# Patient Record
Sex: FEMALE | Race: WHITE | NOT HISPANIC OR LATINO | Employment: PART TIME | URBAN - METROPOLITAN AREA
[De-identification: names, ages, dates, MRNs, and addresses within clinical notes are randomized per-mention and may not be internally consistent; named-entity substitution may affect disease eponyms.]

---

## 2016-02-05 LAB — PAP-ABSTRACT: NORMAL

## 2017-01-19 ENCOUNTER — OFFICE VISIT (OUTPATIENT)
Dept: FAMILY MEDICINE | Facility: CLINIC | Age: 37
End: 2017-01-19
Payer: COMMERCIAL

## 2017-01-19 ENCOUNTER — EXTERNAL ORDER RESULTS (OUTPATIENT)
Dept: HEALTH INFORMATION MANAGEMENT | Facility: CLINIC | Age: 37
End: 2017-01-19

## 2017-01-19 VITALS
HEART RATE: 113 BPM | RESPIRATION RATE: 20 BRPM | SYSTOLIC BLOOD PRESSURE: 112 MMHG | TEMPERATURE: 98 F | BODY MASS INDEX: 26.67 KG/M2 | WEIGHT: 176 LBS | DIASTOLIC BLOOD PRESSURE: 79 MMHG | HEIGHT: 68 IN

## 2017-01-19 DIAGNOSIS — R09.82 PND (POST-NASAL DRIP): ICD-10-CM

## 2017-01-19 DIAGNOSIS — B34.9 VIRAL SYNDROME: Primary | ICD-10-CM

## 2017-01-19 PROCEDURE — 99213 OFFICE O/P EST LOW 20 MIN: CPT | Performed by: FAMILY MEDICINE

## 2017-01-19 RX ORDER — CLONAZEPAM 1 MG/1
0.5 TABLET ORAL
COMMUNITY
Start: 2017-01-11 | End: 2022-04-25

## 2017-01-19 RX ORDER — HYDROCODONE BITARTRATE AND ACETAMINOPHEN 5; 325 MG/1; MG/1
1 TABLET ORAL
COMMUNITY
Start: 2017-01-11 | End: 2021-04-30

## 2017-01-19 RX ORDER — FLUTICASONE PROPIONATE 50 MCG
2 SPRAY, SUSPENSION (ML) NASAL DAILY
Qty: 1 BOTTLE | Refills: 0 | Status: SHIPPED | OUTPATIENT
Start: 2017-01-19 | End: 2017-06-02

## 2017-01-19 NOTE — MR AVS SNAPSHOT
"              After Visit Summary   1/19/2017    Yvonne Diez    MRN: [id number]           Patient Information     Date Of Birth          1980        Visit Information        Provider Department      1/19/2017 1:45 PM Talia Silverio MD [facility]        Today's Diagnoses     Viral syndrome    -  1     PND (post-nasal drip)           Care Instructions      Follow up if symptoms persist or worsen   Viral Syndrome (Adult)  A viral illness may cause a number of symptoms. The symptoms depend on the part of the body that the virus affects. If it settles in the nose, throat, and lungs, it may cause cough, sore throat, congestion, and sometimes headache. If it settles in the stomach and intestinal tract, it may cause vomiting and diarrhea. Sometimes it causes vague symptoms like \"aching all over,\" feeling tired, loss of appetite, or fever.  A viral illness usually lasts 1 to 2 weeks, but sometimes it lasts longer. In some cases, a more serious infection can look like a viral syndrome in the first few days of the illness. You may need another exam and additional tests to know the difference. Watch for the warning signs listed below.  Home care  Follow these guidelines for taking care of yourself at home:    If symptoms are severe, rest at home for the first 2 to 3 days.    Stay away from cigarette smoke - both your smoke and the smoke from others.    You may use over-the-counter medication acetaminophen or ibuprofen for fever, muscle aching, and headache, unless another medicine was prescribed for this. If you have chronic liver or kidney disease or ever had a stomach ulcer or GI bleeding, talk with your doctor before using these medicines . No one who is younger than 18 and ill with a fever should take aspirin. It may cause severe disease or death liver damage .    Your appetite may be poor, so a light diet is fine. Avoid dehydration by drinking 8 to 12 8-ounce glasses of fluids " each day. This may include water; orange juice; lemonade; apple, grape, and cranberry juice; clear fruit drinks; electrolyte replacement and sports drinks; and decaffeinated teas and coffee. If you have been diagnosed with a kidney disease, ask your doctor how much and what types of fluids you should drink to prevent dehydration. If you have kidney disease, drinking too much fluid can cause it build up in the your body and be dangerous to your health.    Over-the-counter remedies won't shorten the length of the illness but may be helpful for cough, sore throat; and nasal and sinus congestion. Don't use decongestants if you have high blood pressure.  Follow-up care  Follow up with your health care provider if you do not improve over the next week.  When to seek medical advice  Call your healthcare provider right away if any of these occur:    Cough with lots of colored sputum (mucus) or blood in your sputum    Chest pain, shortness of breath, wheezing, or difficulty breathing    Severe headache; face, neck, or ear pain    Severe, constant pain in the lower right side of your belly (abdominal)    Continued vomiting (can t keep liquids down)    Frequent diarrhea (more than 5 times a day); blood (red or black color) or mucus in diarrhea    Feeling weak, dizzy, or like you are going to faint    Extreme thirst    Fever of 100.4 F (38 C) or higher, or as directed by your healthcare provider  Call 911  Get emergency medical care if any of the following occur:    Convulsion    Feeling weak, dizzy, or like you are going to faint    Chest pain, shortness of breath, wheezing, or difficulty breathing    7364-4695 [employer]. [address] [place] [address], [place] [address]. All rights reserved. This information is not intended as a substitute for professional medical care. Always follow your healthcare professional's instructions.              Follow-ups after your visit        Who to contact     If you have questions or  "need follow up information about today's clinic visit or your schedule please contact [facility] directly at [phone].  Normal or non-critical lab and imaging results will be communicated to you by [facility], letter or phone within 4 business days after the clinic has received the results. If you do not hear from us within 7 days, please contact the clinic through [employer]t or phone. If you have a critical or abnormal lab result, we will notify you by phone as soon as possible.  Submit refill requests through [employer] or call your pharmacy and they will forward the refill request to us. Please allow 3 business days for your refill to be completed.          Additional Information About Your Visit        [facility] Information     [employer] gives you secure access to your electronic health record. If you see a primary care provider, you can also send messages to your care team and make appointments. If you have questions, please call your primary care clinic.  If you do not have a primary care provider, please call [phone] and they will assist you.        Care EveryWhere ID     This is your Care EveryWhere ID. This could be used by other organizations to access your [place] medical records  [id number]        Your Vitals Were     Pulse Temperature Respirations Height BMI (Body Mass Index) Breastfeeding?    113 98  F (36.7  C) (Oral) 20 5' 8\" (1.727 m) 26.77 kg/m2 No       Blood Pressure from Last 3 Encounters:   01/19/17 112/79   07/04/16 110/70   04/05/16 108/60    Weight from Last 3 Encounters:   01/19/17 176 lb (79.833 kg)   07/04/16 174 lb (78.926 kg)   04/05/16 187 lb (84.823 kg)              Today, you had the following     No orders found for display         Today's Medication Changes          These changes are accurate as of: 1/19/17  2:21 PM.  If you have any questions, ask your nurse or doctor.               Start taking these medicines.        Dose/Directions    fluticasone 50 MCG/ACT " spray   Commonly known as:  FLONASE   Used for:  PND (post-nasal drip)   Started by:  Talia Silverio MD        Dose:  2 spray   Spray 2 sprays into both nostrils daily   Quantity:  1 Bottle   Refills:  0            Where to get your medicines      These medications were sent to [id number] - [place], [place] - [address] [facility]  [address], [place] [address]     Phone:  [phone]    - fluticasone 50 MCG/ACT spray             Primary Care Provider Office Phone # Fax #    Herbert Silva [phone]       [facility] [address] [facility] [address]  [place] [facility] [address]        Thank you!     Thank you for choosing [facility]  for your care. Our goal is always to provide you with excellent care. Hearing back from our patients is one way we can continue to improve our services. Please take a few minutes to complete the written survey that you may receive in the mail after your visit with us. Thank you!             Your Updated Medication List - Protect others around you: Learn how to safely use, store and throw away your medicines at www.disposemymeds.org.          This list is accurate as of: 1/19/17  2:21 PM.  Always use your most recent med list.                   Brand Name Dispense Instructions for use    ADVIL 200 MG capsule   Generic drug:  ibuprofen      Take 200 mg by mouth every 4 hours as needed. 2 tablets as needed for pain       BUSPAR 15 MG tablet   Generic drug:  busPIRone      Take 15 mg by mouth daily.       CALCIUM 1000 + D PO      Take  by mouth daily.       celeXA 40 MG tablet   Generic drug:  citalopram      Take 40 mg by mouth daily       clonazePAM 1 MG tablet    klonoPIN     prn       fluticasone 50 MCG/ACT spray    FLONASE    1 Bottle    Spray 2 sprays into both nostrils daily       HYDROcodone-acetaminophen 5-325 MG per tablet    NORCO     Take 1 tablet by mouth 1 tab DAILY prn       MULTI  MAX PO      Take  by mouth daily.       norethindrone-ethinyl estradiol-iron 1.5-30 MG-MCG per tablet    MICROGESTIN FE1.5/30    3 Package    Take 1 tablet by mouth daily.       WAL-ZACKERY MAXIMUM STRENGTH 50 MG Caps   Generic drug:  DiphenhydrAMINE HCl (Sleep)      Take 1 capsule by mouth At Bedtime.

## 2017-01-19 NOTE — NURSING NOTE
"Chief Complaint   Patient presents with     URI     uri symptoms x1 week, c/o bilateral ear pain, fever and chills     Initial /79 mmHg  Pulse 113  Temp(Src) 98  F (36.7  C) (Oral)  Resp 20  Ht 5' 8\" (1.727 m)  Wt 176 lb (79.833 kg)  BMI 26.77 kg/m2  Breastfeeding? No Estimated body mass index is 26.77 kg/(m^2) as calculated from the following:    Height as of this encounter: 5' 8\" (1.727 m).    Weight as of this encounter: 176 lb (79.833 kg)..  BP completed using cuff size regular.            Fina Hickman/ASTRID    "

## 2017-01-19 NOTE — Clinical Note
Please abstract the following data from this visit with this patient into the appropriate field in Epic:  Pap smear done on this date: 01/01/2016 (approximately), by this group: [facility], results were wnl.

## 2017-01-19 NOTE — PROGRESS NOTES
"  SUBJECTIVE:                                                    Yvonne Diez is a 36 year old female who presents to clinic today for the following health issues:      Acute Illness   Acute illness concerns: sinus pain/pressure  Onset: x1 week     Fever: YES- yesterday 100     Chills/Sweats: YES    Headache (location?): YES    Sinus Pressure:YES    Conjunctivitis:  no    Ear Pain: YES: bilateral    Rhinorrhea: YES    Congestion: YES    Sore Throat: YES    Teeth pain: no      Cough: no    Wheeze: no    Shortness of breath: no     Decreased Appetite: YES    Nausea: no    Vomiting: no    Diarrhea:  YES    Dysuria/Freq.: no    Fatigue/Achiness: YES    Sick/Strep Exposure: no     Therapies Tried and outcome: sudafed, nyquil/dayquil,     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   Denies rash. Her son is also sick with bronchitis and is currently on steroids.   Also reports insomnia- of note this is not a new problem but states it has worsened since she's been sick.     Problem list and histories reviewed & adjusted, as indicated.  Additional history: as documented    Problem list, Medication list, Allergies, and Medical/Social/Surgical histories reviewed in [facility] and updated as appropriate.    ROS:  Constitutional, HEENT, cardiovascular, pulmonary, msk, neuro systems are negative, except as otherwise noted.    OBJECTIVE:                                                    /79 mmHg  Pulse 113  Temp(Src) 98  F (36.7  C) (Oral)  Resp 20  Ht 5' 8\" (1.727 m)  Wt 176 lb (79.833 kg)  BMI 26.77 kg/m2  Breastfeeding? No  Body mass index is 26.77 kg/(m^2).  GENERAL: alert, ill-appearing, fatigued  HENT: ear canals and TM's normal, nose and mouth without ulcers or lesions, nasal congestion   NECK: no adenopathy, no asymmetry, masses, or scars and thyroid normal to palpation  RESP: lungs clear to auscultation - no rales, rhonchi or " wheezes  CV: regular rate and rhythm, normal S1 S2, no S3 or S4, no murmur, click or rub, no peripheral edema and peripheral pulses strong    Diagnostic Test Results:  none      ASSESSMENT/PLAN:                                                        1. Viral syndrome  - sxs consistent with viral syndrome. Considering her symptoms have been over a week there will be little benefit of tamiflu at this time.   Patient agrees with this. There is no benefit of testing for parvovirus at this as treatment is mainly symptomatic care.     2. PND (post-nasal drip)  - by history will try on flonase   - fluticasone (FLONASE) 50 MCG/ACT spray; Spray 2 sprays into both nostrils daily  Dispense: 1 Bottle; Refill: 0    See Patient Instructions    Talia Silverio MD  [facility]

## 2017-01-19 NOTE — PATIENT INSTRUCTIONS
"  Follow up if symptoms persist or worsen   Viral Syndrome (Adult)  A viral illness may cause a number of symptoms. The symptoms depend on the part of the body that the virus affects. If it settles in the nose, throat, and lungs, it may cause cough, sore throat, congestion, and sometimes headache. If it settles in the stomach and intestinal tract, it may cause vomiting and diarrhea. Sometimes it causes vague symptoms like \"aching all over,\" feeling tired, loss of appetite, or fever.  A viral illness usually lasts 1 to 2 weeks, but sometimes it lasts longer. In some cases, a more serious infection can look like a viral syndrome in the first few days of the illness. You may need another exam and additional tests to know the difference. Watch for the warning signs listed below.  Home care  Follow these guidelines for taking care of yourself at home:    If symptoms are severe, rest at home for the first 2 to 3 days.    Stay away from cigarette smoke - both your smoke and the smoke from others.    You may use over-the-counter medication acetaminophen or ibuprofen for fever, muscle aching, and headache, unless another medicine was prescribed for this. If you have chronic liver or kidney disease or ever had a stomach ulcer or GI bleeding, talk with your doctor before using these medicines . No one who is younger than 18 and ill with a fever should take aspirin. It may cause severe disease or death liver damage .    Your appetite may be poor, so a light diet is fine. Avoid dehydration by drinking 8 to 12 8-ounce glasses of fluids each day. This may include water; orange juice; lemonade; apple, grape, and cranberry juice; clear fruit drinks; electrolyte replacement and sports drinks; and decaffeinated teas and coffee. If you have been diagnosed with a kidney disease, ask your doctor how much and what types of fluids you should drink to prevent dehydration. If you have kidney disease, drinking too much fluid can cause it " build up in the your body and be dangerous to your health.    Over-the-counter remedies won't shorten the length of the illness but may be helpful for cough, sore throat; and nasal and sinus congestion. Don't use decongestants if you have high blood pressure.  Follow-up care  Follow up with your health care provider if you do not improve over the next week.  When to seek medical advice  Call your healthcare provider right away if any of these occur:    Cough with lots of colored sputum (mucus) or blood in your sputum    Chest pain, shortness of breath, wheezing, or difficulty breathing    Severe headache; face, neck, or ear pain    Severe, constant pain in the lower right side of your belly (abdominal)    Continued vomiting (can t keep liquids down)    Frequent diarrhea (more than 5 times a day); blood (red or black color) or mucus in diarrhea    Feeling weak, dizzy, or like you are going to faint    Extreme thirst    Fever of 100.4 F (38 C) or higher, or as directed by your healthcare provider  Call 911  Get emergency medical care if any of the following occur:    Convulsion    Feeling weak, dizzy, or like you are going to faint    Chest pain, shortness of breath, wheezing, or difficulty breathing    6963-5565 [employer]. [address] [place] [address], [place] [address]. All rights reserved. This information is not intended as a substitute for professional medical care. Always follow your healthcare professional's instructions.

## 2017-03-03 ENCOUNTER — TELEPHONE (OUTPATIENT)
Dept: FAMILY MEDICINE | Facility: CLINIC | Age: 37
End: 2017-03-03

## 2017-03-03 NOTE — TELEPHONE ENCOUNTER
Panel Management Review      Patient has the following on her problem list:   Depression / Dysthymia review  No flowsheet data found.   Patient is due for:  None      Composite cancer screening  Chart review shows that this patient is due/due soon for the following Pap Smear  Summary:    Patient is due/failing the following:   PAP    Action needed:   Patient needs office visit for PE/PAP.    Type of outreach:    none, need to update chart, had normal pap 02/05/2016 at [facility],    Questions for provider review:    None                                                                                                                                    Fina Hickman/ASTRID  [place]---[facility]       Chart routed to none .

## 2017-04-22 ENCOUNTER — APPOINTMENT (OUTPATIENT)
Dept: CT IMAGING | Facility: CLINIC | Age: 37
End: 2017-04-22
Attending: INTERNAL MEDICINE
Payer: COMMERCIAL

## 2017-04-22 ENCOUNTER — APPOINTMENT (OUTPATIENT)
Dept: GENERAL RADIOLOGY | Facility: CLINIC | Age: 37
End: 2017-04-22
Attending: INTERNAL MEDICINE
Payer: COMMERCIAL

## 2017-04-22 ENCOUNTER — HOSPITAL ENCOUNTER (EMERGENCY)
Facility: CLINIC | Age: 37
Discharge: HOME | End: 2017-04-22
Attending: INTERNAL MEDICINE | Admitting: INTERNAL MEDICINE
Payer: COMMERCIAL

## 2017-04-22 VITALS
DIASTOLIC BLOOD PRESSURE: 73 MMHG | SYSTOLIC BLOOD PRESSURE: 102 MMHG | RESPIRATION RATE: 18 BRPM | TEMPERATURE: 98.4 F | OXYGEN SATURATION: 100 % | HEART RATE: 75 BPM

## 2017-04-22 DIAGNOSIS — R51.9 ACUTE NONINTRACTABLE HEADACHE, UNSPECIFIED HEADACHE TYPE: ICD-10-CM

## 2017-04-22 LAB
ALBUMIN SERPL-MCNC: 3.4 G/DL (ref 3.4–5)
ALBUMIN UR-MCNC: NEGATIVE MG/DL
ALP SERPL-CCNC: 46 U/L (ref 40–150)
ALT SERPL W P-5'-P-CCNC: 34 U/L (ref 0–50)
ANION GAP SERPL CALCULATED.3IONS-SCNC: 7 MMOL/L (ref 3–14)
APPEARANCE CSF: CLEAR
APPEARANCE UR: CLEAR
AST SERPL W P-5'-P-CCNC: 22 U/L (ref 0–45)
BASOPHILS # BLD AUTO: 0 10E9/L (ref 0–0.2)
BASOPHILS NFR BLD AUTO: 0.1 %
BILIRUB SERPL-MCNC: 0.3 MG/DL (ref 0.2–1.3)
BILIRUB UR QL STRIP: NEGATIVE
BUN SERPL-MCNC: 9 MG/DL (ref 7–30)
CALCIUM SERPL-MCNC: 8 MG/DL (ref 8.5–10.1)
CHLORIDE SERPL-SCNC: 105 MMOL/L (ref 94–109)
CO2 SERPL-SCNC: 25 MMOL/L (ref 20–32)
COLOR CSF: COLORLESS
COLOR UR AUTO: YELLOW
CREAT SERPL-MCNC: 0.69 MG/DL (ref 0.52–1.04)
DIFFERENTIAL METHOD BLD: ABNORMAL
EOSINOPHIL # BLD AUTO: 0 10E9/L (ref 0–0.7)
EOSINOPHIL NFR BLD AUTO: 0.1 %
ERYTHROCYTE [DISTWIDTH] IN BLOOD BY AUTOMATED COUNT: 12.2 % (ref 10–15)
FLUAV+FLUBV AG SPEC QL: NEGATIVE
FLUAV+FLUBV AG SPEC QL: NORMAL
GFR SERPL CREATININE-BSD FRML MDRD: ABNORMAL ML/MIN/1.7M2
GLUCOSE CSF-MCNC: 68 MG/DL (ref 40–70)
GLUCOSE SERPL-MCNC: 97 MG/DL (ref 70–99)
GLUCOSE UR STRIP-MCNC: NEGATIVE MG/DL
GRAM STN SPEC: NORMAL
HCT VFR BLD AUTO: 36.6 % (ref 35–47)
HGB BLD-MCNC: 12.6 G/DL (ref 11.7–15.7)
HGB UR QL STRIP: NEGATIVE
IMM GRANULOCYTES # BLD: 0 10E9/L (ref 0–0.4)
IMM GRANULOCYTES NFR BLD: 0.1 %
KETONES UR STRIP-MCNC: NEGATIVE MG/DL
LEUKOCYTE ESTERASE UR QL STRIP: NEGATIVE
LYMPHOCYTES # BLD AUTO: 0.7 10E9/L (ref 0.8–5.3)
LYMPHOCYTES NFR BLD AUTO: 9.3 %
MCH RBC QN AUTO: 32.2 PG (ref 26.5–33)
MCHC RBC AUTO-ENTMCNC: 34.4 G/DL (ref 31.5–36.5)
MCV RBC AUTO: 94 FL (ref 78–100)
MICRO REPORT STATUS: NORMAL
MONOCYTES # BLD AUTO: 0.5 10E9/L (ref 0–1.3)
MONOCYTES NFR BLD AUTO: 6.4 %
NEUTROPHILS # BLD AUTO: 5.9 10E9/L (ref 1.6–8.3)
NEUTROPHILS NFR BLD AUTO: 84 %
NITRATE UR QL: NEGATIVE
NRBC # BLD AUTO: 0 10*3/UL
NRBC BLD AUTO-RTO: 0 /100
PH UR STRIP: 6 PH (ref 5–7)
PLATELET # BLD AUTO: 298 10E9/L (ref 150–450)
POTASSIUM SERPL-SCNC: 3.2 MMOL/L (ref 3.4–5.3)
PROT CSF-MCNC: 28 MG/DL (ref 15–60)
PROT SERPL-MCNC: 7.1 G/DL (ref 6.8–8.8)
RBC # BLD AUTO: 3.91 10E12/L (ref 3.8–5.2)
RBC # CSF MANUAL: 0 /UL (ref 0–2)
RBC # CSF MANUAL: NORMAL /UL (ref 0–2)
RBC #/AREA URNS AUTO: <1 /HPF (ref 0–2)
SODIUM SERPL-SCNC: 137 MMOL/L (ref 133–144)
SP GR UR STRIP: 1.02 (ref 1–1.03)
SPECIMEN SOURCE: NORMAL
SPECIMEN SOURCE: NORMAL
SQUAMOUS #/AREA URNS AUTO: <1 /HPF (ref 0–1)
TUBE # CSF: 4 #
URN SPEC COLLECT METH UR: NORMAL
UROBILINOGEN UR STRIP-MCNC: 0 MG/DL (ref 0–2)
WBC # BLD AUTO: 7 10E9/L (ref 4–11)
WBC # CSF MANUAL: 2 /UL (ref 0–5)
WBC # CSF MANUAL: NORMAL /UL (ref 0–5)
WBC #/AREA URNS AUTO: 1 /HPF (ref 0–2)

## 2017-04-22 PROCEDURE — 70450 CT HEAD/BRAIN W/O DYE: CPT

## 2017-04-22 PROCEDURE — 89050 BODY FLUID CELL COUNT: CPT | Performed by: INTERNAL MEDICINE

## 2017-04-22 PROCEDURE — 96374 THER/PROPH/DIAG INJ IV PUSH: CPT

## 2017-04-22 PROCEDURE — 82945 GLUCOSE OTHER FLUID: CPT | Performed by: INTERNAL MEDICINE

## 2017-04-22 PROCEDURE — 71020 XR CHEST 2 VW: CPT

## 2017-04-22 PROCEDURE — 96375 TX/PRO/DX INJ NEW DRUG ADDON: CPT | Mod: 59

## 2017-04-22 PROCEDURE — 87070 CULTURE OTHR SPECIMN AEROBIC: CPT | Performed by: INTERNAL MEDICINE

## 2017-04-22 PROCEDURE — 99285 EMERGENCY DEPT VISIT HI MDM: CPT | Mod: 25

## 2017-04-22 PROCEDURE — 62270 DX LMBR SPI PNXR: CPT

## 2017-04-22 PROCEDURE — 25500064 ZZH RX 255 OP 636: Performed by: INTERNAL MEDICINE

## 2017-04-22 PROCEDURE — 25000128 H RX IP 250 OP 636: Performed by: INTERNAL MEDICINE

## 2017-04-22 PROCEDURE — 96376 TX/PRO/DX INJ SAME DRUG ADON: CPT | Mod: 59

## 2017-04-22 PROCEDURE — 81001 URINALYSIS AUTO W/SCOPE: CPT | Performed by: INTERNAL MEDICINE

## 2017-04-22 PROCEDURE — 87205 SMEAR GRAM STAIN: CPT | Performed by: INTERNAL MEDICINE

## 2017-04-22 PROCEDURE — 85025 COMPLETE CBC W/AUTO DIFF WBC: CPT | Performed by: INTERNAL MEDICINE

## 2017-04-22 PROCEDURE — 70498 CT ANGIOGRAPHY NECK: CPT

## 2017-04-22 PROCEDURE — 96361 HYDRATE IV INFUSION ADD-ON: CPT

## 2017-04-22 PROCEDURE — 87040 BLOOD CULTURE FOR BACTERIA: CPT | Performed by: INTERNAL MEDICINE

## 2017-04-22 PROCEDURE — 87804 INFLUENZA ASSAY W/OPTIC: CPT | Mod: 91 | Performed by: INTERNAL MEDICINE

## 2017-04-22 PROCEDURE — 80053 COMPREHEN METABOLIC PANEL: CPT | Performed by: INTERNAL MEDICINE

## 2017-04-22 PROCEDURE — 84157 ASSAY OF PROTEIN OTHER: CPT | Performed by: INTERNAL MEDICINE

## 2017-04-22 RX ORDER — METOCLOPRAMIDE 10 MG/1
10 TABLET ORAL 4 TIMES DAILY PRN
Qty: 20 TABLET | Refills: 0 | Status: SHIPPED | OUTPATIENT
Start: 2017-04-22 | End: 2017-06-02

## 2017-04-22 RX ORDER — IOPAMIDOL 755 MG/ML
500 INJECTION, SOLUTION INTRAVASCULAR ONCE
Status: COMPLETED | OUTPATIENT
Start: 2017-04-22 | End: 2017-04-22

## 2017-04-22 RX ORDER — METOCLOPRAMIDE HYDROCHLORIDE 5 MG/ML
10 INJECTION INTRAMUSCULAR; INTRAVENOUS ONCE
Status: COMPLETED | OUTPATIENT
Start: 2017-04-22 | End: 2017-04-22

## 2017-04-22 RX ORDER — ONDANSETRON 4 MG/1
4 TABLET, ORALLY DISINTEGRATING ORAL EVERY 8 HOURS PRN
Qty: 10 TABLET | Refills: 0 | Status: SHIPPED | OUTPATIENT
Start: 2017-04-22 | End: 2017-04-25

## 2017-04-22 RX ORDER — HYDROMORPHONE HYDROCHLORIDE 1 MG/ML
0.5 INJECTION, SOLUTION INTRAMUSCULAR; INTRAVENOUS; SUBCUTANEOUS
Status: DISCONTINUED | OUTPATIENT
Start: 2017-04-22 | End: 2017-04-22 | Stop reason: HOSPADM

## 2017-04-22 RX ORDER — OXYCODONE AND ACETAMINOPHEN 5; 325 MG/1; MG/1
1-2 TABLET ORAL EVERY 4 HOURS PRN
Qty: 15 TABLET | Refills: 0 | Status: SHIPPED | OUTPATIENT
Start: 2017-04-22 | End: 2017-04-24

## 2017-04-22 RX ORDER — MORPHINE SULFATE 4 MG/ML
4 INJECTION, SOLUTION INTRAMUSCULAR; INTRAVENOUS ONCE
Status: COMPLETED | OUTPATIENT
Start: 2017-04-22 | End: 2017-04-22

## 2017-04-22 RX ORDER — DIPHENHYDRAMINE HYDROCHLORIDE 50 MG/ML
25 INJECTION INTRAMUSCULAR; INTRAVENOUS ONCE
Status: COMPLETED | OUTPATIENT
Start: 2017-04-22 | End: 2017-04-22

## 2017-04-22 RX ORDER — SODIUM CHLORIDE 9 MG/ML
1000 INJECTION, SOLUTION INTRAVENOUS CONTINUOUS
Status: DISCONTINUED | OUTPATIENT
Start: 2017-04-22 | End: 2017-04-22 | Stop reason: HOSPADM

## 2017-04-22 RX ORDER — ONDANSETRON 2 MG/ML
4 INJECTION INTRAMUSCULAR; INTRAVENOUS EVERY 30 MIN PRN
Status: DISCONTINUED | OUTPATIENT
Start: 2017-04-22 | End: 2017-04-22 | Stop reason: HOSPADM

## 2017-04-22 RX ADMIN — HYDROMORPHONE HYDROCHLORIDE 0.5 MG: 1 INJECTION, SOLUTION INTRAMUSCULAR; INTRAVENOUS; SUBCUTANEOUS at 17:15

## 2017-04-22 RX ADMIN — DIPHENHYDRAMINE HYDROCHLORIDE 25 MG: 50 INJECTION, SOLUTION INTRAMUSCULAR; INTRAVENOUS at 19:08

## 2017-04-22 RX ADMIN — IOPAMIDOL 70 ML: 755 INJECTION, SOLUTION INTRAVENOUS at 17:50

## 2017-04-22 RX ADMIN — MORPHINE SULFATE 4 MG: 4 INJECTION, SOLUTION INTRAMUSCULAR; INTRAVENOUS at 20:00

## 2017-04-22 RX ADMIN — METOCLOPRAMIDE 10 MG: 5 INJECTION, SOLUTION INTRAMUSCULAR; INTRAVENOUS at 19:09

## 2017-04-22 RX ADMIN — SODIUM CHLORIDE 1000 ML: 9 INJECTION, SOLUTION INTRAVENOUS at 18:20

## 2017-04-22 RX ADMIN — ONDANSETRON 4 MG: 2 INJECTION INTRAMUSCULAR; INTRAVENOUS at 17:15

## 2017-04-22 RX ADMIN — SODIUM CHLORIDE 80 ML: 9 INJECTION, SOLUTION INTRAVENOUS at 17:50

## 2017-04-22 RX ADMIN — HYDROMORPHONE HYDROCHLORIDE 0.5 MG: 1 INJECTION, SOLUTION INTRAMUSCULAR; INTRAVENOUS; SUBCUTANEOUS at 18:18

## 2017-04-22 RX ADMIN — MORPHINE SULFATE 4 MG: 4 INJECTION, SOLUTION INTRAMUSCULAR; INTRAVENOUS at 19:09

## 2017-04-22 RX ADMIN — ONDANSETRON 4 MG: 2 INJECTION INTRAMUSCULAR; INTRAVENOUS at 18:20

## 2017-04-22 RX ADMIN — SODIUM CHLORIDE 1000 ML: 9 INJECTION, SOLUTION INTRAVENOUS at 17:15

## 2017-04-22 ASSESSMENT — ENCOUNTER SYMPTOMS
DIARRHEA: 1
FEVER: 1
COUGH: 0
SHORTNESS OF BREATH: 0
HEADACHES: 1
BACK PAIN: 1
NECK PAIN: 1
PALPITATIONS: 1
VOMITING: 1

## 2017-04-22 NOTE — ED AVS SNAPSHOT
[facility] Emergency Department    [address] [place] [address]    Phone:  [phone]    Fax:  [phone]                                       Yvonne Diez   MRN: [id number]    Department:  [facility] Emergency Department   Date of Visit:  4/22/2017           Patient Information     Date Of Birth          1980        Your diagnoses for this visit were:     Acute nonintractable headache, unspecified headache type        You were seen by Bonita Moore MD.      Follow-up Information     Follow up with Herbert Silva MD In 2 days.    Specialty:  Family Practice    Contact information:    [facility]  [address]  [place] [address]  [phone]          Discharge Instructions       Discharge Instructions  Headache    You were seen today for a headache. Headaches may be caused by many different things such as muscle tension, sinus inflammation, anxiety and stress, having too little sleep, too much alcohol, some medical conditions or injury. You may have a migraine, which is caused by changes in the blood vessels in your head.  At this time your doctor does not find that your headache is a sign of anything dangerous or life-threatening.  However, sometimes the signs of serious illness do not show up right away.  If you have new or worse symptoms, you may need to be seen again in the emergency department or by your primary doctor.      Return to the Emergency Department if:    You get a fever of 101 F or higher.    Your headache gets much worse.    You get a stiff neck with your headache.    You get a new headache that is different or worse than headaches you have had before.    You are vomiting and can t keep food or water down.    You have blurry or double vision or other problems with your eyes.    You have a new weakness on one side of your body.    You have difficulty with balance which is new.    You or your family thinks you are  confused.    You have a seizure or convulsion.    What can I do to help myself?    Pain medications - You may take a pain medication such as Tylenol  (acetaminophen), Advil , Nuprin  (ibuprofen) or Aleve  (naproxen).  If you have been given a narcotic such as Vicodin  (hydrocodone with acetaminophen), Percocet  (oxycodone with acetaminophen), codeine, or a muscle relaxant such as Flexeril  (cyclobenzaprine) or Soma  (carisoprodol), do not drive for four hours after you have taken it. If the narcotic contains Tylenol  (acetaminophen), do not take Tylenol  with it. All narcotics will cause constipation, so eat a high fiber diet.        Take a pain reliever as soon as you notice symptoms.  Starting medications as soon as you start to have symptoms may lessen the amount of pain you have.    Relaxing in a quiet, dark room may help.    Get enough sleep and eat meals regularly.    Schedule an appointment with your primary physician as instructed, or at least within 1 week.    You may need to watch for certain foods or other things which may trigger your headaches.  Keeping a journal of your headaches and possible triggers may help you and your primary doctor to identify things which you should avoid which may be causing your headaches.  If you were given a prescription for medicine here today, be sure to read all of the information (including the package insert) that comes with your prescription.  This will include important information about the medicine, its side effects, and any warnings that you need to know about.  The pharmacist who fills the prescription can provide more information and answer questions you may have about the medicine.  If you have questions or concerns that the pharmacist cannot address, please call or return to the Emergency Department.   Opioid Medication Information    Pain medications are among the most commonly prescribed medicines, so we are including this information for all our patients. If  you did not receive pain medication or get a prescription for pain medicine, you can ignore it.     You may have been given a prescription for an opioid (narcotic) pain medicine and/or have received a pain medicine while here in the Emergency Department. These medicines can make you drowsy or impaired. You must not drive, operate dangerous equipment, or engage in any other dangerous activities while taking these medications. If you drive while taking these medications, you could be arrested for DUI, or driving under the influence. Do not drink any alcohol while you are taking these medications.     Opioid pain medications can cause addiction. If you have a history of chemical dependency of any type, you are at a higher risk of becoming addicted to pain medications.  Only take these prescribed medications to treat your pain when all other options have been tried. Take it for as short a time and as few doses as possible. Store your pain pills in a secure place, as they are frequently stolen and provide a dangerous opportunity for children or visitors in your house to start abusing these powerful medications. We will not replace any lost or stolen medicine.  As soon as your pain is better, you should flush all your remaining medication.     Many prescription pain medications contain Tylenol  (acetaminophen), including Vicodin , Tylenol #3 , Norco , Lortab , and Percocet .  You should not take any extra pills of Tylenol  if you are using these prescription medications or you can get very sick.  Do not ever take more than 3000 mg of acetaminophen in any 24 hour period.    All opioids tend to cause constipation. Drink plenty of water and eat foods that have a lot of fiber, such as fruits, vegetables, prune juice, apple juice and high fiber cereal.  Take a laxative if you don t move your bowels at least every other day. Miralax , Milk of Magnesia, Colace , or Senna  can be used to keep you regular.      Remember that you  can always come back to the Emergency Department if you are not able to see your regular doctor in the amount of time listed above, if you get any new symptoms, or if there is anything that worries you.        24 Hour Appointment Hotline       To make an appointment at any [facility] clinic, call [phone] ([phone]). If you don't have a family doctor or clinic, we will help you find one. [facility] clinics are conveniently located to serve the needs of you and your family.             Review of your medicines      START taking        Dose / Directions Last dose taken    metoclopramide 10 MG tablet   Commonly known as:  REGLAN   Dose:  10 mg   Quantity:  20 tablet        Take 1 tablet (10 mg) by mouth 4 times daily as needed   Refills:  0        ondansetron 4 MG ODT tab   Commonly known as:  ZOFRAN ODT   Dose:  4 mg   Quantity:  10 tablet        Take 1 tablet (4 mg) by mouth every 8 hours as needed   Refills:  0        oxyCODONE-acetaminophen 5-325 MG per tablet   Commonly known as:  PERCOCET   Dose:  1-2 tablet   Quantity:  15 tablet        Take 1-2 tablets by mouth every 4 hours as needed for pain   Refills:  0          Our records show that you are taking the medicines listed below. If these are incorrect, please call your family doctor or clinic.        Dose / Directions Last dose taken    ADVIL 200 MG capsule   Dose:  200 mg   Generic drug:  ibuprofen        Take 200 mg by mouth every 4 hours as needed. 2 tablets as needed for pain   Refills:  0        BUSPAR 15 MG tablet   Dose:  15 mg   Generic drug:  busPIRone        Take 15 mg by mouth daily.   Refills:  0        CALCIUM 1000 + D PO        Take  by mouth daily.   Refills:  0        celeXA 40 MG tablet   Dose:  40 mg   Generic drug:  citalopram        Take 40 mg by mouth daily   Refills:  0        clonazePAM 1 MG tablet   Commonly known as:  klonoPIN        prn   Refills:  0        fluticasone 50 MCG/ACT spray   Commonly known as:  FLONASE   Dose:  2  spray   Quantity:  1 Bottle        Spray 2 sprays into both nostrils daily   Refills:  0        HYDROcodone-acetaminophen 5-325 MG per tablet   Commonly known as:  NORCO   Dose:  1 tablet        Take 1 tablet by mouth 1 tab DAILY prn   Refills:  0        MULTI MAX PO        Take  by mouth daily.   Refills:  0        norethindrone-ethinyl estradiol-iron 1.5-30 MG-MCG per tablet   Commonly known as:  MICROGESTIN FE1.5/30   Dose:  1 tablet   Quantity:  3 Package        Take 1 tablet by mouth daily.   Refills:  3        WAL-ZACKERY MAXIMUM STRENGTH 50 MG Caps   Dose:  1 capsule   Generic drug:  DiphenhydrAMINE HCl (Sleep)        Take 1 capsule by mouth At Bedtime.   Refills:  0                Prescriptions were sent or printed at these locations (3 Prescriptions)                   Other Prescriptions                Printed at Department/Unit printer (3 of 3)         metoclopramide (REGLAN) 10 MG tablet               ondansetron (ZOFRAN ODT) 4 MG ODT tab               oxyCODONE-acetaminophen (PERCOCET) 5-325 MG per tablet                Procedures and tests performed during your visit     Blood culture ONE site    CBC with platelets differential    CSF Culture Aerobic Bacterial    CT Head Neck Angio w/o & w Contrast    CT Head w/o Contrast    Cell count with differential CSF: Tube 1    Cell count with differential CSF: Tube 4    Chest XR,  PA & LAT    Comprehensive metabolic panel    Glucose CSF: Tube 2    Gram stain    Influenza A/B antigen    Protein total CSF: Tube 2    UA with Microscopic reflex to Culture      Orders Needing Specimen Collection     None      Pending Results     Date and Time Order Name Status Description    4/22/2017 1821 Gram stain Preliminary     4/22/2017 1821 CSF Culture Aerobic Bacterial In process     4/22/2017 1650 Blood culture ONE site In process             Pending Culture Results     Date and Time Order Name Status Description    4/22/2017 1821 Gram stain Preliminary     4/22/2017 1821 CSF  Culture Aerobic Bacterial In process     4/22/2017 1650 Blood culture ONE site In process             Test Results From Your Hospital Stay        4/22/2017  5:36 PM      Component Results     Component Value Ref Range & Units Status    WBC 7.0 4.0 - 11.0 10e9/L Final    RBC Count 3.91 3.8 - 5.2 10e12/L Final    Hemoglobin 12.6 11.7 - 15.7 g/dL Final    Hematocrit 36.6 35.0 - 47.0 % Final    MCV 94 78 - 100 fl Final    MCH 32.2 26.5 - 33.0 pg Final    MCHC 34.4 31.5 - 36.5 g/dL Final    RDW 12.2 10.0 - 15.0 % Final    Platelet Count 298 150 - 450 10e9/L Final    Diff Method Automated Method  Final    % Neutrophils 84.0 % Final    % Lymphocytes 9.3 % Final    % Monocytes 6.4 % Final    % Eosinophils 0.1 % Final    % Basophils 0.1 % Final    % Immature Granulocytes 0.1 % Final    Nucleated RBCs 0 0 /100 Final    Absolute Neutrophil 5.9 1.6 - 8.3 10e9/L Final    Absolute Lymphocytes 0.7 (L) 0.8 - 5.3 10e9/L Final    Absolute Monocytes 0.5 0.0 - 1.3 10e9/L Final    Absolute Eosinophils 0.0 0.0 - 0.7 10e9/L Final    Absolute Basophils 0.0 0.0 - 0.2 10e9/L Final    Abs Immature Granulocytes 0.0 0 - 0.4 10e9/L Final    Absolute Nucleated RBC 0.0  Final         4/22/2017  7:13 PM      Narrative     CT ANGIOGRAM OF THE HEAD AND NECK WITHOUT AND WITH CONTRAST  4/22/2017  6:00 PM     COMPARISON: None.    HISTORY: Headache    TECHNIQUE:  Precontrast localizing scans were followed by CT  angiography with an injection of 70mL Isovue-370 nonionic intravenous  contrast material with scans through the head and neck.  Images were  transferred to a separate 3-D workstation where multiplanar  reformations and 3-D images were created.  Estimates of carotid  stenoses are made relative to the distal internal carotid artery  diameters except as noted.      FINDINGS:   Neck CTA: The bilateral common carotid and bilateral cervical internal  carotid arteries are patent without stenosis. There is normal variant  duplicate origin of the right  vertebral artery. Vertebral arteries  bilaterally are widely patent without stenosis.    Head CTA: The bilateral distal internal carotid, basilar, bilateral  anterior cerebral, bilateral middle cerebral and bilateral posterior  cerebral arteries are patent and unremarkable. The anterior  communicating and bilateral posterior communicating arteries are  patent and unremarkable.        Impression     IMPRESSION: Normal neck and head CTA.      Radiation dose for this scan was reduced using automated exposure  control, adjustment of the mA and/or kV according to patient size, or  iterative reconstruction technique    MACK BEST MD         4/22/2017  7:12 PM      Narrative     CT OF THE HEAD WITHOUT CONTRAST 4/22/2017 5:58 PM     COMPARISON: None    HISTORY:  Headache    TECHNIQUE: Axial CT images of the head from the skull base to the  vertex were acquired without IV contrast.    FINDINGS: The ventricles and basal cisterns are within normal limits  in configuration. There is no midline shift. There are no extra-axial  fluid collections.  Gray-white differentiation is well maintained.    No intracranial hemorrhage, mass or recent infarct.    The visualized paranasal sinuses are well-aerated. There is no  mastoiditis. There are no fractures of the visualized bones.        Impression     IMPRESSION:  Normal head CT.      Radiation dose for this scan was reduced using automated exposure  control, adjustment of the mA and/or kV according to patient size, or  iterative reconstruction technique    MACK BEST MD         4/22/2017  5:52 PM      Component Results     Component Value Ref Range & Units Status    Sodium 137 133 - 144 mmol/L Final    Potassium 3.2 (L) 3.4 - 5.3 mmol/L Final    Chloride 105 94 - 109 mmol/L Final    Carbon Dioxide 25 20 - 32 mmol/L Final    Anion Gap 7 3 - 14 mmol/L Final    Glucose 97 70 - 99 mg/dL Final    Urea Nitrogen 9 7 - 30 mg/dL Final    Creatinine 0.69 0.52 - 1.04 mg/dL Final    GFR  Estimate >90  Non  GFR Calc   >60 mL/min/1.7m2 Final    GFR Estimate If Black >90   GFR Calc   >60 mL/min/1.7m2 Final    Calcium 8.0 (L) 8.5 - 10.1 mg/dL Final    Bilirubin Total 0.3 0.2 - 1.3 mg/dL Final    Albumin 3.4 3.4 - 5.0 g/dL Final    Protein Total 7.1 6.8 - 8.8 g/dL Final    Alkaline Phosphatase 46 40 - 150 U/L Final    ALT 34 0 - 50 U/L Final    AST 22 0 - 45 U/L Final         4/22/2017  5:55 PM      Component Results     Component Value Ref Range & Units Status    Influenza A/B Agn Specimen Nasopharyngeal  Final    Influenza A Negative NEG Final    Influenza B  NEG Final    Negative   Test results must be correlated with clinical data. If necessary, results   should be confirmed by a molecular assay or viral culture.           4/22/2017  6:32 PM      Component Results     Component Value Ref Range & Units Status    Color Urine Yellow  Final    Appearance Urine Clear  Final    Glucose Urine Negative NEG mg/dL Final    Bilirubin Urine Negative NEG Final    Ketones Urine Negative NEG mg/dL Final    Specific Gravity Urine 1.023 1.003 - 1.035 Final    Blood Urine Negative NEG Final    pH Urine 6.0 5.0 - 7.0 pH Final    Protein Albumin Urine Negative NEG mg/dL Final    Urobilinogen mg/dL 0.0 0.0 - 2.0 mg/dL Final    Nitrite Urine Negative NEG Final    Leukocyte Esterase Urine Negative NEG Final    Source Midstream Urine  Final    WBC Urine 1 0 - 2 /HPF Final    RBC Urine <1 0 - 2 /HPF Final    Squamous Epithelial /HPF Urine <1 0 - 1 /HPF Final         4/22/2017  6:08 PM      Narrative     CHEST TWO VIEWS 4/22/2017 6:04 PM     HISTORY: Fever.    COMPARISON: None.    FINDINGS: There are no acute infiltrates. The cardiac silhouette is  not enlarged. Pulmonary vasculature is unremarkable.        Impression     IMPRESSION: No acute disease.    NIC JAIME MD         4/22/2017  5:31 PM         4/22/2017  7:46 PM      Component Results     Component Value Ref Range & Units Status     WBC CSF 2 0 - 5 /uL Final    RBC CSF 0 0 - 2 /uL Final    Tube Number 4 # Final    Color CSF Colorless CLRL Final    Appearance CSF Clear CLER Final         4/22/2017  7:30 PM      Component Results     Component Value Ref Range & Units Status    Glucose CSF 68 40 - 70 mg/dL Final    CSF glucose concentrations are about 60 percent of normal plasma glucose.         4/22/2017  7:30 PM      Component Results     Component Value Ref Range & Units Status    Protein Total CSF 28 15 - 60 mg/dL Final         4/22/2017  7:05 PM         4/22/2017  8:01 PM      Component Results     Component    Specimen Description    Cerebrospinal fluid    Gram Stain    No organisms seen  Rare WBC'S seen  Gram stain result is preliminary and awaits review of Microbiology Staff.      Micro Report Status    Pending         4/22/2017  7:46 PM      Component Results     Component Value Ref Range & Units Status    WBC CSF  0 - 5 /uL Final    Test not performed. Criteria not met for second cell count.    RBC CSF  0 - 2 /uL Final    Test not performed. Criteria not met for second cell count.                Clinical Quality Measure: Blood Pressure Screening     Your blood pressure was checked while you were in the emergency department today. The last reading we obtained was  BP: 102/73 . Please read the guidelines below about what these numbers mean and what you should do about them.  If your systolic blood pressure (the top number) is less than 120 and your diastolic blood pressure (the bottom number) is less than 80, then your blood pressure is normal. There is nothing more that you need to do about it.  If your systolic blood pressure (the top number) is 120-139 or your diastolic blood pressure (the bottom number) is 80-89, your blood pressure may be higher than it should be. You should have your blood pressure rechecked within a year by a primary care provider.  If your systolic blood pressure (the top number) is 140 or greater or your diastolic  blood pressure (the bottom number) is 90 or greater, you may have high blood pressure. High blood pressure is treatable, but if left untreated over time it can put you at risk for heart attack, stroke, or kidney failure. You should have your blood pressure rechecked by a primary care provider within the next 4 weeks.  If your provider in the emergency department today gave you specific instructions to follow-up with your doctor or provider even sooner than that, you should follow that instruction and not wait for up to 4 weeks for your follow-up visit.        Thank you for choosing [facility]       Thank you for choosing [facility] for your care. Our goal is always to provide you with excellent care. Hearing back from our patients is one way we can continue to improve our services. Please take a few minutes to complete the written survey that you may receive in the mail after you visit with us. Thank you!        [employer] Information     [employer] gives you secure access to your electronic health record. If you see a primary care provider, you can also send messages to your care team and make appointments. If you have questions, please call your primary care clinic.  If you do not have a primary care provider, please call [phone] and they will assist you.        Care EveryWhere ID     This is your Care EveryWhere ID. This could be used by other organizations to access your [facility] medical records  [id number]        After Visit Summary       This is your record. Keep this with you and show to your community pharmacist(s) and doctor(s) at your next visit.

## 2017-04-22 NOTE — ED NOTES
Patient complains of headache and neck pain. Nausea and vomiting with diarrhea noted. Patient states that when she moves her head down pain shoots into her neck. Pain currently 9/10.

## 2017-04-22 NOTE — ED AVS SNAPSHOT
[facility] Emergency Department    [address]    [facility] [address]    Phone:  [phone]    Fax:  [phone]                                       Yvonne Diez   MRN: [id number]    Department:  [facility] Emergency Department   Date of Visit:  4/22/2017           After Visit Summary Signature Page     I have received my discharge instructions, and my questions have been answered. I have discussed any challenges I see with this plan with the nurse or doctor.    ..........................................................................................................................................  Patient/Patient Representative Signature      ..........................................................................................................................................  Patient Representative Print Name and Relationship to Patient    ..................................................               ................................................  Date                                            Time    ..........................................................................................................................................  Reviewed by Signature/Title    ...................................................              ..............................................  Date                                                            Time

## 2017-04-22 NOTE — ED PROVIDER NOTES
History     Chief Complaint:  Headache    HPI   Yvonne Diez is a 36 year old female with a history of endometriosis who presents to the emergency department today for evaluation of headache. Ms. Diez reports 21:00 last night she began to experience a sudden on set of a severe headache with intractable vomiting and diarrhea. The patient notes symptoms have been constant since with temporary improvement on Zofran.  She reports today at 04:00 she began to experience intermittent palpitations. The patient sates when she moves her head from side to side causes shooting pain radiating from her head down her spine. Of note, patient had similar episode years ago. Due to worsening of symptoms, fever of 101, and worry about possible meningitis, patient came in for evaluation. She denies cough, chest pain, or shortness of breath.     Fever of 101  No sick contacts.     Allergies:  Sulfa drugs    Medications:    Flonase  Klonopin  Celexa  Diphenhydramine  Buspar    Past Medical History:    Endometriosis  Insomnia  Depression  Anxiety    Past Surgical History:    Laparoscopy of abdomen  [place] teeth extraction    Family History:    Prostate cancer  Lung cancer  Lupus  Lout Geerigs disease    Social History:  Marital Status:   [2]  Tobacco: Former Smoker  Alcohol: Positive  Presents alone.   PCP: Herbert Silva  Works as an ICU nurse.     Review of Systems   Constitutional: Positive for fever.   Respiratory: Negative for cough and shortness of breath.    Cardiovascular: Positive for palpitations. Negative for chest pain.   Gastrointestinal: Positive for diarrhea and vomiting.   Musculoskeletal: Positive for back pain and neck pain.   Neurological: Positive for headaches.   All other systems reviewed and are negative.    Physical Exam   First Vitals:  BP: 128/81  Pulse: 111  Temp: 98.4  F (36.9  C)  Resp: 18  SpO2: 100 %    Physical Exam   Constitutional: She is cooperative. She appears distressed.   HENT:    Right Ear: Tympanic membrane normal.   Left Ear: Tympanic membrane normal.   Mouth/Throat: Oropharynx is clear and moist and mucous membranes are normal.   Eyes: Conjunctivae are normal.   Neck:   Some pain with rotation of neck, unable to flex due to pain   Cardiovascular: Regular rhythm and normal heart sounds.    Pulmonary/Chest: Effort normal and breath sounds normal.   Abdominal: Soft. Normal appearance and bowel sounds are normal. There is no rebound and no guarding.   Musculoskeletal: Normal range of motion.   Lymphadenopathy:     She has no cervical adenopathy.   Neurological: She is alert.   Skin: Skin is warm and dry.   Psychiatric: She has a normal mood and affect.       Emergency Department Course   Imaging:  Radiographic findings were communicated with the patient who voiced understanding of the findings.    Chest X-Ray. 2 Views:   No acute disease.   Final reading per radiology.     CT Head/Neck Angio without and with contrast  Normal neck and head CTA.   Preliminary  reading per radiology    CT Head with contrast:  Normal head CT.   Preliminary reading per radiology.     Laboratory:  Influenza A/B: Negative  CMP: Potassium 3.2, Calcium 8.0 o/w WNL. (Creatinine )  CBC:  WBC 7.0, HGB 12.6,   Blood culture pending.   Cell count with differential CSF: WBC 2 RBC 0 Tube Number 4,  coloreless , clear   Protein total CSF: 28  Glucose CSF: 68  Gram stain: Negative  CSF Culture: pending    UA: WNL. WBC/HPF 1, RBC/HPF <1.     Procedures:     Lumbar Puncture         INDICATION:  headache and fever      CONSENT:  Risks (including but not limited to; infection, bleeding, spinal headache with possibility of spinal patch and temporary or permanent neurologic injury), benefits and alternatives were discussed with patient and consent for procedure was obtained.    TIMEOUT:  Universal protocol was followed. TIME OUT conducted just prior to starting procedure confirmed patient identity, site/side, procedure,  patient position, and availability of correct equipment and implants? Yes      MEDICATIONS:  Conscious Sedation    PROCEDURAL NOTE:  Patient was placed in a left lateral decubitus position.  The low back was prepped with Betadine.  The patient was medicated as above.  A spinal needle was used to gain access to the subarachnoid space with stylet in place. The fluid was clear.  Stylet was replaced and needle withdrawn.    PATIENT STATUS:  Patient tolerated the procedure very well.  There were no complications.      Interventions:  17:15 Normal saline 1,000mL IV   18:18 Dilaudid 0.5 mg IV  18:20 Zofran 4mg IV     Emergency Department Course:  Nursing notes and vitals reviewed.    16:42  I performed an exam of the patient as documented above.     17:08 Blood drawn. This was sent to the lab for further testing, results above.    18:11 Urine collected. This was sent to the lab for further testing, results above.    17:17 Recheck patient.     The patient received the intervention(s) above.    18:36 I performed a lumbar and puncture as noted above.     18:52  CSF collected. This was sent to the lab for further testing, results above.    19:52 Recheck patient. Findings and plan explained to the Patient. Patient discharged home with instructions regarding supportive care, medications, and reasons to return. The importance of close follow-up was reviewed.  Impression & Plan    Medical Decision Making:  Yvonne Diez is a 36 year old female presents to the ED with sudden onset of an unusually severe headache along with fever in the night. 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 With a history of sudden onset and neck stiffness, I was also concerned about subarachnoid hemorrhage. Imaging was unremarkable, but given the concern, we did proceed to LP. This does not show any significant red blood cells, white blood cells, or xanthochromia. With this,  we have effectively ruled out life threatening etiologies of her headache. Other lab tests are unremarkable. Influenza test is negative, no leukocytosis. I suspect a nonspecific virus infection. I think we can treat symptomatically with close followup. I have discharged the patient with Reglan, Zofran, and Percocet as needed. Return if worsening or new symptoms. Follow 2 days in clinic.     Diagnosis:    ICD-10-CM    1. Acute nonintractable headache, unspecified headache type R51 Blood culture ONE site     CSF Culture Aerobic Bacterial     Gram stain       Disposition:  discharged to home    Discharge Medications:  Discharge Medication List as of 4/22/2017  9:02 PM      START taking these medications    Details   metoclopramide (REGLAN) 10 MG tablet Take 1 tablet (10 mg) by mouth 4 times daily as needed, Disp-20 tablet, R-0, Local Print      ondansetron (ZOFRAN ODT) 4 MG ODT tab Take 1 tablet (4 mg) by mouth every 8 hours as needed, Disp-10 tablet, R-0, Local Print      oxyCODONE-acetaminophen (PERCOCET) 5-325 MG per tablet Take 1-2 tablets by mouth every 4 hours as needed for pain, Disp-15 tablet, R-0, Local Print             Scribe Disclosure:  I, Raquel Torre, am serving as a scribe at 4:42 PM on 4/22/2017 to document services personally performed by Bonita Moore MD, based on my observations and the provider's statements to me.  Raquel Torre  4/22/2017   [facility] EMERGENCY DEPARTMENT       Bonita Moore MD  04/22/17 [id number]

## 2017-04-23 NOTE — DISCHARGE INSTRUCTIONS
Discharge Instructions  Headache    You were seen today for a headache. Headaches may be caused by many different things such as muscle tension, sinus inflammation, anxiety and stress, having too little sleep, too much alcohol, some medical conditions or injury. You may have a migraine, which is caused by changes in the blood vessels in your head.  At this time your doctor does not find that your headache is a sign of anything dangerous or life-threatening.  However, sometimes the signs of serious illness do not show up right away.  If you have new or worse symptoms, you may need to be seen again in the emergency department or by your primary doctor.      Return to the Emergency Department if:    You get a fever of 101 F or higher.    Your headache gets much worse.    You get a stiff neck with your headache.    You get a new headache that is different or worse than headaches you have had before.    You are vomiting and can t keep food or water down.    You have blurry or double vision or other problems with your eyes.    You have a new weakness on one side of your body.    You have difficulty with balance which is new.    You or your family thinks you are confused.    You have a seizure or convulsion.    What can I do to help myself?    Pain medications - You may take a pain medication such as Tylenol  (acetaminophen), Advil , Nuprin  (ibuprofen) or Aleve  (naproxen).  If you have been given a narcotic such as Vicodin  (hydrocodone with acetaminophen), Percocet  (oxycodone with acetaminophen), codeine, or a muscle relaxant such as Flexeril  (cyclobenzaprine) or Soma  (carisoprodol), do not drive for four hours after you have taken it. If the narcotic contains Tylenol  (acetaminophen), do not take Tylenol  with it. All narcotics will cause constipation, so eat a high fiber diet.        Take a pain reliever as soon as you notice symptoms.  Starting medications as soon as you start to have symptoms may lessen the  amount of pain you have.    Relaxing in a quiet, dark room may help.    Get enough sleep and eat meals regularly.    Schedule an appointment with your primary physician as instructed, or at least within 1 week.    You may need to watch for certain foods or other things which may trigger your headaches.  Keeping a journal of your headaches and possible triggers may help you and your primary doctor to identify things which you should avoid which may be causing your headaches.  If you were given a prescription for medicine here today, be sure to read all of the information (including the package insert) that comes with your prescription.  This will include important information about the medicine, its side effects, and any warnings that you need to know about.  The pharmacist who fills the prescription can provide more information and answer questions you may have about the medicine.  If you have questions or concerns that the pharmacist cannot address, please call or return to the Emergency Department.   Opioid Medication Information    Pain medications are among the most commonly prescribed medicines, so we are including this information for all our patients. If you did not receive pain medication or get a prescription for pain medicine, you can ignore it.     You may have been given a prescription for an opioid (narcotic) pain medicine and/or have received a pain medicine while here in the Emergency Department. These medicines can make you drowsy or impaired. You must not drive, operate dangerous equipment, or engage in any other dangerous activities while taking these medications. If you drive while taking these medications, you could be arrested for DUI, or driving under the influence. Do not drink any alcohol while you are taking these medications.     Opioid pain medications can cause addiction. If you have a history of chemical dependency of any type, you are at a higher risk of becoming addicted to pain  medications.  Only take these prescribed medications to treat your pain when all other options have been tried. Take it for as short a time and as few doses as possible. Store your pain pills in a secure place, as they are frequently stolen and provide a dangerous opportunity for children or visitors in your house to start abusing these powerful medications. We will not replace any lost or stolen medicine.  As soon as your pain is better, you should flush all your remaining medication.     Many prescription pain medications contain Tylenol  (acetaminophen), including Vicodin , Tylenol #3 , Norco , Lortab , and Percocet .  You should not take any extra pills of Tylenol  if you are using these prescription medications or you can get very sick.  Do not ever take more than 3000 mg of acetaminophen in any 24 hour period.    All opioids tend to cause constipation. Drink plenty of water and eat foods that have a lot of fiber, such as fruits, vegetables, prune juice, apple juice and high fiber cereal.  Take a laxative if you don t move your bowels at least every other day. Miralax , Milk of Magnesia, Colace , or Senna  can be used to keep you regular.      Remember that you can always come back to the Emergency Department if you are not able to see your regular doctor in the amount of time listed above, if you get any new symptoms, or if there is anything that worries you.

## 2017-04-24 ENCOUNTER — HOSPITAL ENCOUNTER (EMERGENCY)
Facility: CLINIC | Age: 37
Discharge: HOME | End: 2017-04-24
Attending: EMERGENCY MEDICINE | Admitting: EMERGENCY MEDICINE
Payer: COMMERCIAL

## 2017-04-24 ENCOUNTER — ANESTHESIA (OUTPATIENT)
Dept: SURGERY | Facility: CLINIC | Age: 37
End: 2017-04-24
Payer: COMMERCIAL

## 2017-04-24 ENCOUNTER — ANESTHESIA EVENT (OUTPATIENT)
Dept: SURGERY | Facility: CLINIC | Age: 37
End: 2017-04-24
Payer: COMMERCIAL

## 2017-04-24 VITALS
HEART RATE: 93 BPM | RESPIRATION RATE: 18 BRPM | OXYGEN SATURATION: 100 % | TEMPERATURE: 97 F | SYSTOLIC BLOOD PRESSURE: 123 MMHG | DIASTOLIC BLOOD PRESSURE: 81 MMHG

## 2017-04-24 DIAGNOSIS — G97.1 POST LUMBAR PUNCTURE HEADACHE: ICD-10-CM

## 2017-04-24 LAB
ANION GAP SERPL CALCULATED.3IONS-SCNC: 7 MMOL/L (ref 3–14)
BUN SERPL-MCNC: 7 MG/DL (ref 7–30)
CALCIUM SERPL-MCNC: 8.6 MG/DL (ref 8.5–10.1)
CHLORIDE SERPL-SCNC: 105 MMOL/L (ref 94–109)
CO2 SERPL-SCNC: 26 MMOL/L (ref 20–32)
CREAT SERPL-MCNC: 0.72 MG/DL (ref 0.52–1.04)
GFR SERPL CREATININE-BSD FRML MDRD: ABNORMAL ML/MIN/1.7M2
GLUCOSE SERPL-MCNC: 178 MG/DL (ref 70–99)
MAGNESIUM SERPL-MCNC: 1.9 MG/DL (ref 1.6–2.3)
POTASSIUM SERPL-SCNC: 3 MMOL/L (ref 3.4–5.3)
SODIUM SERPL-SCNC: 138 MMOL/L (ref 133–144)

## 2017-04-24 PROCEDURE — 96376 TX/PRO/DX INJ SAME DRUG ADON: CPT | Mod: 59

## 2017-04-24 PROCEDURE — 80048 BASIC METABOLIC PNL TOTAL CA: CPT | Performed by: EMERGENCY MEDICINE

## 2017-04-24 PROCEDURE — 25000132 ZZH RX MED GY IP 250 OP 250 PS 637: Performed by: EMERGENCY MEDICINE

## 2017-04-24 PROCEDURE — 62273 INJECT EPIDURAL PATCH: CPT

## 2017-04-24 PROCEDURE — 40000671 ZZH STATISTIC ANESTHESIA CASE

## 2017-04-24 PROCEDURE — 96375 TX/PRO/DX INJ NEW DRUG ADDON: CPT

## 2017-04-24 PROCEDURE — 25000128 H RX IP 250 OP 636: Performed by: EMERGENCY MEDICINE

## 2017-04-24 PROCEDURE — 96361 HYDRATE IV INFUSION ADD-ON: CPT

## 2017-04-24 PROCEDURE — 96374 THER/PROPH/DIAG INJ IV PUSH: CPT | Mod: 59

## 2017-04-24 PROCEDURE — 83735 ASSAY OF MAGNESIUM: CPT | Performed by: EMERGENCY MEDICINE

## 2017-04-24 PROCEDURE — 99285 EMERGENCY DEPT VISIT HI MDM: CPT | Mod: 25

## 2017-04-24 RX ORDER — OXYCODONE AND ACETAMINOPHEN 5; 325 MG/1; MG/1
1-2 TABLET ORAL EVERY 4 HOURS PRN
Qty: 15 TABLET | Refills: 0 | Status: SHIPPED | OUTPATIENT
Start: 2017-04-24 | End: 2017-06-02

## 2017-04-24 RX ORDER — HYDROMORPHONE HYDROCHLORIDE 1 MG/ML
0.5 INJECTION, SOLUTION INTRAMUSCULAR; INTRAVENOUS; SUBCUTANEOUS
Status: DISCONTINUED | OUTPATIENT
Start: 2017-04-24 | End: 2017-04-24

## 2017-04-24 RX ORDER — MORPHINE SULFATE 4 MG/ML
4 INJECTION, SOLUTION INTRAMUSCULAR; INTRAVENOUS
Status: DISCONTINUED | OUTPATIENT
Start: 2017-04-24 | End: 2017-04-24 | Stop reason: HOSPADM

## 2017-04-24 RX ORDER — ONDANSETRON 2 MG/ML
4 INJECTION INTRAMUSCULAR; INTRAVENOUS ONCE
Status: COMPLETED | OUTPATIENT
Start: 2017-04-24 | End: 2017-04-24

## 2017-04-24 RX ORDER — POTASSIUM CHLORIDE 1500 MG/1
40 TABLET, EXTENDED RELEASE ORAL ONCE
Status: COMPLETED | OUTPATIENT
Start: 2017-04-24 | End: 2017-04-24

## 2017-04-24 RX ORDER — SODIUM CHLORIDE 9 MG/ML
1000 INJECTION, SOLUTION INTRAVENOUS CONTINUOUS
Status: DISCONTINUED | OUTPATIENT
Start: 2017-04-24 | End: 2017-04-24 | Stop reason: HOSPADM

## 2017-04-24 RX ORDER — CAFFEINE 200 MG
200 TABLET ORAL ONCE
Status: COMPLETED | OUTPATIENT
Start: 2017-04-24 | End: 2017-04-24

## 2017-04-24 RX ORDER — METOCLOPRAMIDE HYDROCHLORIDE 5 MG/ML
5 INJECTION INTRAMUSCULAR; INTRAVENOUS ONCE
Status: COMPLETED | OUTPATIENT
Start: 2017-04-24 | End: 2017-04-24

## 2017-04-24 RX ADMIN — SODIUM CHLORIDE 1000 ML: 9 INJECTION, SOLUTION INTRAVENOUS at 18:57

## 2017-04-24 RX ADMIN — CAFFEINE 200 MG: 200 TABLET ORAL at 19:21

## 2017-04-24 RX ADMIN — POTASSIUM CHLORIDE 40 MEQ: 1500 TABLET, EXTENDED RELEASE ORAL at 21:08

## 2017-04-24 RX ADMIN — METOCLOPRAMIDE 5 MG: 5 INJECTION, SOLUTION INTRAMUSCULAR; INTRAVENOUS at 20:59

## 2017-04-24 RX ADMIN — MORPHINE SULFATE 4 MG: 4 INJECTION, SOLUTION INTRAMUSCULAR; INTRAVENOUS at 21:04

## 2017-04-24 RX ADMIN — MORPHINE SULFATE 4 MG: 4 INJECTION, SOLUTION INTRAMUSCULAR; INTRAVENOUS at 19:17

## 2017-04-24 RX ADMIN — ONDANSETRON 4 MG: 2 INJECTION INTRAMUSCULAR; INTRAVENOUS at 18:57

## 2017-04-24 RX ADMIN — SODIUM CHLORIDE 1000 ML: 9 INJECTION, SOLUTION INTRAVENOUS at 20:06

## 2017-04-24 ASSESSMENT — ENCOUNTER SYMPTOMS
WEAKNESS: 0
FEVER: 0
NAUSEA: 1
NECK PAIN: 1
ABDOMINAL PAIN: 0
HEADACHES: 1
NUMBNESS: 0
BACK PAIN: 1

## 2017-04-24 NOTE — ED PROVIDER NOTES
History     Chief Complaint:  Headache     HPI   Yvonne Diez is a 36 year old female who presents with a headache. The patient was in the ED 2 days ago for a headache, nausea, vomiting, and diarrhea, had an extensive workup including head CT, head and neck CTA, and LP, which were all normal and was discharged home with Reglan, Zofran, and percocet. She states that her headache, back pain, neck pain, and nausea persisted since that time and have been improved and manageable with the medication she was discharged with. After returning home, the patient noticed that when she stood up her headache would worsen and was alleviated by lying down. This afternoon, when the patient was standing for a longer period of time and exerting herself more than previously, her headache became more severe, prompting a visit to the ED. She states that she has not been eating normally, but has been drinking fluids. The patient no longer has a fever. No vision changes. No numbness or tingling. No weakness in her hands or feet. No abdominal pain. No other new symptoms. The patient drinks 1 cup of coffee and 2-3 sodas daily, has not had this much caffeine in the past few days.     Allergies:  Sulfa drugs     Medications:    Reglan  Zofran  Percocet  Flonase  Celexa  Microgestin FE  Diphenhydramine   Multivitamin  Calcium carb-cholecalciferol  Ibuprofen     Past Medical History:    Endometriosis  Insomnia  Anxiety  Depression    Past Surgical History:    Laparoscopy, surgical, abdomen, peritoneum & omentum;  [place] teeth    Family History:    Endocrine disease - mother    Social History:  Marital Status:   Presents to the ED alone  Tobacco Use: former smoker  Alcohol Use: positive  PCP: Herbert Silva     Review of Systems   Constitutional: Negative for fever.   Eyes: Negative for visual disturbance.   Gastrointestinal: Positive for nausea. Negative for abdominal pain.   Musculoskeletal: Positive for back pain and neck pain.    Neurological: Positive for headaches. Negative for weakness and numbness.   All other systems reviewed and are negative.    Physical Exam   First Vitals:  BP: (!) 139/97  Pulse: 93  Temp: 97  F (36.1  C)  Resp: 16  SpO2: 100 %    Physical Exam  VITAL SIGNS: BP (!) 139/97  Pulse 93  Temp 97  F (36.1  C)  Resp 16  LMP 04/22/2017  SpO2 100%  Breastfeeding? No   Constitutional:  Well developed, Well nourished, comfortable appearing   HENT:  Bilateral external ears normal, Mucous membranes moist, Nose normal. Neck- Normal range of motion, Supple  Eyes: PERRL, EOMI, conjunctivae unremarkable  Respiratory:  Normal breath sounds, No respiratory distress, No wheezing,  Cardiovascular:  Normal heart rate, Normal rhythm, No murmurs,    Musculoskeletal:  Intact distal pulses, No edema, grossly unremarkable range of motion   Integument:  Warm, Dry. 2 small puncture wounds visible over the lumbar spine without erythema, hematoma, or any other finding.  Neurological: Alert, attentive and oriented to person, place and time  Cranial nerves 2-12 intact;   5/5 strength throughout the upper and lower extremities;   Sensation intact to light touch throughout the upper and lower extremities;   Psychiatric:  Mood and affect normal.     Emergency Department Course   Laboratory:  BMP: potassium 3.0 (L), glucose 178 (H),, otherwise WNL (Creatinine 0.72)  Magnesium: 1.9    Interventions:  (1857) Normal Saline, 1 liter, IV bolus  (2110) Normal Saline, 1 liter, IV bolus  (2104) Morphine, 4 mg, IV injection  (1917) Morphine, 4 mg, IV injection  (1921) Caffeine, 200 mg, PO  (1857) Zofran, 4 mg, IV injection  (2108) Potassium chloride, 40 mEq, PO  (2059) Reglan, 5 mg, IV    Emergency Department Course:  Nursing notes and vitals reviewed.  I performed an exam of the patient as documented above.   A peripheral IV was established. Blood was drawn from the patient. This was sent for laboratory testing, findings above.   (1929) I consulted with  Dr. Roberts of anesthesiology regarding the patient. The agreed to perform a blood patch procedure for the patient.   (1931) I rechecked the patient and updated her on the plan.  Anesthesiology performed a blood patch in the ED.  (2042) I rechecked the patient. The patient has a residual headache, but is overall feeling better.  Findings and plan explained to the patient. Patient discharged home with instructions regarding supportive care, medications, and reasons to return. The importance of close follow-up was reviewed. The patient was prescribed Percocet.  I personally reviewed the laboratory results with the patient and answered all related questions prior to discharge.     Impression & Plan    Medical Decision Making:  Yvonne Diez is a 36 year old female who presents for evaluation of headache, with an extensive previous workup which did not show any significant cause, includingan LP at that time.  The patient currently feels fairly well except for an intense and persistent positional headache.  The patient's headache is resolved with laying flat and becomes unbearable with sitting/standing.  This headache appears classically consistent with a lumbar puncture headache. We tried supportive measures with little significant relief; fortunately anesthesiology was able to give a blood patch.    I do not have high clinical suspicion for other etiology for the patient's headache as it is classic for post dural puncture headache. LP performed initially for fever and headache and was negative.      Though pt had residual pain, she was overall feeling better. We discussed continuing supportive measures and close f/u with PMD for any persistent symptoms. She was comfortable with plan.     Diagnosis:    ICD-10-CM    1. Post lumbar puncture headache G97.1        Disposition:  Discharge to home.    Discharge medications:  Modified Medications    Modified Medication Previous Medication    OXYCODONE-ACETAMINOPHEN  (PERCOCET) 5-325 MG PER TABLET oxyCODONE-acetaminophen (PERCOCET) 5-325 MG per tablet       Take 1-2 tablets by mouth every 4 hours as needed for pain    Take 1-2 tablets by mouth every 4 hours as needed for pain       I, Antonio Lin, am serving as a scribe on 4/24/2017 at 6:25 PM to personally document services performed by Dr. Cordero based on my observations and the provider's statements to me.       Mia Cordero MD  04/25/17 [id number]

## 2017-04-24 NOTE — ED AVS SNAPSHOT
[facility] Emergency Department    [address]    [facility] [address]    Phone:  [phone]    Fax:  [phone]                                       Yvonne Diez   MRN: [id number]    Department:  [facility] Emergency Department   Date of Visit:  4/24/2017           After Visit Summary Signature Page     I have received my discharge instructions, and my questions have been answered. I have discussed any challenges I see with this plan with the nurse or doctor.    ..........................................................................................................................................  Patient/Patient Representative Signature      ..........................................................................................................................................  Patient Representative Print Name and Relationship to Patient    ..................................................               ................................................  Date                                            Time    ..........................................................................................................................................  Reviewed by Signature/Title    ...................................................              ..............................................  Date                                                            Time

## 2017-04-24 NOTE — ED NOTES
Arrives to ED with headache, had LP Saturday HA worse today with being upright concern for CSF leak. A/ox 3.

## 2017-04-24 NOTE — ED AVS SNAPSHOT
[facility] Emergency Department    [address] [place] [address]    Phone:  [phone]    Fax:  [phone]                                       Yvonne Diez   MRN: [id number]    Department:  [facility] Emergency Department   Date of Visit:  4/24/2017           Patient Information     Date Of Birth          1980        Your diagnoses for this visit were:     Post lumbar puncture headache        You were seen by Mia Cordero MD.      Follow-up Information     Follow up with Herbert Silva MD. Schedule an appointment as soon as possible for a visit in 3 days.    Specialty:  Family Practice    Contact information:    [facility]  [address]  [place] [address]  [phone]          Discharge Instructions         Headache After Spinal Tap (With Patch)    Spinal fluid fills the space around the brain and spinal cord. This fluid acts like a cushion. During a spinal tap procedure, a needle is passed through the membrane that surrounds the spinal canal.  This allows the doctor to remove a small sample of spinal fluid. This fluid provides important information about the health of your brain and spinal cord. Normally, as the needle is removed, the puncture hole seals off and no more fluid comes out. However, sometimes the hole does not seal properly and spinal fluid leaks into the nearby tissues.  If there is a loss of too much spinal fluid from a leak at the puncture site, the spinal fluid pressure goes down and a headache occurs. This headache may be mild or severe. The pain is often worse when you sit or stand and gets better or goes away when you lie down. There may also be dizziness, nausea, and blurred vision. The headache usually goes away within 24 hours. But when the headache is very severe or lasts longer than 24 hours special treatments can be given to stop the leak.  Your leak has been treated with a  blood patch.  This involves  drawing a sample of your own blood and injecting it next to the puncture hole. This forms a blood clot that presses against the hole to stop the leak and increase the fluid pressure. Most people start to feel better within 30 minutes after the procedure. Improvement usually continues over the next several days. In a small percent of cases, the headache continues or may come back. In that case, a second procedure or another treatment may be needed.  Home care    Once you get home, rest lying down for 12 hours.    As much as possible, avoid sitting or standing during the first 12 hours. It's OK to get up to eat and go to the bathroom for short periods of time.    Drink extra fluids for the next 24 hours. On a normal day, healthy men should have about 125 ounces (3.7 liters) of total water, from all beverages and food per day. Healthy women should take in about 91 ounces (2.7 liters) of total water, from all beverages and food per day.    Caffeine can help this type of headache. Unless told otherwise, you may drink coffee or another caffeinated drink.    If you were given medicine for nausea, take it as directed.  Follow-up care  Follow up with your healthcare provider, or as advised.  When to seek medical advice  Call your healthcare provider if any of these occur:    Headache remains severe for more than a few hours after the procedure    Headache gets worse with sitting or standing    Vomiting repeatedly (unable to keep liquids down)    Numbness or tingling of the legs    Unable to pass urine    Bleeding or pain at the injection site    Confusion or trouble thinking    Fever (1 degree above your normal temperature) lasting for 24 to 48 hours, or whatever your healthcare provider told you to report based on your medical condition    3280-0718 [employer]. [address] [place] [address], [place] [address]. All rights reserved. This information is not intended as a substitute for professional medical care. Always  follow your healthcare professional's instructions.          24 Hour Appointment Hotline       To make an appointment at any [facility], call [phone] ([phone]). If you don't have a family doctor or clinic, we will help you find one. [facility] clinics are conveniently located to serve the needs of you and your family.             Review of your medicines      Our records show that you are taking the medicines listed below. If these are incorrect, please call your family doctor or clinic.        Dose / Directions Last dose taken    ADVIL 200 MG capsule   Dose:  200 mg   Generic drug:  ibuprofen        Take 200 mg by mouth every 4 hours as needed. 2 tablets as needed for pain   Refills:  0        BUSPAR 15 MG tablet   Dose:  15 mg   Generic drug:  busPIRone        Take 15 mg by mouth daily.   Refills:  0        CALCIUM 1000 + D PO        Take  by mouth daily.   Refills:  0        celeXA 40 MG tablet   Dose:  40 mg   Generic drug:  citalopram        Take 40 mg by mouth daily   Refills:  0        clonazePAM 1 MG tablet   Commonly known as:  klonoPIN        prn   Refills:  0        fluticasone 50 MCG/ACT spray   Commonly known as:  FLONASE   Dose:  2 spray   Quantity:  1 Bottle        Spray 2 sprays into both nostrils daily   Refills:  0        HYDROcodone-acetaminophen 5-325 MG per tablet   Commonly known as:  NORCO   Dose:  1 tablet        Take 1 tablet by mouth 1 tab DAILY prn   Refills:  0        metoclopramide 10 MG tablet   Commonly known as:  REGLAN   Dose:  10 mg   Quantity:  20 tablet        Take 1 tablet (10 mg) by mouth 4 times daily as needed   Refills:  0        MULTI MAX PO        Take  by mouth daily.   Refills:  0        norethindrone-ethinyl estradiol-iron 1.5-30 MG-MCG per tablet   Commonly known as:  MICROGESTIN FE1.5/30   Dose:  1 tablet   Quantity:  3 Package        Take 1 tablet by mouth daily.   Refills:  3        ondansetron 4 MG ODT tab   Commonly known as:  ZOFRAN ODT   Dose:  4  mg   Quantity:  10 tablet        Take 1 tablet (4 mg) by mouth every 8 hours as needed   Refills:  0        oxyCODONE-acetaminophen 5-325 MG per tablet   Commonly known as:  PERCOCET   Dose:  1-2 tablet   Quantity:  15 tablet        Take 1-2 tablets by mouth every 4 hours as needed for pain   Refills:  0        WAL-ZACKERY MAXIMUM STRENGTH 50 MG Caps   Dose:  1 capsule   Generic drug:  DiphenhydrAMINE HCl (Sleep)        Take 1 capsule by mouth At Bedtime.   Refills:  0                Prescriptions were sent or printed at these locations (1 Prescription)                   Other Prescriptions                Printed at Department/Unit printer (1 of 1)         oxyCODONE-acetaminophen (PERCOCET) 5-325 MG per tablet                Procedures and tests performed during your visit     Basic metabolic panel    Magnesium      Orders Needing Specimen Collection     None      Pending Results     Date and Time Order Name Status Description    4/22/2017 1821 CSF Culture Aerobic Bacterial Preliminary     4/22/2017 1650 Blood culture ONE site Preliminary             Pending Culture Results     Date and Time Order Name Status Description    4/22/2017 1821 CSF Culture Aerobic Bacterial Preliminary     4/22/2017 1650 Blood culture ONE site Preliminary             Test Results From Your Hospital Stay        4/24/2017  7:14 PM      Component Results     Component Value Ref Range & Units Status    Sodium 138 133 - 144 mmol/L Final    Potassium 3.0 (L) 3.4 - 5.3 mmol/L Final    Chloride 105 94 - 109 mmol/L Final    Carbon Dioxide 26 20 - 32 mmol/L Final    Anion Gap 7 3 - 14 mmol/L Final    Glucose 178 (H) 70 - 99 mg/dL Final    Urea Nitrogen 7 7 - 30 mg/dL Final    Creatinine 0.72 0.52 - 1.04 mg/dL Final    GFR Estimate >90  Non  GFR Calc   >60 mL/min/1.7m2 Final    GFR Estimate If Black >90   GFR Calc   >60 mL/min/1.7m2 Final    Calcium 8.6 8.5 - 10.1 mg/dL Final         4/24/2017  7:14 PM      Component  Results     Component Value Ref Range & Units Status    Magnesium 1.9 1.6 - 2.3 mg/dL Final                Clinical Quality Measure: Blood Pressure Screening     Your blood pressure was checked while you were in the emergency department today. The last reading we obtained was  BP: 123/81 . Please read the guidelines below about what these numbers mean and what you should do about them.  If your systolic blood pressure (the top number) is less than 120 and your diastolic blood pressure (the bottom number) is less than 80, then your blood pressure is normal. There is nothing more that you need to do about it.  If your systolic blood pressure (the top number) is 120-139 or your diastolic blood pressure (the bottom number) is 80-89, your blood pressure may be higher than it should be. You should have your blood pressure rechecked within a year by a primary care provider.  If your systolic blood pressure (the top number) is 140 or greater or your diastolic blood pressure (the bottom number) is 90 or greater, you may have high blood pressure. High blood pressure is treatable, but if left untreated over time it can put you at risk for heart attack, stroke, or kidney failure. You should have your blood pressure rechecked by a primary care provider within the next 4 weeks.  If your provider in the emergency department today gave you specific instructions to follow-up with your doctor or provider even sooner than that, you should follow that instruction and not wait for up to 4 weeks for your follow-up visit.        Thank you for choosing [facility]       Thank you for choosing [facility] for your care. Our goal is always to provide you with excellent care. Hearing back from our patients is one way we can continue to improve our services. Please take a few minutes to complete the written survey that you may receive in the mail after you visit with us. Thank you!        [email or web address] Information     [facility] gives you secure access to  your electronic health record. If you see a primary care provider, you can also send messages to your care team and make appointments. If you have questions, please call your primary care clinic.  If you do not have a primary care provider, please call [phone] and they will assist you.        Care EveryWhere ID     This is your Care EveryWhere ID. This could be used by other organizations to access your [facility] medical records  [id number]        After Visit Summary       This is your record. Keep this with you and show to your community pharmacist(s) and doctor(s) at your next visit.

## 2017-04-24 NOTE — LETTER
[facility] EMERGENCY DEPARTMENT  [address]  [facility] [address]  [phone]    2017    Yvonne Diez  [address] [place] [address]  [phone] (home)     : 1980      To Whom it may concern:    Yvonne Diez was seen in our Emergency Department today, 2017.  She should be excused from work through the .    Sincerely,        Mia Cordero

## 2017-04-24 NOTE — LETTER
[facility] EMERGENCY DEPARTMENT  [address] [place] [address]  [phone]    2017    Yvonne Diez  [address] [place] [facility] [place] [facility] [address]  [phone] (home)     : 1980      To Whom it may concern:    Yvonne Diez was seen in our Emergency Department today, 2017.  I expect her condition to improve over the next 3 days.  She may return to work/school when improved.    Sincerely,        Robby Dutton

## 2017-04-25 NOTE — ANESTHESIA POSTPROCEDURE EVALUATION
Patient: Yvonne Diez    * No procedures listed *    Diagnosis:* No pre-op diagnosis entered *  Diagnosis Additional Information: Post dural puncture headache   Dr Roberts    Anesthesia Type:  Other    Note:  Anesthesia Post Evaluation    Patient location during evaluation: ED  Patient participation: Able to fully participate in evaluation  Level of consciousness: awake and alert  Pain management: adequate  Airway patency: patent  Cardiovascular status: acceptable  Respiratory status: acceptable  Hydration status: stable  PONV: none             Last vitals:  Vitals:    04/24/17 1815 04/24/17 1915 04/24/17 1930   BP: (!) 139/97     Pulse:      Resp:      Temp:      SpO2:  100% 100%         Electronically Signed By: Juancho Roberts MD  April 24, 2017  8:26 PM

## 2017-04-25 NOTE — DISCHARGE INSTRUCTIONS
Headache After Spinal Tap (With Patch)    Spinal fluid fills the space around the brain and spinal cord. This fluid acts like a cushion. During a spinal tap procedure, a needle is passed through the membrane that surrounds the spinal canal.  This allows the doctor to remove a small sample of spinal fluid. This fluid provides important information about the health of your brain and spinal cord. Normally, as the needle is removed, the puncture hole seals off and no more fluid comes out. However, sometimes the hole does not seal properly and spinal fluid leaks into the nearby tissues.  If there is a loss of too much spinal fluid from a leak at the puncture site, the spinal fluid pressure goes down and a headache occurs. This headache may be mild or severe. The pain is often worse when you sit or stand and gets better or goes away when you lie down. There may also be dizziness, nausea, and blurred vision. The headache usually goes away within 24 hours. But when the headache is very severe or lasts longer than 24 hours special treatments can be given to stop the leak.  Your leak has been treated with a  blood patch.  This involves drawing a sample of your own blood and injecting it next to the puncture hole. This forms a blood clot that presses against the hole to stop the leak and increase the fluid pressure. Most people start to feel better within 30 minutes after the procedure. Improvement usually continues over the next several days. In a small percent of cases, the headache continues or may come back. In that case, a second procedure or another treatment may be needed.  Home care    Once you get home, rest lying down for 12 hours.    As much as possible, avoid sitting or standing during the first 12 hours. It's OK to get up to eat and go to the bathroom for short periods of time.    Drink extra fluids for the next 24 hours. On a normal day, healthy men should have about 125 ounces (3.7 liters) of total water, from  all beverages and food per day. Healthy women should take in about 91 ounces (2.7 liters) of total water, from all beverages and food per day.    Caffeine can help this type of headache. Unless told otherwise, you may drink coffee or another caffeinated drink.    If you were given medicine for nausea, take it as directed.  Follow-up care  Follow up with your healthcare provider, or as advised.  When to seek medical advice  Call your healthcare provider if any of these occur:    Headache remains severe for more than a few hours after the procedure    Headache gets worse with sitting or standing    Vomiting repeatedly (unable to keep liquids down)    Numbness or tingling of the legs    Unable to pass urine    Bleeding or pain at the injection site    Confusion or trouble thinking    Fever (1 degree above your normal temperature) lasting for 24 to 48 hours, or whatever your healthcare provider told you to report based on your medical condition    [id number] [employer]. [address] [place] [address], [place] [address]. All rights reserved. This information is not intended as a substitute for professional medical care. Always follow your healthcare professional's instructions.

## 2017-04-25 NOTE — ANESTHESIA PREPROCEDURE EVALUATION
PAC NOTE:       ANESTHESIA PRE EVALUATION:  Anesthesia Evaluation     . Pt has had prior anesthetic.            ROS/MED HX    ENT/Pulmonary:       Neurologic:       Cardiovascular:         METS/Exercise Tolerance:     Hematologic:         Musculoskeletal:         GI/Hepatic:         Renal/Genitourinary:         Endo:         Psychiatric:         Infectious Disease:         Malignancy:         Other:                             Anesthesia Plan      History & Physical Review  History and physical reviewed and following examination; no interval change.    ASA Status:  2 .        Plan for Other     Epidural blood patch in ED for LP headache      Postoperative Care      Consents                            .

## 2017-04-25 NOTE — ANESTHESIA PROCEDURE NOTES
"  Peripheral nerve/Neuraxial procedure note : epidural blood patch  Pre-Procedure  Performed by TONG ANDERSON  Referred by MONICA  Location: ED    Procedure Times:4/24/2017 7:33 PM and 4/24/2017 8:11 PM  Pre-Anesthestic Checklist: patient identified, IV checked, risks and benefits discussed, informed consent, pre-op evaluation and at physician/surgeon's request    Timeout  Correct Patient: Yes   Correct Procedure: Yes   Correct Site: Yes   Correct Laterality: N/A   Correct Position: Yes   Site Marked: N/A   .   Procedure Documentation    Diagnosis:LP headache.    Procedure:    Epidural blood patch.  Insertion Site:L3-4  (midline approach) Injection technique: LORT air   Local skin infiltrated with 2 mL of 1% lidocaine.  MARKUS at 6 cm     Patient Prep;mask, sterile gloves, povidone-iodine 7.5% surgical scrub, patient draped.  .  Needle: Touhy needle (17 G. 3.5 in). # of attempts: 1.  # of redirects: 1.  Spinal Needle: . . .     Assessment/Narrative  Paresthesias: No.  .  .  . Comments:  Pt had LP 2 days ago for HA workup. Results were all WNL per ER MD. Pt returns with clearly postural HA, described as 15 out of 10 scale. Discussed the etiology of PDPH's including diagrams, and the option for blood patch. Risks fully discussed including bleeding, infection, injury to nerves and other structures, back pain, worsened HA. Consent signed. Sitting, betadine prep x 3, draped, LA 1%lido, attx1, MARKUS to air. Blood draw proved difficult, per pt she has been very sick with this HA and intake has been poor. Several sites were attempted, each with betadine/alcohol prep, 8-9cc of blood obtained and injected w/o difficulty. Pt laid supine for 30 minutes. HA then reported as 6/10. 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 Pt to followup immed to ED if s/s of infection, neuro changes, or increased " back pain. F/u can also be thru anesthesia should she desire a 2nd blood patch, which is to include good preprocedure hydration so hopefully the blood draw would be more production ref volume. Armando

## 2017-04-26 ENCOUNTER — SURGERY (OUTPATIENT)
Age: 37
End: 2017-04-26

## 2017-04-26 ENCOUNTER — ANESTHESIA (OUTPATIENT)
Dept: SURGERY | Facility: CLINIC | Age: 37
End: 2017-04-26

## 2017-04-26 ENCOUNTER — HOSPITAL ENCOUNTER (OUTPATIENT)
Facility: CLINIC | Age: 37
Discharge: HOME | End: 2017-04-26
Attending: OBSTETRICS & GYNECOLOGY | Admitting: OBSTETRICS & GYNECOLOGY
Payer: COMMERCIAL

## 2017-04-26 VITALS
RESPIRATION RATE: 18 BRPM | TEMPERATURE: 98.2 F | OXYGEN SATURATION: 100 % | DIASTOLIC BLOOD PRESSURE: 79 MMHG | SYSTOLIC BLOOD PRESSURE: 137 MMHG

## 2017-04-26 PROCEDURE — 40000010 ZZH STATISTIC ANES STAT CODE-CRNA PER MINUTE

## 2017-04-26 PROCEDURE — 62273 INJECT EPIDURAL PATCH: CPT

## 2017-04-26 NOTE — OR NURSING
"Blood patch done - patient laying flat and states \"headach is already better\".  Pt talking/visiting with spouse, VSS, neuros intact.  Pt to lay for approx. 30 min and then slowly sit up.  No problems noted.    "

## 2017-04-26 NOTE — DISCHARGE INSTRUCTIONS
BLOOD PATCH DISCHARGE INSTRUCTIONS    LOW GRADE FEVER, MILD BACKACHE AND MILD NECK ACHE/STIFFNESS ARE NOT UNUSUAL AFTER  BLOOD PATCH.    YOU SHOULD CONTACT THE ANESTHESIOLOGIST IMMEDIATELY AT THE PHONE NUMBER BELOW IF ANY OF THE FOLLOWING OCCUR:    1.  BACKACHE OR NECK ACHE THAT IS PERSISTENT OR MORE THAN MODERATE.    2.  A FEVER GREATER THAN 100.5 DEGREES.    3.  LEG WEAKNESS OR NUMBNESS.    PLEASE CALL [phone] AND ASK FOR AN ANESTHESIOLOGIST OR CALL YOUR REGULAR DOCTOR IF YOU HAVE ANY PROBLEMS OR CONCERNS AFTER A BLOOD PATCH.

## 2017-04-26 NOTE — IP AVS SNAPSHOT
[facility] PreOP/PostOP    [address]    [facility] [address]    Phone:  [phone]    Fax:  [phone]                                       After Visit Summary   4/26/2017    Yvonne Diez    MRN: [id number]           After Visit Summary Signature Page     I have received my discharge instructions, and my questions have been answered. I have discussed any challenges I see with this plan with the nurse or doctor.    ..........................................................................................................................................  Patient/Patient Representative Signature      ..........................................................................................................................................  Patient Representative Print Name and Relationship to Patient    ..................................................               ................................................  Date                                            Time    ..........................................................................................................................................  Reviewed by Signature/Title    ...................................................              ..............................................  Date                                                            Time

## 2017-04-26 NOTE — IP AVS SNAPSHOT
MRN:[id number]                      After Visit Summary   4/26/2017    Yvonne Diez    MRN: [id number]           Thank you!     Thank you for choosing [facility] for your care. Our goal is always to provide you with excellent care. Hearing back from our patients is one way we can continue to improve our services. Please take a few minutes to complete the written survey that you may receive in the mail after you visit. If you would like to speak to someone directly about your visit please contact Patient Relations at [phone]. Thank you!          Patient Information     Date Of Birth          1980        About your hospital stay     You were admitted on:  April 26, 2017 You last received care in the:  [facility] PreOP/PostOP    You were discharged on:  April 26, 2017       Who to Call     For medical emergencies, please call 181.  For non-urgent questions about your medical care, please call your primary care provider or clinic, [phone]  For questions related to your surgery, please call your surgery clinic        Attending Provider     Provider Specialty    Agbeh, Cephas Mawuena, MD OB/Gyn       Primary Care Provider Office Phone # Fax #    Herbert Silva [phone]       [facility] [place] [place] [address] [address]        Further instructions from your care team       BLOOD PATCH DISCHARGE INSTRUCTIONS    LOW GRADE FEVER, MILD BACKACHE AND MILD NECK ACHE/STIFFNESS ARE NOT UNUSUAL AFTER  BLOOD PATCH.    YOU SHOULD CONTACT THE ANESTHESIOLOGIST IMMEDIATELY AT THE PHONE NUMBER BELOW IF ANY OF THE FOLLOWING OCCUR:    1.  BACKACHE OR NECK ACHE THAT IS PERSISTENT OR MORE THAN MODERATE.    2.  A FEVER GREATER THAN 100.5 DEGREES.    3.  LEG WEAKNESS OR NUMBNESS.    PLEASE CALL [phone] AND ASK FOR AN ANESTHESIOLOGIST OR CALL YOUR REGULAR DOCTOR IF YOU HAVE ANY PROBLEMS OR CONCERNS AFTER A BLOOD PATCH.       Pending Results     No  orders found from 4/24/2017 to 4/27/2017.            Admission Information     Date & Time Provider Department Dept. Phone    4/26/2017 Agbeh, Cephas Mawuena, MD [facility] PreOP/PostOP [phone]      Your Vitals Were     Blood Pressure Temperature Respirations Last Period Pulse Oximetry       126/79 98.6  F (37  C) (Temporal) 18 04/22/2017 100%       MyChart Information     [employer] gives you secure access to your electronic health record. If you see a primary care provider, you can also send messages to your care team and make appointments. If you have questions, please call your primary care clinic.  If you do not have a primary care provider, please call [phone] and they will assist you.        Care EveryWhere ID     This is your Care EveryWhere ID. This could be used by other organizations to access your [facility] medical records  [id number]           Review of your medicines      UNREVIEWED medicines. Ask your doctor about these medicines        Dose / Directions    ADVIL 200 MG capsule   Generic drug:  ibuprofen        Dose:  200 mg   Take 200 mg by mouth every 4 hours as needed. 2 tablets as needed for pain   Refills:  0       BUSPAR 15 MG tablet   Generic drug:  busPIRone        Dose:  15 mg   Take 15 mg by mouth daily.   Refills:  0       CALCIUM 1000 + D PO        Take  by mouth daily.   Refills:  0       celeXA 40 MG tablet   Generic drug:  citalopram        Dose:  40 mg   Take 40 mg by mouth daily   Refills:  0       clonazePAM 1 MG tablet   Commonly known as:  klonoPIN        prn   Refills:  0       fluticasone 50 MCG/ACT spray   Commonly known as:  FLONASE   Used for:  PND (post-nasal drip)        Dose:  2 spray   Spray 2 sprays into both nostrils daily   Quantity:  1 Bottle   Refills:  0       HYDROcodone-acetaminophen 5-325 MG per tablet   Commonly known as:  NORCO        Dose:  1 tablet   Take 1 tablet by mouth 1 tab DAILY prn   Refills:  0       metoclopramide 10 MG tablet   Commonly  known as:  REGLAN        Dose:  10 mg   Take 1 tablet (10 mg) by mouth 4 times daily as needed   Quantity:  20 tablet   Refills:  0       MULTI MAX PO        Take  by mouth daily.   Refills:  0       norethindrone-ethinyl estradiol-iron 1.5-30 MG-MCG per tablet   Commonly known as:  MICROGESTIN FE1.5/30        Dose:  1 tablet   Take 1 tablet by mouth daily.   Quantity:  3 Package   Refills:  3       ondansetron 4 MG ODT tab   Commonly known as:  ZOFRAN ODT   Ask about: Should I take this medication?        Dose:  4 mg   Take 1 tablet (4 mg) by mouth every 8 hours as needed   Quantity:  10 tablet   Refills:  0       oxyCODONE-acetaminophen 5-325 MG per tablet   Commonly known as:  PERCOCET        Dose:  1-2 tablet   Take 1-2 tablets by mouth every 4 hours as needed for pain   Quantity:  15 tablet   Refills:  0       WAL-ZACKERY MAXIMUM STRENGTH 50 MG Caps   Generic drug:  DiphenhydrAMINE HCl (Sleep)        Dose:  1 capsule   Take 1 capsule by mouth At Bedtime.   Refills:  0                Protect others around you: Learn how to safely use, store and throw away your medicines at www.disposemymeds.org.             Medication List: This is a list of all your medications and when to take them. Check marks below indicate your daily home schedule. Keep this list as a reference.      Medications           Morning Afternoon Evening Bedtime As Needed    ADVIL 200 MG capsule   Take 200 mg by mouth every 4 hours as needed. 2 tablets as needed for pain   Generic drug:  ibuprofen                                BUSPAR 15 MG tablet   Take 15 mg by mouth daily.   Generic drug:  busPIRone                                CALCIUM 1000 + D PO   Take  by mouth daily.                                celeXA 40 MG tablet   Take 40 mg by mouth daily   Generic drug:  citalopram                                clonazePAM 1 MG tablet   Commonly known as:  klonoPIN   prn                                fluticasone 50 MCG/ACT spray   Commonly known as:   FLONASE   Spray 2 sprays into both nostrils daily                                HYDROcodone-acetaminophen 5-325 MG per tablet   Commonly known as:  NORCO   Take 1 tablet by mouth 1 tab DAILY prn                                metoclopramide 10 MG tablet   Commonly known as:  REGLAN   Take 1 tablet (10 mg) by mouth 4 times daily as needed                                MULTI MAX PO   Take  by mouth daily.                                norethindrone-ethinyl estradiol-iron 1.5-30 MG-MCG per tablet   Commonly known as:  MICROGESTIN FE1.5/30   Take 1 tablet by mouth daily.                                oxyCODONE-acetaminophen 5-325 MG per tablet   Commonly known as:  PERCOCET   Take 1-2 tablets by mouth every 4 hours as needed for pain                                WAL-ZACKERY MAXIMUM STRENGTH 50 MG Caps   Take 1 capsule by mouth At Bedtime.   Generic drug:  DiphenhydrAMINE HCl (Sleep)                                  ASK your doctor about these medications           Morning Afternoon Evening Bedtime As Needed    ondansetron 4 MG ODT tab   Commonly known as:  ZOFRAN ODT   Take 1 tablet (4 mg) by mouth every 8 hours as needed   Ask about: Should I take this medication?

## 2017-04-27 LAB
BACTERIA SPEC CULT: NO GROWTH
MICRO REPORT STATUS: NORMAL
SPECIMEN SOURCE: NORMAL

## 2017-04-28 LAB
BACTERIA SPEC CULT: NO GROWTH
Lab: NORMAL
MICRO REPORT STATUS: NORMAL
SPECIMEN SOURCE: NORMAL

## 2017-05-01 ENCOUNTER — ANESTHESIA EVENT (OUTPATIENT)
Dept: SURGERY | Facility: CLINIC | Age: 37
End: 2017-05-01

## 2017-05-10 NOTE — ANESTHESIA PROCEDURE NOTES
Peripheral nerve/Neuraxial procedure note : epidural blood patch  Pre-Procedure  Performed by NICHOLE CHERY  Location: pre-op      Pre-Anesthestic Checklist: patient identified, IV checked, site marked, risks and benefits discussed, informed consent, monitors and equipment checked, pre-op evaluation and at physician/surgeon's request    Timeout  Correct Patient: Yes   Correct Procedure: Yes   Correct Site: Yes   Correct Laterality: N/A   Correct Position: Yes   Site Marked: N/A   .   Procedure Documentation    .    Procedure:    Epidural blood patch.  Insertion Site:L3-4  (midline approach) Injection technique: LORT saline   Local skin infiltrated with mL of 1% lidocaine.       Patient Prep;mask, sterile gloves, povidone-iodine 7.5% surgical scrub, patient draped.  .  Needle: Touhy needle (17 G. 3.5 in). .  Spinal Needle: . . .     Assessment/Narrative  Paresthesias: No.  .  .  No aspiration negative for heme or CSF  . Comments:  .Lumbar Epidural Blood Patch  Dx:  Post-dural Puncture Headache  Patient: Yvonne Diez   Date:  4- late entry due to EPIC linked encounters    The patient had a diagnostic Lumbar Puncture and has since developed a positional Post-Dural Puncture Headache.  Diagnostic studies were negative.  PDPHA was explained to patient. Conservative therapy options vs epidural blood patch discussed with the patient, including procedure and risks. The patient appears to understand, consents and agrees to proceed.  The patient was placed in the left lateral decubitus position. Betadine prep times three.  Lidocaine local.  A17 g Tuohy needle was used in a MARKUS technique to identify the epidural space .  Betadine prep to arm vein and 20 cc of blood aseptically with drawn and injected into epidural space. Pt tolerated procedure well.   Post-blood patch instrucion sheet given to the patient. The patient will call if questions/problems.   Nichole Chery,

## 2017-05-10 NOTE — ANESTHESIA POSTPROCEDURE EVALUATION
Patient: Yvonne Diez    Procedure(s):  EPIDURAL BLOOD PATCH - Wound Class: I-Clean    Diagnosis:headache  Diagnosis Additional Information: Post dural puncture HA    Anesthesia Type:  Epidural    Note:  Anesthesia Post Evaluation    Patient location during evaluation: PACU  Patient participation: Able to fully participate in evaluation  Level of consciousness: awake  Pain management: adequate  Airway patency: patent  Cardiovascular status: acceptable  Respiratory status: acceptable  Hydration status: acceptable  PONV: controlled     Anesthetic complications: None    Comments: Late entry due to EPIC linked encounters  JOCELYN Chery        Last vitals:  Vitals:    04/26/17 1630 04/26/17 1645 04/26/17 1703   BP: 123/74 137/79 137/79   Resp:      Temp:   98.2  F (36.8  C)   SpO2: 100% 100%          Electronically Signed By: Herman Chery DO  May 9, 2017  8:52 PM

## 2017-06-02 ENCOUNTER — OFFICE VISIT (OUTPATIENT)
Dept: PEDIATRICS | Facility: CLINIC | Age: 37
End: 2017-06-02
Payer: COMMERCIAL

## 2017-06-02 VITALS
WEIGHT: 167.5 LBS | OXYGEN SATURATION: 98 % | HEIGHT: 67 IN | SYSTOLIC BLOOD PRESSURE: 92 MMHG | HEART RATE: 76 BPM | BODY MASS INDEX: 26.29 KG/M2 | TEMPERATURE: 97.4 F | DIASTOLIC BLOOD PRESSURE: 56 MMHG

## 2017-06-02 DIAGNOSIS — J02.9 ACUTE PHARYNGITIS, UNSPECIFIED ETIOLOGY: Primary | ICD-10-CM

## 2017-06-02 LAB
DEPRECATED S PYO AG THROAT QL EIA: NORMAL
MICRO REPORT STATUS: NORMAL
SPECIMEN SOURCE: NORMAL

## 2017-06-02 PROCEDURE — 87880 STREP A ASSAY W/OPTIC: CPT | Performed by: INTERNAL MEDICINE

## 2017-06-02 PROCEDURE — 99213 OFFICE O/P EST LOW 20 MIN: CPT | Performed by: INTERNAL MEDICINE

## 2017-06-02 PROCEDURE — 87081 CULTURE SCREEN ONLY: CPT | Performed by: INTERNAL MEDICINE

## 2017-06-02 NOTE — PROGRESS NOTES
"  SUBJECTIVE:                                                    Yvonne Diez is a 36 year old female who presents to clinic today for the following health issues:    RESPIRATORY SYMPTOMS      Duration: 2 days    Description  sore throat, ear pain bilateral, headache, fatigue/malaise, hoarse voice and myalgias    Severity: moderate    Accompanying signs and symptoms: headache back of head radiating down neck    History (predisposing factors):  none    Precipitating or alleviating factors: None    Therapies tried and outcome:  rest and fluids acetaminophen OTC NSAID Nyquil, [place], Excedrin    Started with sore throat and headache. No fevers or cough. Feels fatigued and achy. Has a 2.5 year old that has frequent illnesses, but no specific exposure. Taking advil 600-800 mg every 6 hours, acetaminophen, nyquil and unable to sleep last night due to the pain. No rashes.     Reviewed and updated as needed this visit by clinical staff       Reviewed and updated as needed this visit by Provider         -------------------------------------    Problem list and histories reviewed & adjusted, as indicated.  Additional history: as documented    ROS:  Constitutional, HEENT, cardiovascular, pulmonary, gi and gu systems are negative, except as otherwise noted.    Problem list, Medication list, Allergies, and Medical/Social/Surgical histories reviewed in EPIC and updated as appropriate.    OBJECTIVE:                                                    BP 92/56 (BP Location: Right arm, Patient Position: Chair, Cuff Size: Adult Regular)  Pulse 76  Temp 97.4  F (36.3  C) (Tympanic)  Ht 5' 6.5\" (1.689 m)  Wt 167 lb 8 oz (76 kg)  SpO2 98%  BMI 26.63 kg/m2   Body mass index is 26.63 kg/(m^2).  General Appearance: tired appearing, alert and no distress  Eyes:   no discharge, erythema.  Normal pupils.  Both Ears: normal: no effusions, no erythema, normal landmarks  Nose: no discharge and normal mucosa  Oropharynx: moderate " erythema, some white exudate, no ulcerations  Neck: Supple.  Bilateral mild cervical adenopathy, no asymmetry, masses  Respiratory: lungs clear to auscultation - no rales, rhonchi or wheezes.  Cardiovascular: regular rate and rhythm, normal S1 S2, no S3 or S4 and no murmur, click or rub.  No peripheral edema.  Skin: no rashes or lesions.  Well perfused and normal turgor.    Diagnostic Test Results:  Results for orders placed or performed in visit on 06/02/17 (from the past 24 hour(s))   Strep, Rapid Screen   Result Value Ref Range    Specimen Description Throat     Rapid Strep A Screen       NEGATIVE: No Group A streptococcal antigen detected by immunoassay, await   culture report.      Micro Report Status FINAL 06/02/2017         ASSESSMENT/PLAN:                                                      (J02.9) Acute pharyngitis, unspecified etiology  (primary encounter diagnosis)  Comment: likely viral. Discussed possibility of HFM with  exposures  Plan: Strep, Rapid Screen, Beta strep group A         culture, MAGIC MOUTHWASH, ENTER INGREDIENTS IN         COMMENTS,  - will call if strep culture turns positive  - discussed ibuprofen 600 mg q6 or 800 mg q8   - will try magic mouthwash as keeping from sleep    Follow up with Provider - if not improving     [facility]

## 2017-06-02 NOTE — MR AVS SNAPSHOT
"              After Visit Summary   6/2/2017    Yvonne Diez    MRN: [id number]           Patient Information     Date Of Birth          1980        Visit Information        Provider Department      6/2/2017 2:40 PM Emily [facility]        Today's Diagnoses     Acute pharyngitis, unspecified etiology    -  1      Care Instructions    1. Strep negative  2. Will call if culture turns positive  3. Magic mouthwash to help with pain          Follow-ups after your visit        Who to contact     If you have questions or need follow up information about today's clinic visit or your schedule please contact [facility] directly at [phone].  Normal or non-critical lab and imaging results will be communicated to you by MyChart, letter or phone within 4 business days after the clinic has received the results. If you do not hear from us within 7 days, please contact the clinic through [id number] or phone. If you have a critical or abnormal lab result, we will notify you by phone as soon as possible.  Submit refill requests through [employer] or call your pharmacy and they will forward the refill request to us. Please allow 3 business days for your refill to be completed.          Additional Information About Your Visit        MyChart Information     [employer] gives you secure access to your electronic health record. If you see a primary care provider, you can also send messages to your care team and make appointments. If you have questions, please call your primary care clinic.  If you do not have a primary care provider, please call [phone] and they will assist you.        Care EveryWhere ID     This is your Care EveryWhere ID. This could be used by other organizations to access your [facility] medical records  [id number]        Your Vitals Were     Pulse Temperature Height Pulse Oximetry BMI (Body Mass Index)       76 97.4  F (36.3  C) (Tympanic) 5' 6.5\" (1.689 m) 98% " 26.63 kg/m2        Blood Pressure from Last 3 Encounters:   06/02/17 92/56   04/26/17 137/79   04/24/17 123/81    Weight from Last 3 Encounters:   06/02/17 167 lb 8 oz (76 kg)   01/19/17 176 lb (79.8 kg)   07/04/16 174 lb (78.9 kg)              We Performed the Following     Beta strep group A culture     Strep, Rapid Screen          Today's Medication Changes          These changes are accurate as of: 6/2/17  3:10 PM.  If you have any questions, ask your nurse or doctor.               Start taking these medicines.        Dose/Directions    MAGIC MOUTHWASH (ENTER INGREDIENTS IN COMMENTS)   Used for:  Acute pharyngitis, unspecified etiology   Started by:  Emily Singleton MD        Dose:  5-10 mL   Swish and spit 5-10 mLs in mouth every 6 hours as needed For sore throat   Quantity:  120 mL   Refills:  1            Where to get your medicines      Some of these will need a paper prescription and others can be bought over the counter.  Ask your nurse if you have questions.     Bring a paper prescription for each of these medications     MAGIC MOUTHWASH (ENTER INGREDIENTS IN COMMENTS)                Primary Care Provider Office Phone # Fax #    Herbert Silva [phone]       [facility] [address] [address]        Thank you!     Thank you for choosing [facility]  for your care. Our goal is always to provide you with excellent care. Hearing back from our patients is one way we can continue to improve our services. Please take a few minutes to complete the written survey that you may receive in the mail after your visit with us. Thank you!             Your Updated Medication List - Protect others around you: Learn how to safely use, store and throw away your medicines at www.disposemymeds.org.          This list is accurate as of: 6/2/17  3:10 PM.  Always use your most recent med list.                   Brand Name Dispense Instructions for use    ADVIL 200 MG  capsule   Generic drug:  ibuprofen      Take 200 mg by mouth every 4 hours as needed. 2 tablets as needed for pain       CALCIUM 1000 + D PO      Take  by mouth daily.       celeXA 40 MG tablet   Generic drug:  citalopram      Take 40 mg by mouth daily       clonazePAM 1 MG tablet    klonoPIN     prn       HYDROcodone-acetaminophen 5-325 MG per tablet    NORCO     Take 1 tablet by mouth 1 tab DAILY prn       MAGIC MOUTHWASH (ENTER INGREDIENTS IN COMMENTS)     120 mL    Swish and spit 5-10 mLs in mouth every 6 hours as needed For sore throat       MULTI MAX PO      Take  by mouth daily.       WAL-ZACKERY MAXIMUM STRENGTH 50 MG Caps   Generic drug:  DiphenhydrAMINE HCl (Sleep)      Take 1 capsule by mouth At Bedtime.

## 2017-06-02 NOTE — NURSING NOTE
"Chief Complaint   Patient presents with     URI       Initial BP 92/56 (BP Location: Right arm, Patient Position: Chair, Cuff Size: Adult Regular)  Pulse 76  Temp 97.4  F (36.3  C) (Tympanic)  Ht 5' 6.5\" (1.689 m)  Wt 167 lb 8 oz (76 kg)  SpO2 98%  BMI 26.63 kg/m2 Estimated body mass index is 26.63 kg/(m^2) as calculated from the following:    Height as of this encounter: 5' 6.5\" (1.689 m).    Weight as of this encounter: 167 lb 8 oz (76 kg).  Medication Reconciliation: complete   Felicia Novoa LPN      "

## 2017-06-03 LAB
BACTERIA SPEC CULT: NORMAL
MICRO REPORT STATUS: NORMAL
SPECIMEN SOURCE: NORMAL

## 2017-12-26 ENCOUNTER — OFFICE VISIT (OUTPATIENT)
Dept: URGENT CARE | Facility: URGENT CARE | Age: 37
End: 2017-12-26
Payer: COMMERCIAL

## 2017-12-26 VITALS
BODY MASS INDEX: 29.03 KG/M2 | OXYGEN SATURATION: 100 % | WEIGHT: 182.6 LBS | SYSTOLIC BLOOD PRESSURE: 122 MMHG | HEART RATE: 68 BPM | DIASTOLIC BLOOD PRESSURE: 84 MMHG | TEMPERATURE: 98 F

## 2017-12-26 DIAGNOSIS — R07.0 THROAT PAIN: ICD-10-CM

## 2017-12-26 DIAGNOSIS — J06.9 VIRAL URI WITH COUGH: Primary | ICD-10-CM

## 2017-12-26 LAB
DEPRECATED S PYO AG THROAT QL EIA: NORMAL
SPECIMEN SOURCE: NORMAL

## 2017-12-26 PROCEDURE — 87880 STREP A ASSAY W/OPTIC: CPT | Performed by: FAMILY MEDICINE

## 2017-12-26 PROCEDURE — 87081 CULTURE SCREEN ONLY: CPT | Performed by: FAMILY MEDICINE

## 2017-12-26 PROCEDURE — 99213 OFFICE O/P EST LOW 20 MIN: CPT | Performed by: FAMILY MEDICINE

## 2017-12-26 RX ORDER — CODEINE PHOSPHATE AND GUAIFENESIN 10; 100 MG/5ML; MG/5ML
2 SOLUTION ORAL EVERY 4 HOURS PRN
Qty: 120 ML | Refills: 0 | Status: SHIPPED | OUTPATIENT
Start: 2017-12-26 | End: 2019-04-19

## 2017-12-26 RX ORDER — FLUTICASONE PROPIONATE 50 MCG
1-2 SPRAY, SUSPENSION (ML) NASAL DAILY
Qty: 1 BOTTLE | Refills: 0 | Status: SHIPPED | OUTPATIENT
Start: 2017-12-26 | End: 2019-04-19

## 2017-12-26 NOTE — PATIENT INSTRUCTIONS
Okay to take ibuprofen 200 mg - 4 tablets (800 mg) every 8 hours as needed.  Okay to take tylenol 500 mg - 2 tablets (1000 mg) every 6-8 hours as needed, do not exceed 3000 mg in 24 hours.  Okay to take sudafed 60 mg every 6 hours as needed.  Okay to use flonase to help with congestion.  Okay to take robitussin with codeine at bedtime to help with cough.      Viral Upper Respiratory Illness (Adult)  You have a viral upper respiratory illness (URI), which is another term for the common cold. This illness is contagious during the first few days. It is spread through the air by coughing and sneezing. It may also be spread by direct contact (touching the sick person and then touching your own eyes, nose, or mouth). Frequent handwashing will decrease risk of spread. Most viral illnesses go away within 7 to 10 days with rest and simple home remedies. Sometimes the illness may last for several weeks. Antibiotics will not kill a virus, and they are generally not prescribed for this condition.    Home care    If symptoms are severe, rest at home for the first 2 to 3 days. When you resume activity, don't let yourself get too tired.    Avoid being exposed to cigarette smoke (yours or others ).    You may use acetaminophen or ibuprofen to control pain and fever, unless another medicine was prescribed. (Note: If you have chronic liver or kidney disease, have ever had a stomach ulcer or gastrointestinal bleeding, or are taking blood-thinning medicines, talk with your healthcare provider before using these medicines.) Aspirin should never be given to anyone under 18 years of age who is ill with a viral infection or fever. It may cause severe liver or brain damage.    Your appetite may be poor, so a light diet is fine. Avoid dehydration by drinking 6 to 8 glasses of fluids per day (water, soft drinks, juices, tea, or soup). Extra fluids will help loosen secretions in the nose and lungs.    Over-the-counter cold medicines will not  shorten the length of time you re sick, but they may be helpful for the following symptoms: cough, sore throat, and nasal and sinus congestion. (Note: Do not use decongestants if you have high blood pressure.)  Follow-up care  Follow up with your healthcare provider, or as advised.  When to seek medical advice  Call your healthcare provider right away if any of these occur:    Cough with lots of colored sputum (mucus)    Severe headache; face, neck, or ear pain    Difficulty swallowing due to throat pain    Fever of 100.4 F (38 C)  Call 911, or get immediate medical care  Call emergency services right away if any of these occur:    Chest pain, shortness of breath, wheezing, or difficulty breathing    Coughing up blood    Inability to swallow due to throat pain  Date Last Reviewed: 9/13/2015 2000-2017 [employer]. [address] [place] [address], [place] [address]. All rights reserved. This information is not intended as a substitute for professional medical care. Always follow your healthcare professional's instructions.

## 2017-12-26 NOTE — PROGRESS NOTES
Pt presents in clinic today with ST, bilateral ear pain, cough, HA and chills x 1 week. Day-quil has not been effective for the past 24 hours.  Celina ABARCA, ASTRID,AAMA    SUBJECTIVE:   Yvonne Diez is a 37 year old female presenting with a chief complaint of sore throat, ear pain, cough, headache and chills.  Had mono when teenager.  Cough is dry.  Yellowed rhinitis.  Onset of symptoms was 1 week(s) ago.  Course of illness is worsening.    Severity moderate  Current and Associated symptoms: rhinitis, headache  Treatment measures tried include dayquil, nyquil, debbie-selter, salt water gargle, theraflu.  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    Past Medical History:   Diagnosis Date     Endometriosis      Current Outpatient Prescriptions   Medication Sig Dispense Refill     MAGIC MOUTHWASH, ENTER INGREDIENTS IN COMMENTS, Swish and spit 5-10 mLs in mouth every 6 hours as needed For sore throat 120 mL 1     clonazePAM (KLONOPIN) 1 MG tablet prn       HYDROcodone-acetaminophen (NORCO) 5-325 MG per tablet Take 1 tablet by mouth 1 tab DAILY prn       citalopram (CELEXA) 40 MG tablet Take 40 mg by mouth daily       DiphenhydrAMINE HCl, Sleep, (WAL-ZACKERY MAXIMUM STRENGTH) 50 MG CAPS Take 1 capsule by mouth At Bedtime.       Multiple Vitamins-Minerals (MULTI MAX PO) Take  by mouth daily.       Calcium Carb-Cholecalciferol (CALCIUM 1000 + D PO) Take  by mouth daily.       ibuprofen (ADVIL) 200 MG capsule Take 200 mg by mouth every 4 hours as needed. 2 tablets as needed for pain       Social History   Substance Use Topics     Smoking status: Former Smoker     Years: 5.00     Types: Cigarettes     Quit date: 12/1/2011     Smokeless tobacco: Never Used      Comment: Smoked 1 pack/week     Alcohol use 4.8 oz/week     8 Standard drinks or equivalent per week       ROS:  CONSTITUTIONAL:POSITIVE  for chills, fatigue and malaise  INTEGUMENTARY/SKIN: NEGATIVE for worrisome rashes, moles or  lesions  ENT/MOUTH: POSITIVE for ear pain, nasal congestion, rhinorrhea-purulent and sore throat  RESP:POSITIVE for cough-non productive  CV: NEGATIVE for chest pain, palpitations or peripheral edema  GI: NEGATIVE for nausea, abdominal pain, heartburn, or change in bowel habits    OBJECTIVE:  /84  Pulse 68  Temp 98  F (36.7  C) (Oral)  Wt 182 lb 9.6 oz (82.8 kg)  SpO2 100%  BMI 29.03 kg/m2  GENERAL APPEARANCE: healthy, alert and no distress  EYES: EOMI,  PERRL, conjunctiva clear  HENT: ear canals and TM's normal.  Nose and mouth without ulcers, erythema or lesions.  Pharynx pink, no exudates.  No sinus tenderness on percussion.  NECK: supple, nontender, no lymphadenopathy  RESP: lungs clear to auscultation - no rales, rhonchi or wheezes  CV: regular rates and rhythm, normal S1 S2, no murmur noted  NEURO: Normal strength and tone, sensory exam grossly normal,  normal speech and mentation  SKIN: no suspicious lesions or rashes  PSYCH: mentation appears normal and affect normal/bright    Results for orders placed or performed in visit on 12/26/17   Strep, Rapid Screen   Result Value Ref Range    Specimen Description Throat     Rapid Strep A Screen       NEGATIVE: No Group A streptococcal antigen detected by immunoassay, await culture report.       ASSESSMENT/PLAN:  (J06.9,  B97.89) Viral URI with cough  (primary encounter diagnosis)  Plan: fluticasone (FLONASE) 50 MCG/ACT spray,         guaiFENesin-codeine (ROBITUSSIN AC) 100-10         MG/5ML SOLN solution            (R07.0) Throat pain  Comment: viral  Plan: Strep, Rapid Screen, Beta strep group A culture            Reassurance given, reviewed symptomatic treatment, plenty of fluids and rest.  RX flonase to help with sinus congestion, encourage to take sudafed to help with eustachian tube dysfunction and congestion.  Reviewed that if sinus symptoms progresses for over 10 days and has pain with palpation in sinus areas that antibiotic would be more  effective at that time.  RX ene AC given to help with cough, best at bedtime.  Will follow up on throat culture and treat if positive for strep.    Return to clinic if no resolution of symptoms.    Manny Smalls MD  December 26, 2017 11:16 AM

## 2017-12-26 NOTE — MR AVS SNAPSHOT
After Visit Summary   12/26/2017    Yvonne Diez    MRN: [id number]           Patient Information     Date Of Birth          1980        Visit Information        Provider Department      12/26/2017 9:30 AM Manny Smalls MD [facility]        Today's Diagnoses     Viral URI with cough    -  1    Throat pain          Care Instructions    Okay to take ibuprofen 200 mg - 4 tablets (800 mg) every 8 hours as needed.  Okay to take tylenol 500 mg - 2 tablets (1000 mg) every 6-8 hours as needed, do not exceed 3000 mg in 24 hours.  Okay to take sudafed 60 mg every 6 hours as needed.  Okay to use flonase to help with congestion.  Okay to take robitussin with codeine at bedtime to help with cough.      Viral Upper Respiratory Illness (Adult)  You have a viral upper respiratory illness (URI), which is another term for the common cold. This illness is contagious during the first few days. It is spread through the air by coughing and sneezing. It may also be spread by direct contact (touching the sick person and then touching your own eyes, nose, or mouth). Frequent handwashing will decrease risk of spread. Most viral illnesses go away within 7 to 10 days with rest and simple home remedies. Sometimes the illness may last for several weeks. Antibiotics will not kill a virus, and they are generally not prescribed for this condition.    Home care    If symptoms are severe, rest at home for the first 2 to 3 days. When you resume activity, don't let yourself get too tired.    Avoid being exposed to cigarette smoke (yours or others ).    You may use acetaminophen or ibuprofen to control pain and fever, unless another medicine was prescribed. (Note: If you have chronic liver or kidney disease, have ever had a stomach ulcer or gastrointestinal bleeding, or are taking blood-thinning medicines, talk with your healthcare provider before using these medicines.) Aspirin should never be given to anyone  under 18 years of age who is ill with a viral infection or fever. It may cause severe liver or brain damage.    Your appetite may be poor, so a light diet is fine. Avoid dehydration by drinking 6 to 8 glasses of fluids per day (water, soft drinks, juices, tea, or soup). Extra fluids will help loosen secretions in the nose and lungs.    Over-the-counter cold medicines will not shorten the length of time you re sick, but they may be helpful for the following symptoms: cough, sore throat, and nasal and sinus congestion. (Note: Do not use decongestants if you have high blood pressure.)  Follow-up care  Follow up with your healthcare provider, or as advised.  When to seek medical advice  Call your healthcare provider right away if any of these occur:    Cough with lots of colored sputum (mucus)    Severe headache; face, neck, or ear pain    Difficulty swallowing due to throat pain    Fever of 100.4 F (38 C)  Call 911, or get immediate medical care  Call emergency services right away if any of these occur:    Chest pain, shortness of breath, wheezing, or difficulty breathing    Coughing up blood    Inability to swallow due to throat pain  Date Last Reviewed: 9/13/2015 2000-2017 [employer] [address] [place] [address]. All rights reserved. This information is not intended as a substitute for professional medical care. Always follow your healthcare professional's instructions.                Follow-ups after your visit        Who to contact     If you have questions or need follow up information about today's clinic visit or your schedule please contact [facility] directly at [phone].  Normal or non-critical lab and imaging results will be communicated to you by MyChart, letter or phone within 4 business days after the clinic has received the results. If you do not hear from us within 7 days, please contact the clinic through MyChart or phone. If you have a critical or  abnormal lab result, we will notify you by phone as soon as possible.  Submit refill requests through [employer] or call your pharmacy and they will forward the refill request to us. Please allow 3 business days for your refill to be completed.          Additional Information About Your Visit        [employer] Information     [employer] gives you secure access to your electronic health record. If you see a primary care provider, you can also send messages to your care team and make appointments. If you have questions, please call your primary care clinic.  If you do not have a primary care provider, please call [phone] and they will assist you.        Care EveryWhere ID     This is your Care EveryWhere ID. This could be used by other organizations to access your [facility] medical records  [id number]        Your Vitals Were     Pulse Temperature Pulse Oximetry BMI (Body Mass Index)          68 98  F (36.7  C) (Oral) 100% 29.03 kg/m2         Blood Pressure from Last 3 Encounters:   12/26/17 122/84   06/02/17 92/56   04/26/17 137/79    Weight from Last 3 Encounters:   12/26/17 182 lb 9.6 oz (82.8 kg)   06/02/17 167 lb 8 oz (76 kg)   01/19/17 176 lb (79.8 kg)              We Performed the Following     Beta strep group A culture     Strep, Rapid Screen          Today's Medication Changes          These changes are accurate as of: 12/26/17 11:11 AM.  If you have any questions, ask your nurse or doctor.               Start taking these medicines.        Dose/Directions    fluticasone 50 MCG/ACT spray   Commonly known as:  FLONASE   Used for:  Viral URI with cough   Started by:  Manny Smalls MD        Dose:  1-2 spray   Spray 1-2 sprays into both nostrils daily   Quantity:  1 Bottle   Refills:  0       guaiFENesin-codeine 100-10 MG/5ML Soln solution   Commonly known as:  ROBITUSSIN AC   Used for:  Viral URI with cough   Started by:  Manny Smalls MD        Dose:  2 tsp.   Take 10 mLs by mouth every 4 hours as needed    Quantity:  120 mL   Refills:  0            Where to get your medicines      These medications were sent to [id number] - [place] - [address] [facility]  [address] [place] [address]     Phone:  [phone]     fluticasone 50 MCG/ACT spray         Some of these will need a paper prescription and others can be bought over the counter.  Ask your nurse if you have questions.     Bring a paper prescription for each of these medications     guaiFENesin-codeine 100-10 MG/5ML Soln solution                Primary Care Provider Office Phone # Fax #    Herbert Silva [phone]       [facility] [place] [address]  [place] [facility] [address]        Equal Access to Services     TARIK FERRELL : Hadii letty ku hadasho Soomaali, waaxda luqadaha, qaybta kaalmada adeegyada, waxjose luis alonzoin hayrudyn terrell bernstein . So [facility] [phone].    ATENCIÓN: Si habla español, tiene a rankin disposición servicios gratuitos de asistencia lingüística. [facility] [phone].    We comply with applicable federal civil rights laws and [place] laws. We do not discriminate on the basis of race, color, national origin, age, disability, sex, sexual orientation, or gender identity.            Thank you!     Thank you for choosing [facility]  for your care. Our goal is always to provide you with excellent care. Hearing back from our patients is one way we can continue to improve our services. Please take a few minutes to complete the written survey that you may receive in the mail after your visit with us. Thank you!             Your Updated Medication List - Protect others around you: Learn how to safely use, store and throw away your medicines at www.disposemymeds.org.          This list is accurate as of: 12/26/17 11:11 AM.  Always use your most recent med list.                   Brand Name Dispense Instructions for use Diagnosis    ADVIL 200 MG  capsule   Generic drug:  ibuprofen      Take 200 mg by mouth every 4 hours as needed. 2 tablets as needed for pain        CALCIUM 1000 + D PO      Take  by mouth daily.        celeXA 40 MG tablet   Generic drug:  citalopram      Take 40 mg by mouth daily        clonazePAM 1 MG tablet    klonoPIN     prn        fluticasone 50 MCG/ACT spray    FLONASE    1 Bottle    Spray 1-2 sprays into both nostrils daily    Viral URI with cough       guaiFENesin-codeine 100-10 MG/5ML Soln solution    ROBITUSSIN AC    120 mL    Take 10 mLs by mouth every 4 hours as needed    Viral URI with cough       HYDROcodone-acetaminophen 5-325 MG per tablet    NORCO     Take 1 tablet by mouth 1 tab DAILY prn        MAGIC MOUTHWASH (ENTER INGREDIENTS IN COMMENTS)     120 mL    Swish and spit 5-10 mLs in mouth every 6 hours as needed For sore throat    Acute pharyngitis, unspecified etiology       MULTI MAX PO      Take  by mouth daily.        WAL-ZACKERY MAXIMUM STRENGTH 50 MG Caps   Generic drug:  DiphenhydrAMINE HCl (Sleep)      Take 1 capsule by mouth At Bedtime.

## 2017-12-26 NOTE — NURSING NOTE
"Chief Complaint   Patient presents with     URI       Initial /84  Pulse 68  Temp 98  F (36.7  C) (Oral)  Wt 182 lb 9.6 oz (82.8 kg)  SpO2 100%  BMI 29.03 kg/m2 Estimated body mass index is 29.03 kg/(m^2) as calculated from the following:    Height as of 6/2/17: 5' 6.5\" (1.689 m).    Weight as of this encounter: 182 lb 9.6 oz (82.8 kg).  Medication Reconciliation: complete   Celina ABARCA, ASTRID,AAMA      "

## 2017-12-27 LAB
BACTERIA SPEC CULT: NORMAL
SPECIMEN SOURCE: NORMAL

## 2019-04-19 ENCOUNTER — APPOINTMENT (OUTPATIENT)
Dept: ULTRASOUND IMAGING | Facility: CLINIC | Age: 39
End: 2019-04-19
Attending: PHYSICIAN ASSISTANT
Payer: COMMERCIAL

## 2019-04-19 ENCOUNTER — HOSPITAL ENCOUNTER (EMERGENCY)
Facility: CLINIC | Age: 39
Discharge: HOME | End: 2019-04-19
Admitting: PHYSICIAN ASSISTANT
Payer: COMMERCIAL

## 2019-04-19 VITALS
HEART RATE: 81 BPM | OXYGEN SATURATION: 97 % | DIASTOLIC BLOOD PRESSURE: 83 MMHG | RESPIRATION RATE: 16 BRPM | SYSTOLIC BLOOD PRESSURE: 128 MMHG | TEMPERATURE: 97.9 F

## 2019-04-19 DIAGNOSIS — I80.02 THROMBOPHLEBITIS OF SUPERFICIAL VEINS OF LEFT LOWER EXTREMITY: ICD-10-CM

## 2019-04-19 PROCEDURE — 99284 EMERGENCY DEPT VISIT MOD MDM: CPT | Mod: 25

## 2019-04-19 PROCEDURE — 93971 EXTREMITY STUDY: CPT | Mod: LT

## 2019-04-19 ASSESSMENT — ENCOUNTER SYMPTOMS
SHORTNESS OF BREATH: 0
FEVER: 0
COLOR CHANGE: 1
MYALGIAS: 1

## 2019-04-19 NOTE — ED PROVIDER NOTES
History     Chief Complaint:  Leg Pain    The history is provided by the patient.      Yvonne Diez is a 38 year old female with a history of endometriosis and insomnia who presents to the emergency department for evaluation of leg pain. Of note, the patient had a  on 19. During this pregnancy, she experienced varicose veins. Yesterday, the patient reports the sudden onset of redness and pain in her upper left leg that is tender to the touch. She says that his pain is the exact same pain as her varicose veins, but this one is red and warm. She has had no IVs in her legs. Her fear of a DVT and slightly abnormal pain prompted the patient to seek evaluation in the emergency room today.  She denies any fevers, chest pain or shortness of breath.     Allergies:  Sulfa Drugs    Medications:    Celexa   Klonopin  Diphenhydramine     Past Medical History:    Endometriosis   Anxiety   Depression   Insomnia     Past Surgical History:    ENT surgery   Diagnostic laparoscopy, abdomen, peritoneum & omentum     Family History:    Lupus   Jessica Gehrig's disease     Social History:  The patient was accompanied to the ED by herself.  Smoking Status: Former  Smokeless Tobacco: Never  Alcohol Use: Yes  Marital Status:       Review of Systems   Constitutional: Negative for fever.   Respiratory: Negative for shortness of breath.    Cardiovascular: Negative for chest pain.   Musculoskeletal: Positive for myalgias (Left thigh pain).   Skin: Positive for color change.   All other systems reviewed and are negative.    Physical Exam     Patient Vitals for the past 24 hrs:   BP Temp Temp src Pulse Resp SpO2   19 1608 128/83 97.9  F (36.6  C) Oral 81 16 97 %     Physical Exam  General: Alert and cooperative with exam. Resting comfortably on gurney  Head:  Scalp is NC/AT  Eyes:  No scleral icterus, PERRL  ENT:  The external nose and ears are normal.  CV:  Regular rate and rhythm    No pathologic murmur, rubs, or  gallops.  Resp:  Breath sounds are clear bilaterally.  No crackles, wheezes, rhonchi.    Non-labored, no retractions or accessory muscle use  GI:  Abdomen is soft, no distension, no tenderness, no masses. No peritoneal signs.  Bowel sounds present in all quadrants  MS:  No edema or swelling of calf or leg. Tenderness to palpation with palpable cord on the medial thigh. Small amount of overlying erythema.  No fluctuance or crepitance.  Skin:  Warm and dry, No rash or lesions noted.  Neuro: Oriented. No gross motor deficits.  Psych:  Awake. Alert. Normal affect. Appropriate interactions.    Emergency Department Course   Imaging:  US Lower Extremity Venous Duplex, Left, limited:  1. No evidence of deep venous thrombosis within the left lower extremity.  2. Thrombosed superficial vein in the left medial knee in the area of redness. As per radiology.     Emergency Department Course:  Nursing notes and vitals reviewed. 1625 I performed an exam of the patient as documented above.     IV inserted. Medicine administered as documented above. Blood drawn. This was sent to the lab for further testing, results above.    The patient was sent for a Lower Extremity Venous Duplex US while in the emergency department, findings above.     1808 I rechecked the patient and discussed the results of her workup thus far.     Findings and plan explained to the Patient. Patient discharged home with instructions regarding supportive care, medications, and reasons to return. The importance of close follow-up was reviewed.     I personally answered all related questions prior to discharge.     Impression & Plan    Medical Decision Making:  Yvonne Diez is a 38 year old female who presents for evaluation of a tender lump on the left leg.  This is consistent by ultrasound with a superficial thrombophlebitis likely secondary to increased estrogen from recent pregnancy.  I do not think at this point it represents an infected thrombophlebitis.   Discussed warm compresses and NSAID therapy and that patient can continue breastfeeding.   Ultrasound is reassuring as there is no DVT.  No signs of pulmonary embolism by history or physical exam.  See primary this week for further reassessment.      Diagnosis:    ICD-10-CM    1. Thrombophlebitis of superficial veins of left lower extremity I80.02        Disposition:  discharged to home    Scribe Disclosure:  I, Lizette Lee, am serving as a scribe on 4/19/2019 at 4:18 PM to personally document services performed by Kyle Wadsworth PA-C, based on my observations and the provider's statements to me.     Lizette Lee  4/19/2019   [facility] EMERGENCY DEPARTMENT       Kyle Wadsworth PA-C  04/19/19 1710

## 2019-10-22 ENCOUNTER — ALLIED HEALTH/NURSE VISIT (OUTPATIENT)
Dept: NURSING | Facility: CLINIC | Age: 39
End: 2019-10-22
Payer: COMMERCIAL

## 2019-10-22 VITALS — TEMPERATURE: 97.1 F

## 2019-10-22 DIAGNOSIS — Z23 NEED FOR PROPHYLACTIC VACCINATION AND INOCULATION AGAINST INFLUENZA: Primary | ICD-10-CM

## 2019-10-22 PROCEDURE — 90686 IIV4 VACC NO PRSV 0.5 ML IM: CPT

## 2019-10-22 PROCEDURE — 90471 IMMUNIZATION ADMIN: CPT

## 2020-03-02 ENCOUNTER — HEALTH MAINTENANCE LETTER (OUTPATIENT)
Age: 40
End: 2020-03-02

## 2020-05-12 ENCOUNTER — APPOINTMENT (OUTPATIENT)
Dept: URGENT CARE | Facility: URGENT CARE | Age: 40
End: 2020-05-12
Payer: COMMERCIAL

## 2020-05-12 ENCOUNTER — RESULTS ONLY (OUTPATIENT)
Dept: LAB | Age: 40
End: 2020-05-12

## 2020-05-13 LAB
SARS-COV-2 RNA SPEC QL NAA+PROBE: NOT DETECTED
SPECIMEN SOURCE: NORMAL

## 2020-05-16 ENCOUNTER — OFFICE VISIT (OUTPATIENT)
Dept: URGENT CARE | Facility: URGENT CARE | Age: 40
End: 2020-05-16
Payer: COMMERCIAL

## 2020-05-16 ENCOUNTER — RESULTS ONLY (OUTPATIENT)
Dept: LAB | Age: 40
End: 2020-05-16

## 2020-05-16 DIAGNOSIS — Z20.822 SUSPECTED COVID-19 VIRUS INFECTION: Primary | ICD-10-CM

## 2020-05-16 PROCEDURE — 99000 SPECIMEN HANDLING OFFICE-LAB: CPT

## 2020-05-16 PROCEDURE — 99207 ZZC NO BILLABLE SERVICE THIS VISIT: CPT

## 2020-05-16 PROCEDURE — U0003 INFECTIOUS AGENT DETECTION BY NUCLEIC ACID (DNA OR RNA); SEVERE ACUTE RESPIRATORY SYNDROME CORONAVIRUS 2 (SARS-COV-2) (CORONAVIRUS DISEASE [COVID-19]), AMPLIFIED PROBE TECHNIQUE, MAKING USE OF HIGH THROUGHPUT TECHNOLOGIES AS DESCRIBED BY CMS-2020-01-R: HCPCS | Mod: 90

## 2020-05-17 LAB
SARS-COV-2 RNA SPEC QL NAA+PROBE: NOT DETECTED
SPECIMEN SOURCE: NORMAL

## 2020-11-20 ENCOUNTER — TELEPHONE (OUTPATIENT)
Dept: PEDIATRICS | Facility: CLINIC | Age: 40
End: 2020-11-20

## 2020-11-20 ENCOUNTER — OFFICE VISIT (OUTPATIENT)
Dept: URGENT CARE | Facility: URGENT CARE | Age: 40
End: 2020-11-20
Payer: COMMERCIAL

## 2020-11-20 ENCOUNTER — HOSPITAL ENCOUNTER (OUTPATIENT)
Dept: ULTRASOUND IMAGING | Facility: CLINIC | Age: 40
Discharge: HOME | End: 2020-11-20
Attending: NURSE PRACTITIONER | Admitting: NURSE PRACTITIONER
Payer: COMMERCIAL

## 2020-11-20 ENCOUNTER — OFFICE VISIT (OUTPATIENT)
Dept: PEDIATRICS | Facility: CLINIC | Age: 40
End: 2020-11-20
Payer: COMMERCIAL

## 2020-11-20 VITALS
WEIGHT: 197 LBS | DIASTOLIC BLOOD PRESSURE: 80 MMHG | RESPIRATION RATE: 18 BRPM | BODY MASS INDEX: 31.32 KG/M2 | SYSTOLIC BLOOD PRESSURE: 117 MMHG | TEMPERATURE: 98 F | HEART RATE: 75 BPM | OXYGEN SATURATION: 100 %

## 2020-11-20 VITALS
TEMPERATURE: 98.9 F | DIASTOLIC BLOOD PRESSURE: 80 MMHG | HEART RATE: 86 BPM | BODY MASS INDEX: 31.16 KG/M2 | WEIGHT: 196 LBS | SYSTOLIC BLOOD PRESSURE: 119 MMHG | OXYGEN SATURATION: 97 %

## 2020-11-20 DIAGNOSIS — I83.93 VARICOSE VEINS OF BOTH LOWER EXTREMITIES, UNSPECIFIED WHETHER COMPLICATED: Primary | ICD-10-CM

## 2020-11-20 DIAGNOSIS — M79.604 BILATERAL LEG PAIN: Primary | ICD-10-CM

## 2020-11-20 DIAGNOSIS — M79.605 BILATERAL LEG PAIN: Primary | ICD-10-CM

## 2020-11-20 DIAGNOSIS — M79.604 BILATERAL LEG PAIN: ICD-10-CM

## 2020-11-20 DIAGNOSIS — M79.605 BILATERAL LEG PAIN: ICD-10-CM

## 2020-11-20 PROCEDURE — 99215 OFFICE O/P EST HI 40 MIN: CPT | Performed by: NURSE PRACTITIONER

## 2020-11-20 PROCEDURE — 99207 PR FIRST ORDER ACUTE REFERRAL: CPT | Performed by: FAMILY MEDICINE

## 2020-11-20 PROCEDURE — 93970 EXTREMITY STUDY: CPT

## 2020-11-20 RX ORDER — BUPROPION HYDROCHLORIDE 200 MG/1
200 TABLET, EXTENDED RELEASE ORAL
COMMUNITY
Start: 2020-11-12 | End: 2023-07-10

## 2020-11-20 RX ORDER — NORETHINDRONE ACETATE AND ETHINYL ESTRADIOL .02; 1 MG/1; MG/1
1 TABLET ORAL
COMMUNITY
Start: 2020-10-02 | End: 2023-07-10

## 2020-11-20 ASSESSMENT — ENCOUNTER SYMPTOMS
CONSTITUTIONAL NEGATIVE: 1
HEMATOLOGIC/LYMPHATIC NEGATIVE: 1
NEUROLOGICAL NEGATIVE: 1
RESPIRATORY NEGATIVE: 1
PALPITATIONS: 0
GASTROINTESTINAL NEGATIVE: 1

## 2020-11-20 NOTE — PROGRESS NOTES
SUBJECTIVE:   Yvonne Diez is a 40 year old female presenting with a chief complaint of   Chief Complaint   Patient presents with     Leg Pain     bilateral lower extremity pain X 2 days     40 year old female presents urgently from [facility] to the [facility] for evaluation of bilateral leg pain.  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 Has history of superficial thrombophlebitis of the left leg in 2019 after the birth of a child. No history of DVT's, PE's or blood disorders. Does not smoke. She just got back from a long car ride yesterday. Was in the car for 7 hours. Does have varicose veins.   No swelling or redness to the lower legs. Dose have pain with walking.   No chest pain or SOB.     Review of Systems   Constitutional: Negative.    Respiratory: Negative.    Cardiovascular: Negative for chest pain and palpitations.        Bilateral leg pain, varicose veins, hx of superficial thrombophlebitis, left calf pain.    Gastrointestinal: Negative.    Musculoskeletal:        Legs hurt with walking   Neurological: Negative.    Hematological: Negative.        Past Medical History:   Diagnosis Date     Endometriosis      Family History   Problem Relation Age of Onset     Endocrine Disease Mother         Lupus     Prostate Cancer Maternal Grandfather      Cancer Maternal Grandmother         lung     Musculoskeletal Disorder Paternal Uncle         Jessica geerigs disease     Current Outpatient Medications   Medication Sig Dispense Refill     buPROPion (WELLBUTRIN SR) 200 MG 12 hr tablet Take 200 mg by mouth       Calcium Carb-Cholecalciferol (CALCIUM 1000 + D PO) Take  by mouth daily.       citalopram (CELEXA) 40 MG tablet Take 40 mg by mouth daily       clonazePAM (KLONOPIN) 1 MG tablet prn       HYDROcodone-acetaminophen (NORCO) 5-325 MG per tablet Take 1 tablet by mouth 1 tab DAILY prn       ibuprofen (ADVIL) 200 MG capsule Take 200 mg by mouth every 4 hours as needed. 2 tablets as needed for  pain       Multiple Vitamins-Minerals (MULTI MAX PO) Take  by mouth daily.       norethindrone-ethinyl estradiol (MICROGESTIN ) 1-20 MG-MCG tablet Take 1 tablet by mouth       Social History     Tobacco Use     Smoking status: Former Smoker     Years: 5.00     Types: Cigarettes     Quit date: 2011     Years since quittin.9     Smokeless tobacco: Never Used     Tobacco comment: Smoked 1 pack/week   Substance Use Topics     Alcohol use: Yes     Alcohol/week: 8.0 standard drinks     Types: 8 Standard drinks or equivalent per week     Comment: 3 x week       OBJECTIVE  /80 (BP Location: Right arm, Patient Position: Chair, Cuff Size: Adult Large)   Pulse 75   Temp 98  F (36.7  C) (Oral)   Resp 18   Wt 89.4 kg (197 lb)   LMP 2020 (Within Days)   SpO2 100%   BMI 31.32 kg/m      Physical Exam  Constitutional:       General: She is not in acute distress.     Appearance: Normal appearance.   HENT:      Head: Normocephalic and atraumatic.   Eyes:      Conjunctiva/sclera: Conjunctivae normal.   Cardiovascular:      Rate and Rhythm: Normal rate and regular rhythm.      Pulses: Normal pulses.      Heart sounds: Normal heart sounds.   Pulmonary:      Effort: Pulmonary effort is normal. No respiratory distress.      Breath sounds: Normal breath sounds.   Musculoskeletal:         General: Tenderness present. No swelling or signs of injury.      Right lower leg: No edema.      Left lower leg: No edema.      Comments: Tenderness left calf. No erythema, warmth.   Negative homens sign bilaterally   Skin:     General: Skin is warm and dry.   Neurological:      General: No focal deficit present.      Mental Status: She is alert and oriented to person, place, and time.   Psychiatric:         Mood and Affect: Mood normal.         Thought Content: Thought content normal.       Results for orders placed or performed in visit on 20    Lower Extremity Venous Duplex Bilateral     Status: None    Narrative     US LOWER EXTREMITY VENOUS DUPLEX BILATERAL 11/20/2020 11:16 AM    CLINICAL HISTORY: hx superficial thrombophlebitis, new to birth  control. bilateral leg pain; Bilateral leg pain; Bilateral leg pain  TECHNIQUE: Venous Duplex ultrasound of bilateral lower extremities  with and without compression, augmentation and duplex. Color flow and  spectral Doppler with waveform analysis performed.    COMPARISON: None.    FINDINGS: Exam includes the common femoral, femoral, popliteal veins  as well as segmentally visualized deep calf veins and greater  saphenous vein.     RIGHT: No deep vein thrombosis. No superficial thrombophlebitis. No  popliteal cyst.    LEFT: No deep vein thrombosis. No superficial thrombophlebitis. No  popliteal cyst.      Impression    IMPRESSION:  1.  No deep venous thrombosis in the bilateral lower extremities.    MD Yvonne MUSTAFA was seen today for leg pain.    Diagnoses and all orders for this visit:    Varicose veins of both lower extremities, unspecified whether complicated  -     VASCULAR SURGERY REFERRAL; Future    Bilateral leg pain  -     Referral to Acute and Diagnostic Services (Day of diagnostic / First order acute)  -     Cancel: US Lower Extremity Venous Duplex Left  -     Cancel: US Lower Extremity Venous Duplex Right  -     Cancel: US Lower Ext Venous Duplex Limited Bilat  -     US Lower Extremity Venous Duplex Bilateral  -     VASCULAR SURGERY REFERRAL; Future    US negative which is reassuring. Most likely is her varicose veins. Vascular surgery referral given to evaluate for treatment of varicose veins. Recommend support socks daily  May use tylenol or ibuprofen for pain if needed.

## 2020-11-20 NOTE — TELEPHONE ENCOUNTER
Received call from pt with concerns of possible blood clots in legs    Pt's sx  -pain behind both knees  -feels like her veins hurt when laying down and standing up  -calves sore    Not swollen or red    Hx of superficial blood clot after pregnancy in 2019    Pt is not an established pt of [facility]    Advised pt be seen in the UC today for evaluation    Thank you  Francisco Alba RN on 11/20/2020 at 8:36 AM

## 2020-11-20 NOTE — PROGRESS NOTES
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  She would like to rule out clots in the legs.  She is presenting with a chief complaint of pain behind both knees.  Patient also complains of sore calves.  .  Yesterday patient awoke with severe pain at the medial knees and medial calves.    Two days ago, patient was in a long car ride.  She has been on a birth control pill containing estrogen for the past month.      No recent surgeries within the past month  No shortness of breath  No chest pain  No increased swelling at the legs  No recent history of cancer within the past six months.      Past Medical History:   Diagnosis Date     Endometriosis    Thrombophlebitis of the superficial veins of the medial left knee in 2019    Current Outpatient Medications   Medication Sig Dispense Refill     buPROPion (WELLBUTRIN SR) 200 MG 12 hr tablet Take 200 mg by mouth       Calcium Carb-Cholecalciferol (CALCIUM 1000 + D PO) Take  by mouth daily.       citalopram (CELEXA) 40 MG tablet Take 40 mg by mouth daily       clonazePAM (KLONOPIN) 1 MG tablet prn       ibuprofen (ADVIL) 200 MG capsule Take 200 mg by mouth every 4 hours as needed. 2 tablets as needed for pain       Multiple Vitamins-Minerals (MULTI MAX PO) Take  by mouth daily.       norethindrone-ethinyl estradiol (MICROGESTIN 1/20) 1-20 MG-MCG tablet Take 1 tablet by mouth       HYDROcodone-acetaminophen (NORCO) 5-325 MG per tablet Take 1 tablet by mouth 1 tab DAILY prn       Social History     Tobacco Use     Smoking status: Former Smoker     Years: 5.00     Types: Cigarettes     Quit date: 2011     Years since quittin.9     Smokeless tobacco: Never Used     Tobacco comment: Smoked 1 pack/week   Substance Use Topics     Alcohol use: Yes     Alcohol/week: 8.0 standard drinks     Types: 8 Standard drinks or equivalent per week        ROS:  CONSTITUTIONAL:NEGATIVE  for fevers.   MUSCULOSKELETAL: POSITIVE  for bilateral medial knee and medial calf pain.     OBJECTIVE:  /80   Pulse 86   Temp 98.9  F (37.2  C) (Tympanic)   Wt 88.9 kg (196 lb)   SpO2 97%   Breastfeeding No   BMI 31.16 kg/m    GENERAL APPEARANCE: healthy, alert and no distress  Extremities: No significant edema/erythema/cyanosis at the legs.  There is pain with palpation over the medial bilateral proximal thighs and proximal knees and proximal calves.      ASSESSMENT:  Bilateral leg pain.  Rule out DVT or superficial venous thrombosis.      PLAN:  Patient will go to the [facility].   Transfer to Acute and Diagnostic Services  I have contacted the staff at the [facility] at [phone] to confirm patient acceptance.  Special Needs: None    Discussed transition to Acute & Diagnostic Services Clinic, and patient agrees with next level of care.  Patient transportation will be provided by the patient.          Donavon De Guzman MD

## 2020-11-20 NOTE — PATIENT INSTRUCTIONS
Your test was negative for clots or superficial phlebitis.  Most likely the pain is from your varicose veins. Recommend support socks and follow up with vascular surgery for evaluation of varicose veins.

## 2020-11-20 NOTE — TELEPHONE ENCOUNTER
Symptoms    Describe your symptoms: Pain by knees, Hx of blood clots    Any pain: Yes: both calves    How long have you been having symptoms: 1  days    Have you been seen for this:  Yes: unknown    Appointment offered?: No    Triage offered?: Yes: red flag    Home remedies tried: advil    Requested Pharmacy: n/a    Okay to leave a detailed message? Yes at Cell number on file:    Telephone Information:   Mobile [phone]

## 2020-11-25 DIAGNOSIS — I83.893 SYMPTOMATIC VARICOSE VEINS OF BOTH LOWER EXTREMITIES: Primary | ICD-10-CM

## 2020-12-14 ENCOUNTER — HEALTH MAINTENANCE LETTER (OUTPATIENT)
Age: 40
End: 2020-12-14

## 2020-12-22 NOTE — TELEPHONE ENCOUNTER
DIAGNOSIS: varicose vein   DATE RECEIVED: 1.4.21   NOTES STATUS DETAILS   OFFICE NOTE from referring provider Internal 11.20.20  Taisha Person CNP  [facility]   OFFICE NOTE from other specialist Internal 11.20.20  Dr. Donavon De Guzman  [facility] [place]   OPERATIVE REPORT N/A    MEDICATION LIST Internal    PERTINENT LABS Internal    CTA (CT ANGIOGRAPHY) N/A    CT N/A    MRI N/A    ULTRASOUND Internal *sched* for 12.28.20  US Venous Comp Onofre    11.20.20  US Low Ext Venous Duplex Onofre    4.19.19  US Low Ext Venous Duplex Left

## 2020-12-28 ENCOUNTER — ANCILLARY PROCEDURE (OUTPATIENT)
Dept: ULTRASOUND IMAGING | Facility: CLINIC | Age: 40
End: 2020-12-28
Attending: RADIOLOGY
Payer: COMMERCIAL

## 2020-12-28 DIAGNOSIS — I83.893 SYMPTOMATIC VARICOSE VEINS OF BOTH LOWER EXTREMITIES: ICD-10-CM

## 2020-12-28 PROCEDURE — 93970 EXTREMITY STUDY: CPT | Mod: GC | Performed by: RADIOLOGY

## 2021-01-04 ENCOUNTER — VIRTUAL VISIT (OUTPATIENT)
Dept: VASCULAR SURGERY | Facility: CLINIC | Age: 41
End: 2021-01-04
Payer: COMMERCIAL

## 2021-01-04 ENCOUNTER — PRE VISIT (OUTPATIENT)
Dept: VASCULAR SURGERY | Facility: CLINIC | Age: 41
End: 2021-01-04

## 2021-01-04 DIAGNOSIS — I83.893 SYMPTOMATIC VARICOSE VEINS OF BOTH LOWER EXTREMITIES: Primary | ICD-10-CM

## 2021-01-04 PROCEDURE — 99204 OFFICE O/P NEW MOD 45 MIN: CPT | Mod: 95 | Performed by: RADIOLOGY

## 2021-01-04 RX ORDER — ACETAMINOPHEN 500 MG
1000 TABLET ORAL EVERY 6 HOURS PRN
COMMUNITY

## 2021-01-04 ASSESSMENT — PAIN SCALES - GENERAL: PAINLEVEL: NO PAIN (0)

## 2021-01-04 NOTE — PATIENT INSTRUCTIONS
Krishan you have had your virtual consult today with Dr West IR/Vascular to discuss your venous insufficiency.  Your ultrasound completed on 12/28/20 results have been discussed with you during this visit.    Plan:    We will mail you a prescription for both Knee and Thigh compression stockings medical grade.  Please fill these prescriptions at a durable medical equipment supply company.      Wear the compression as needed for symptom control.  You do not need to wear them at night or when you have your legs elevated.    Further follow up with Dr West will be as you need, should you become symptomatic, please don't hesitate to contact us.    Please let us know if you have any further questions or concerns regarding to days visit.    SUZIE Mcdonald RN, BSN  Interventional Radiology Nurse Coordinator   Phone:  [phone]

## 2021-01-04 NOTE — NURSING NOTE
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     Debbi Hill LPN

## 2021-01-04 NOTE — PROGRESS NOTES
Yvonne acosta is a 40 year old female who is being evaluated via a billable video visit.      How would you like to obtain your AVS? [employer]hart  If the video visit is dropped, the invitation should be resent by: Other e-mail: my chart connect  Will anyone else be joining your video visit? No      Video Start Time: 0920    Video-Visit Details    Type of service:  Video Visit    Video End Time:1005    Originating Location (pt. Location): Home    Distant Location (provider location):  [facility] [place]     Platform used for Video Visit: [facility]        INTERVENTIONAL RADIOLOGY CONSULTATION    Name: Yvonne Diez  Age: 40 year old   Referring Physician: Dr. Person  REASON FOR REFERRAL: Lower extremity varicosities, symptomatic    HPI: Krishan is a very pleasant 40-year-old female who is here for evaluation of lower extremity varicosities.  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  The pain progresses as she stands on her feet for long hours, however it is minimal and not disruptive of her day.  There is somewhat alleviation with elevation of her feet.  She has used compression stockings with good response.  She has had history of superficial thrombophlebitis of the left leg in 2019 after a childbirth.  She notices bulging varicosities in both legs, however symptoms are not disruptive of her lifestyle.  She is mostly concerned about possibility of developing DVT and PE.  She denies having any history of DVT, PE or hypercoagulable states.  She does not smoke.  Recent visit in November 2020 for bilateral calf pain resulted in a DVT study that did not show any deep vein thrombosis in either lower extremities.  She is fairly active and walks on a daily basis.  She does not have any skin change, wounds, or discoloration on her medial ankles.    PAST MEDICAL HISTORY:   Past Medical History:   Diagnosis Date     Endometriosis        PAST SURGICAL  HISTORY:   Past Surgical History:   Procedure Laterality Date     BLOOD PATCH N/A 2017    Procedure: EPIDURAL BLOOD PATCH;  EPIDURAL BLOOD PATCH;  Surgeon: GENERIC ANESTHESIA PROVIDER;  Location: [facility] OR     [facility] LAPAROSCOPY, SURGICAL, ABDOMEN, PERITONEUM & OMENTUM; DX W/ OR W/O SPECIMEN(S)  2006    Laparoscopy, diagnostic     RW GENERAL SURG (ABSTRACTED)      wisdom  teeth       FAMILY HISTORY:   Family History   Problem Relation Age of Onset     Endocrine Disease Mother         Lupus     Prostate Cancer Maternal Grandfather      Cancer Maternal Grandmother         lung     Musculoskeletal Disorder Paternal Uncle         Jessica geerigs disease       SOCIAL HISTORY:   Social History     Tobacco Use     Smoking status: Former Smoker     Years: 5.00     Types: Cigarettes     Quit date: 2011     Years since quittin.1     Smokeless tobacco: Never Used     Tobacco comment: Smoked 1 pack/week   Substance Use Topics     Alcohol use: Yes     Alcohol/week: 8.0 standard drinks     Types: 8 Standard drinks or equivalent per week     Comment: 3 x week       PROBLEM LIST:   Patient Active Problem List    Diagnosis Date Noted     Insomnia 2012     Priority: Medium     Depression 2012     Priority: Medium     Generalized anxiety disorder 2012     Priority: Medium     Diagnosis updated by automated process. Provider to review and confirm.         MEDICATIONS:   Prescription Medications as of 2021       Rx Number Disp Refills Start End Last Dispensed Date Next Fill Date Owning Pharmacy    acetaminophen (TYLENOL) 325 MG tablet        [id number] [address] [address] AT [place] [address] & [facility]    Sig: Take 325-650 mg by mouth every 6 hours as needed for mild pain    Class: Historical    Route: Oral    buPROPion (WELLBUTRIN SR) 200 MG 12 hr tablet    2020        Sig: Take 200 mg by mouth    Class: Historical    Route: Oral    Calcium Carb-Cholecalciferol  (CALCIUM 1000 + D PO)            Sig: Take  by mouth daily.    Class: Historical    Route: Oral    citalopram (CELEXA) 40 MG tablet            Sig: Take 40 mg by mouth daily    Class: Historical    Route: Oral    clonazePAM (KLONOPIN) 1 MG tablet    1/11/2017        Sig: prn    Class: Historical    Route: Oral    COMPRESSION STOCKINGS  2 each 4 1/5/2021        Sig: Please measure and distribute 1 pair of 20mmHg - 30mmHg knee high open or closed toe compression stockings. Jobst ultrasheer or equivalent.    Class: Local Print    COMPRESSION STOCKINGS  2 each 4 1/5/2021        Sig: Please measure and distribute 1 pair of 20mmHg - 30mmHg thigh high open or closed toe compression stockings. Jobst ultrasheer or equivalent.    Class: Local Print    HYDROcodone-acetaminophen (NORCO) 5-325 MG per tablet    1/11/2017        Sig: Take 1 tablet by mouth 1 tab DAILY prn    Class: Historical    Earliest Fill Date: 1/11/2017    Route: Oral    ibuprofen (ADVIL) 200 MG capsule            Sig: Take 200 mg by mouth every 4 hours as needed. 2 tablets as needed for pain    Class: Historical    Route: Oral    Multiple Vitamins-Minerals (MULTI MAX PO)            Sig: Take  by mouth daily.    Class: Historical    Route: Oral    norethindrone-ethinyl estradiol (MICROGESTIN 1/20) 1-20 MG-MCG tablet    10/2/2020    [facility] #[id number] - [facility] [address] AT [place] [address] & [facility]    Sig: Take 1 tablet by mouth    Class: Historical    Route: Oral          ALLERGIES:   Sulfa drugs    ROS:  An 11 point review of system was performed and pertinent negative and positives are mentioned in HPI.      Physical Examination:   VITALS:   There were no vitals taken for this visit.  General:  Pt sitting in chair comfortably.  No acute distress  HEENT: normocephalic.    Neck: no JVD  Resp: nonlabored.   Vasc: No edema in either lower ext.  No skin changes, hemosiderosis or ulcerations.    Neuro: Oriented x3.  No evidence  of focal motor deficits  Mood: appropriate affect      Labs:    BMP RESULTS:  Lab Results   Component Value Date     04/24/2017    POTASSIUM 3.0 (L) 04/24/2017    CHLORIDE 105 04/24/2017    CO2 26 04/24/2017    ANIONGAP 7 04/24/2017     (H) 04/24/2017    BUN 7 04/24/2017    CR 0.72 04/24/2017    GFRESTIMATED >90  Non  GFR Calc   04/24/2017    GFRESTBLACK >90   GFR Calc   04/24/2017    TI 8.6 04/24/2017        CBC RESULTS:  Lab Results   Component Value Date    WBC 7.0 04/22/2017    RBC 3.91 04/22/2017    HGB 12.6 04/22/2017    HCT 36.6 04/22/2017    MCV 94 04/22/2017    MCH 32.2 04/22/2017    MCHC 34.4 04/22/2017    RDW 12.2 04/22/2017     04/22/2017       INR/PTT:  No results found for: INR, PTT    Diagnostic studies: Venous competency study dated 12/28/2020 was reviewed great saphenous vein incompetence bilaterally.  There are bilateral incompetent varicosities in bilateral GSV tributaries.  1. Right lower extremity:  1a. No deep vein or superficial vein thrombosis.   1b. Common femoral vein is incompetent with Valsalva. Remaining deep  veins are competent.  1c. Incompetent great saphenous vein from the saphenofemoral junction  through mid thigh.   1d. Incompetent varicosities associated with a great saphenous vein as  detailed above. No incompetent perforators.     2. Left lower extremity:  2a. No deep vein or superficial vein thrombosis. .  2b. Common femoral vein is incompetent with Valsalva. Remaining deep  veins are competent.  2c. Incompetent great saphenous vein from the saphenofemoral junction  through mid calf. The short saphenous vein is incompetent at the mid  calf.   2d. Incompetent varicosities associated with the great saphenous vein  as detailed above. No incompetent perforators.    Assessment 39 yo F with bilateral incompetence of GSV and associated incompetent enlarged varicosities with symptoms that are congruent with imaging findings and a hx  of SVT. Her main concern is possibility of developing DVT from these which we discussed.  She does not believe her symptoms are life style limiting. Given the risk/benefit ratio she elected to continue using medical grade compression stockings.  Plan   - Knee high compression stockings during waking/walking hours  - RTC PRN.    Thank you for allowing me to participate in the care of your patient.    I, Dr Peter West, was present for the entirety of this telemedicine visit. I have documented the findings as well as the assessment and plan.      Peter West MD    Vascular and Interventional Radiology  [facility]  Patient Care Team:  No Ref-Primary, Physician as PCP - General

## 2021-01-04 NOTE — LETTER
Date:March 20, 2021      Patient was self referred, no letter generated. Do not send.        [facility] Health Information

## 2021-01-04 NOTE — LETTER
1/4/2021       RE: Yvonne Diez  [address] [address] [address]     Dear Colleague,    Thank you for referring your patient, Yvonne Diez, to the [facility] [place] at [facility]. Please see a copy of my visit note below.    Yvonne acosta is a 40 year old female who is being evaluated via a billable video visit.      How would you like to obtain your AVS? MyChart  If the video visit is dropped, the invitation should be resent by: Other e-mail: my chart connect  Will anyone else be joining your video visit? No      Video Start Time: 0920    Video-Visit Details    Type of service:  Video Visit    Video End Time:1005    Originating Location (pt. Location): Home    Distant Location (provider location):  [facility]     Platform used for Video Visit: [employer]        INTERVENTIONAL RADIOLOGY CONSULTATION    Name: Yvonne Diez  Age: 40 year old   Referring Physician: Dr. Person  REASON FOR REFERRAL: Lower extremity varicosities, symptomatic    HPI: Krishan is a very pleasant 40-year-old female who is here for evaluation of lower extremity varicosities.  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  The pain progresses as she stands on her feet for long hours, however it is minimal and not disruptive of her day.  There is somewhat alleviation with elevation of her feet.  She has used compression stockings with good response.  She has had history of superficial thrombophlebitis of the left leg in 2019 after a childbirth.  She notices bulging varicosities in both legs, however symptoms are not disruptive of her lifestyle.  She is mostly concerned about possibility of developing DVT and PE.  She denies having any history of DVT, PE or hypercoagulable states.  She does not smoke.  Recent visit in November 2020 for bilateral calf pain resulted in a DVT study that  did not show any deep vein thrombosis in either lower extremities.  She is fairly active and walks on a daily basis.  She does not have any skin change, wounds, or discoloration on her medial ankles.    PAST MEDICAL HISTORY:   Past Medical History:   Diagnosis Date     Endometriosis        PAST SURGICAL HISTORY:   Past Surgical History:   Procedure Laterality Date     BLOOD PATCH N/A 2017    Procedure: EPIDURAL BLOOD PATCH;  EPIDURAL BLOOD PATCH;  Surgeon: GENERIC ANESTHESIA PROVIDER;  Location: [facility] OR     [facility] LAPAROSCOPY, SURGICAL, ABDOMEN, PERITONEUM & OMENTUM; DX W/ OR W/O SPECIMEN(S)  2006    Laparoscopy, diagnostic     [facility] GENERAL SURG (ABSTRACTED)      wisdom  teeth       FAMILY HISTORY:   Family History   Problem Relation Age of Onset     Endocrine Disease Mother         Lupus     Prostate Cancer Maternal Grandfather      Cancer Maternal Grandmother         lung     Musculoskeletal Disorder Paternal Uncle         Jessica geerigs disease       SOCIAL HISTORY:   Social History     Tobacco Use     Smoking status: Former Smoker     Years: 5.00     Types: Cigarettes     Quit date: 2011     Years since quittin.1     Smokeless tobacco: Never Used     Tobacco comment: Smoked 1 pack/week   Substance Use Topics     Alcohol use: Yes     Alcohol/week: 8.0 standard drinks     Types: 8 Standard drinks or equivalent per week     Comment: 3 x week       PROBLEM LIST:   Patient Active Problem List    Diagnosis Date Noted     Insomnia 2012     Priority: Medium     Depression 2012     Priority: Medium     Generalized anxiety disorder 2012     Priority: Medium     Diagnosis updated by automated process. Provider to review and confirm.         MEDICATIONS:   Prescription Medications as of 2021       Rx Number Disp Refills Start End Last Dispensed Date Next Fill Date Owning Pharmacy    acetaminophen (TYLENOL) 325 MG tablet        [facility] #[id number] [place], [place] - [address] AT  [place] [address] & [facility]    Sig: Take 325-650 mg by mouth every 6 hours as needed for mild pain    Class: Historical    Route: Oral    buPROPion (WELLBUTRIN SR) 200 MG 12 hr tablet    11/12/2020        Sig: Take 200 mg by mouth    Class: Historical    Route: Oral    Calcium Carb-Cholecalciferol (CALCIUM 1000 + D PO)            Sig: Take  by mouth daily.    Class: Historical    Route: Oral    citalopram (CELEXA) 40 MG tablet            Sig: Take 40 mg by mouth daily    Class: Historical    Route: Oral    clonazePAM (KLONOPIN) 1 MG tablet    1/11/2017        Sig: prn    Class: Historical    Route: Oral    COMPRESSION STOCKINGS  2 each 4 1/5/2021        Sig: Please measure and distribute 1 pair of 20mmHg - 30mmHg knee high open or closed toe compression stockings. Jobst ultrasheer or equivalent.    Class: Local Print    COMPRESSION STOCKINGS  2 each 4 1/5/2021        Sig: Please measure and distribute 1 pair of 20mmHg - 30mmHg thigh high open or closed toe compression stockings. Jobst ultrasheer or equivalent.    Class: Local Print    HYDROcodone-acetaminophen (NORCO) 5-325 MG per tablet    1/11/2017        Sig: Take 1 tablet by mouth 1 tab DAILY prn    Class: Historical    Earliest Fill Date: 1/11/2017    Route: Oral    ibuprofen (ADVIL) 200 MG capsule            Sig: Take 200 mg by mouth every 4 hours as needed. 2 tablets as needed for pain    Class: Historical    Route: Oral    Multiple Vitamins-Minerals (MULTI MAX PO)            Sig: Take  by mouth daily.    Class: Historical    Route: Oral    norethindrone-ethinyl estradiol (MICROGESTIN 1/20) 1-20 MG-MCG tablet    10/2/2020    [facility] #[id number] - [facility] [address] [address] AT [place] [address] & [facility]    Sig: Take 1 tablet by mouth    Class: Historical    Route: Oral          ALLERGIES:   Sulfa drugs    ROS:  An 11 point review of system was performed and pertinent negative and positives are mentioned in HPI.      Physical  Examination:   VITALS:   There were no vitals taken for this visit.  General:  Pt sitting in chair comfortably.  No acute distress  HEENT: normocephalic.    Neck: no JVD  Resp: nonlabored.   Vasc: No edema in either lower ext.  No skin changes, hemosiderosis or ulcerations.    Neuro: Oriented x3.  No evidence of focal motor deficits  Mood: appropriate affect      Labs:    BMP RESULTS:  Lab Results   Component Value Date     04/24/2017    POTASSIUM 3.0 (L) 04/24/2017    CHLORIDE 105 04/24/2017    CO2 26 04/24/2017    ANIONGAP 7 04/24/2017     (H) 04/24/2017    BUN 7 04/24/2017    CR 0.72 04/24/2017    GFRESTIMATED >90  Non  GFR Calc   04/24/2017    GFRESTBLACK >90   GFR Calc   04/24/2017    TI 8.6 04/24/2017        CBC RESULTS:  Lab Results   Component Value Date    WBC 7.0 04/22/2017    RBC 3.91 04/22/2017    HGB 12.6 04/22/2017    HCT 36.6 04/22/2017    MCV 94 04/22/2017    MCH 32.2 04/22/2017    MCHC 34.4 04/22/2017    RDW 12.2 04/22/2017     04/22/2017       INR/PTT:  No results found for: INR, PTT    Diagnostic studies: Venous competency study dated 12/28/2020 was reviewed great saphenous vein incompetence bilaterally.  There are bilateral incompetent varicosities in bilateral GSV tributaries.  1. Right lower extremity:  1a. No deep vein or superficial vein thrombosis.   1b. Common femoral vein is incompetent with Valsalva. Remaining deep  veins are competent.  1c. Incompetent great saphenous vein from the saphenofemoral junction  through mid thigh.   1d. Incompetent varicosities associated with a great saphenous vein as  detailed above. No incompetent perforators.     2. Left lower extremity:  2a. No deep vein or superficial vein thrombosis. .  2b. Common femoral vein is incompetent with Valsalva. Remaining deep  veins are competent.  2c. Incompetent great saphenous vein from the saphenofemoral junction  through mid calf. The short saphenous vein is  incompetent at the mid  calf.   2d. Incompetent varicosities associated with the great saphenous vein  as detailed above. No incompetent perforators.    Assessment 41 yo F with bilateral incompetence of GSV and associated incompetent enlarged varicosities with symptoms that are congruent with imaging findings and a hx of SVT. Her main concern is possibility of developing DVT from these which we discussed.  She does not believe her symptoms are life style limiting. Given the risk/benefit ratio she elected to continue using medical grade compression stockings.  Plan   - Knee high compression stockings during waking/walking hours  - RTC PRN.    Thank you for allowing me to participate in the care of your patient.    I, Dr Peter West, was present for the entirety of this telemedicine visit. I have documented the findings as well as the assessment and plan.      Peter West MD    Vascular and Interventional Radiology  [facility]  Patient Care Team:  No Ref-Primary, Physician as PCP - General            Again, thank you for allowing me to participate in the care of your patient.      Sincerely,    Peter West MD

## 2021-01-05 DIAGNOSIS — I87.2 VENOUS (PERIPHERAL) INSUFFICIENCY: Primary | ICD-10-CM

## 2021-01-05 DIAGNOSIS — I83.893 SYMPTOMATIC VARICOSE VEINS OF BOTH LOWER EXTREMITIES: ICD-10-CM

## 2021-04-18 ENCOUNTER — HEALTH MAINTENANCE LETTER (OUTPATIENT)
Age: 41
End: 2021-04-18

## 2021-04-30 ENCOUNTER — OFFICE VISIT (OUTPATIENT)
Dept: URGENT CARE | Facility: URGENT CARE | Age: 41
End: 2021-04-30
Payer: COMMERCIAL

## 2021-04-30 ENCOUNTER — ANCILLARY PROCEDURE (OUTPATIENT)
Dept: GENERAL RADIOLOGY | Facility: CLINIC | Age: 41
End: 2021-04-30
Attending: PHYSICIAN ASSISTANT
Payer: COMMERCIAL

## 2021-04-30 VITALS
HEART RATE: 78 BPM | OXYGEN SATURATION: 98 % | DIASTOLIC BLOOD PRESSURE: 88 MMHG | SYSTOLIC BLOOD PRESSURE: 140 MMHG | TEMPERATURE: 98 F | RESPIRATION RATE: 20 BRPM

## 2021-04-30 DIAGNOSIS — M89.8X1 PAIN OF RIGHT CLAVICLE: Primary | ICD-10-CM

## 2021-04-30 DIAGNOSIS — M89.8X1 PAIN OF RIGHT CLAVICLE: ICD-10-CM

## 2021-04-30 PROCEDURE — 73000 X-RAY EXAM OF COLLAR BONE: CPT | Mod: RT | Performed by: RADIOLOGY

## 2021-04-30 PROCEDURE — 99214 OFFICE O/P EST MOD 30 MIN: CPT | Performed by: PHYSICIAN ASSISTANT

## 2021-04-30 RX ORDER — DICLOFENAC SODIUM 75 MG/1
75 TABLET, DELAYED RELEASE ORAL 2 TIMES DAILY
Qty: 20 TABLET | Refills: 0 | Status: SHIPPED | OUTPATIENT
Start: 2021-04-30 | End: 2021-05-26

## 2021-04-30 NOTE — PATIENT INSTRUCTIONS
Clavicle XR looks OK and normal  Encouraged ice, rest and sling. Do arm circles daily.   Will switch from ibuprofen to diclofenac for the next week. If not improving in one week, would like follow-up with orthopedics / sports med.   If signs of GI bleed or ulcer occur, discontinue diclofenac medication

## 2021-04-30 NOTE — LETTER
[facility] [place]  [address]  [address]  [place] [address]  Phone: [phone]  Fax: [phone]    April 30, 2021        Yvonne Diez  [address] [address] [address]          To whom it may concern:    RE: Yvonne Diez    Patient was seen and treated today at our clinic. Please excuse from work 5/2/2021 - 5/3/2021.     Please contact me for questions or concerns.      Sincerely,        India Vo PA-C
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
## 2021-05-25 ENCOUNTER — TELEPHONE (OUTPATIENT)
Dept: PEDIATRICS | Facility: CLINIC | Age: 41
End: 2021-05-25

## 2021-05-25 NOTE — TELEPHONE ENCOUNTER
"Patient has scheduled/rescheduled multiple times with Jessie Landry PA-C regarding \"work comp, back pain.\"  This writer discussed with original , as patient has not been seen at [facility], and seeiking clarification.  Central scheduler states that patient stated differently that it was not for back pain, but to establish care (original appointment notes were copied/forwarded to each appointment).  Left message for patient to seek clarification regarding upcoming appointment.  Jessie does not establish care.  Rescheduled with Dr. Fischer for same time tomorrow.  Provided direct number to call back and discuss appointment.  Upon return call, please transfer to station D [phone].     Arpita Jade       "

## 2021-05-25 NOTE — TELEPHONE ENCOUNTER
Patient returned call. Patient states she is scheduled to establish care with Dr. Harper, but is having back pain that she would like addressed sooner.  Patient is also requesting a later appointment time due to .  Patient is rescheduled with Jessie Landry PA-C 05/26/21 at 10:50 am.    Arpita Jade

## 2021-05-26 ENCOUNTER — OFFICE VISIT (OUTPATIENT)
Dept: PEDIATRICS | Facility: CLINIC | Age: 41
End: 2021-05-26
Payer: COMMERCIAL

## 2021-05-26 VITALS
WEIGHT: 191.7 LBS | HEART RATE: 76 BPM | TEMPERATURE: 98.5 F | OXYGEN SATURATION: 100 % | BODY MASS INDEX: 30.48 KG/M2 | DIASTOLIC BLOOD PRESSURE: 74 MMHG | SYSTOLIC BLOOD PRESSURE: 106 MMHG

## 2021-05-26 DIAGNOSIS — M54.16 LUMBAR RADICULOPATHY: Primary | ICD-10-CM

## 2021-05-26 PROCEDURE — 99213 OFFICE O/P EST LOW 20 MIN: CPT | Performed by: PHYSICIAN ASSISTANT

## 2021-05-26 RX ORDER — ESCITALOPRAM OXALATE 20 MG/1
TABLET ORAL
COMMUNITY
Start: 2021-05-07 | End: 2023-05-12 | Stop reason: SINTOL

## 2021-05-26 RX ORDER — HYDROCODONE BITARTRATE AND ACETAMINOPHEN 5; 325 MG/1; MG/1
1 TABLET ORAL EVERY 6 HOURS PRN
Qty: 12 TABLET | Refills: 0 | Status: SHIPPED | OUTPATIENT
Start: 2021-05-26 | End: 2021-05-29

## 2021-05-26 RX ORDER — CYCLOBENZAPRINE HCL 10 MG
10 TABLET ORAL 3 TIMES DAILY PRN
Qty: 15 TABLET | Refills: 0 | Status: SHIPPED | OUTPATIENT
Start: 2021-05-26 | End: 2021-07-15

## 2021-05-26 RX ORDER — NAPROXEN 500 MG/1
500 TABLET ORAL 2 TIMES DAILY WITH MEALS
Qty: 60 TABLET | Refills: 0 | Status: SHIPPED | OUTPATIENT
Start: 2021-05-26 | End: 2022-04-25

## 2021-05-26 RX ORDER — TRAZODONE HYDROCHLORIDE 100 MG/1
100 TABLET ORAL AT BEDTIME
COMMUNITY

## 2021-05-26 ASSESSMENT — ANXIETY QUESTIONNAIRES
5. BEING SO RESTLESS THAT IT IS HARD TO SIT STILL: MORE THAN HALF THE DAYS
1. FEELING NERVOUS, ANXIOUS, OR ON EDGE: MORE THAN HALF THE DAYS
2. NOT BEING ABLE TO STOP OR CONTROL WORRYING: MORE THAN HALF THE DAYS
GAD7 TOTAL SCORE: 14
6. BECOMING EASILY ANNOYED OR IRRITABLE: MORE THAN HALF THE DAYS
3. WORRYING TOO MUCH ABOUT DIFFERENT THINGS: MORE THAN HALF THE DAYS
IF YOU CHECKED OFF ANY PROBLEMS ON THIS QUESTIONNAIRE, HOW DIFFICULT HAVE THESE PROBLEMS MADE IT FOR YOU TO DO YOUR WORK, TAKE CARE OF THINGS AT HOME, OR GET ALONG WITH OTHER PEOPLE: VERY DIFFICULT
7. FEELING AFRAID AS IF SOMETHING AWFUL MIGHT HAPPEN: MORE THAN HALF THE DAYS

## 2021-05-26 ASSESSMENT — PATIENT HEALTH QUESTIONNAIRE - PHQ9
5. POOR APPETITE OR OVEREATING: MORE THAN HALF THE DAYS
SUM OF ALL RESPONSES TO PHQ QUESTIONS 1-9: 13
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
## 2021-05-27 ASSESSMENT — ANXIETY QUESTIONNAIRES: GAD7 TOTAL SCORE: 14

## 2021-06-02 ENCOUNTER — TRANSFERRED RECORDS (OUTPATIENT)
Dept: MULTI SPECIALTY CLINIC | Facility: CLINIC | Age: 41
End: 2021-06-02
Payer: COMMERCIAL

## 2021-06-02 LAB
HPV ABSTRACT: NORMAL
PAP-ABSTRACT: NORMAL

## 2021-06-03 ENCOUNTER — TELEPHONE (OUTPATIENT)
Dept: PEDIATRICS | Facility: CLINIC | Age: 41
End: 2021-06-03

## 2021-06-03 NOTE — TELEPHONE ENCOUNTER
Patient needs a letter to return to work without restrictions. Farzana Porter, CMA on 6/3/2021 at 3:34 PM

## 2021-06-03 NOTE — LETTER
June 4, 2021      Yvonne Dize  [address] [address]        To Whom It May Concern:    Yvonne Diez may return to work without restrictions.         Sincerely,        Guilherme Ladnry PA-C

## 2021-06-03 NOTE — TELEPHONE ENCOUNTER
I gave patient a letter that day excusing her from work. Will that be sufficient? Does she need another note stating she may return to work without restrictions. Please clarify.    Guilherme Landry PA-C        Message       ----- Message -----   From: Yvonne Diez   Sent: 6/2/2021   2:43 PM CDT   To:  [employer] [facility]   Subject: Written Workability                                Topic: Procedural Question      Hi,      I was seen by Guilherme Landry last month concerning a pinched sciatic nerve. My employer is requesting a workability dated on the date that I was seen. Since seeing Guilherme, my symptoms have improved and I have been able to work.       So if you could be have her send a workability dated the date I was seen by her to Fax # [phone]. Attention Dannielle Ibarra RN, EOHS      Please let me know if you have any questions.       Thank you,   Yvonne Diez

## 2021-07-15 ENCOUNTER — OFFICE VISIT (OUTPATIENT)
Dept: PEDIATRICS | Facility: CLINIC | Age: 41
End: 2021-07-15
Payer: COMMERCIAL

## 2021-07-15 VITALS
SYSTOLIC BLOOD PRESSURE: 100 MMHG | WEIGHT: 184 LBS | HEART RATE: 83 BPM | RESPIRATION RATE: 16 BRPM | BODY MASS INDEX: 29.25 KG/M2 | OXYGEN SATURATION: 97 % | TEMPERATURE: 97.4 F | DIASTOLIC BLOOD PRESSURE: 60 MMHG

## 2021-07-15 DIAGNOSIS — R10.9 ABDOMINAL CRAMPING: Primary | ICD-10-CM

## 2021-07-15 DIAGNOSIS — F41.1 GENERALIZED ANXIETY DISORDER: ICD-10-CM

## 2021-07-15 DIAGNOSIS — F33.41 RECURRENT MAJOR DEPRESSIVE DISORDER, IN PARTIAL REMISSION (H): ICD-10-CM

## 2021-07-15 DIAGNOSIS — N80.9 ENDOMETRIOSIS: ICD-10-CM

## 2021-07-15 DIAGNOSIS — G47.00 INSOMNIA, UNSPECIFIED TYPE: ICD-10-CM

## 2021-07-15 PROCEDURE — 99204 OFFICE O/P NEW MOD 45 MIN: CPT | Performed by: INTERNAL MEDICINE

## 2021-07-15 RX ORDER — HYOSCYAMINE SULFATE 0.125 MG
0.12 TABLET ORAL EVERY 4 HOURS PRN
Qty: 90 TABLET | Refills: 1 | Status: SHIPPED | OUTPATIENT
Start: 2021-07-15 | End: 2021-08-10

## 2021-07-15 NOTE — PROGRESS NOTES
Assessment & Plan     Abdominal cramping  Symptoms seem likely consistent with IBS and endometriosis symptoms. Did have a TSH recently that was wnl. Discussed management options, see PI. Patient elects to try anti-spasmodic agent and fiber supplementation.   - hyoscyamine (LEVSIN) 0.125 MG tablet; Take 1 tablet (125 mcg) by mouth every 4 hours as needed for cramping    Endometriosis  Follows with OB for management, on OCPs.     Recurrent major depressive disorder, in partial remission (H)  Relatively stable, working closely with psychiatry for medication management.     Generalized anxiety disorder  Follows with psychiatry, has been able to taper on clonazepam.     Insomnia, unspecified type  On trazodone.       34 minutes spent on the date of the encounter doing chart review, history and exam, documentation and further activities per the note       Patient Instructions   It's possible that you have IBS. A couple of things that I would think about for managing this:  - adding fiber to your diet (both dietary and supplemental - Metamucil).  - antispasmodic agents (Levsin) for diarrhea, spasms  - low dose tricyclic antidepressant - would ask psychiatrist about this (usually 10-20mg of amitriptyline)   - peppermint oil  ([employer] Tummy Tamers)  - low FODMAP diet  - stool softeners for constipation  - trial of an elimination diet: dairy, soy, corn, gluten, eggs, peanuts      Return in about 6 months (around 1/15/2022).    Brandee Estrada MD  [facility] [place] is a 40 year old who presents for the following health issues     History of Present Illness       She eats 2-3 servings of fruits and vegetables daily.She consumes 0 sweetened beverage(s) daily.She exercises with enough effort to increase her heart rate 30 to 60 minutes per day.  She exercises with enough effort to increase her heart rate 3 or less days per week.   She is taking medications regularly.       New Patient/Transfer  of Care    Hx of endometriosis, s/p 2 surgeries. Seen at [facility] (Dr. Florentino), likely needs a third surgery. On birth control.     In process of switching psychiatrists, currently sees Dr. Biggs at [facility] but switching practices. Also sees therapist through them (since 2015 following significant post-partum anxiety). Currently taking bupropion, clonazepam 0.5mg prn, trazodone, and lexapro.     Has been experiencing more abdominal bloating and weight gain. Some mottling on her stomach, uses a heating pad regularly in this area. Mother with connective tissue disorder. Does have intermittent diarrhea and constipation, father with possible IBS. Some cramping, hard to tell what is due to IBS and what isn't.     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 Follows with ophthalmology.       Review of Systems   Constitutional, HEENT, cardiovascular, pulmonary, gi and gu systems are negative, except as otherwise noted.      Objective    /60   Pulse 83   Temp 97.4  F (36.3  C) (Tympanic)   Resp 16   Wt 83.5 kg (184 lb)   SpO2 97%   BMI 29.25 kg/m    Body mass index is 29.25 kg/m .  Physical Exam   GENERAL: healthy, alert and no distress  RESP: lungs clear to auscultation - no rales, rhonchi or wheezes  CV: regular rate and rhythm, normal S1 S2, no S3 or S4, no murmur, click or rub, no peripheral edema and peripheral pulses strong  ABDOMEN: soft, some lower abdominal tenderness but otherwise nontender, no hepatosplenomegaly, no masses and bowel sounds normal. Mottling of skin over abdomen.

## 2021-07-15 NOTE — PATIENT INSTRUCTIONS
It's possible that you have IBS. A couple of things that I would think about for managing this:  - adding fiber to your diet (both dietary and supplemental - Metamucil).  - antispasmodic agents (Levsin) for diarrhea, spasms  - low dose tricyclic antidepressant - would ask psychiatrist about this (usually 10-20mg of amitriptyline)   - peppermint oil  ([employer] Tummy Tamers)  - low FODMAP diet  - stool softeners for constipation  - trial of an elimination diet: dairy, soy, corn, gluten, eggs, peanuts

## 2021-08-08 DIAGNOSIS — R10.9 ABDOMINAL CRAMPING: ICD-10-CM

## 2021-08-09 ENCOUNTER — MYC MEDICAL ADVICE (OUTPATIENT)
Dept: PEDIATRICS | Facility: CLINIC | Age: 41
End: 2021-08-09

## 2021-08-09 DIAGNOSIS — F41.1 GENERALIZED ANXIETY DISORDER: Primary | ICD-10-CM

## 2021-08-09 NOTE — TELEPHONE ENCOUNTER
Routing to Dr. Harper to review below message.     Karen Bolton, RN   [facility] -- Triage Nurse

## 2021-08-10 RX ORDER — CLONAZEPAM 0.5 MG/1
0.5 TABLET ORAL 2 TIMES DAILY PRN
Qty: 16 TABLET | Refills: 0 | Status: SHIPPED | OUTPATIENT
Start: 2021-08-10

## 2021-08-10 RX ORDER — HYOSCYAMINE SULFATE 0.125 MG
TABLET ORAL
Qty: 90 TABLET | Refills: 0 | Status: SHIPPED | OUTPATIENT
Start: 2021-08-10 | End: 2021-08-24

## 2021-08-12 ENCOUNTER — MYC MEDICAL ADVICE (OUTPATIENT)
Dept: PEDIATRICS | Facility: CLINIC | Age: 41
End: 2021-08-12

## 2021-08-22 DIAGNOSIS — R10.9 ABDOMINAL CRAMPING: ICD-10-CM

## 2021-08-24 RX ORDER — HYOSCYAMINE SULFATE 0.125 MG
TABLET ORAL
Qty: 90 TABLET | Refills: 3 | Status: SHIPPED | OUTPATIENT
Start: 2021-08-24 | End: 2022-05-23

## 2021-08-24 NOTE — TELEPHONE ENCOUNTER
Prescription approved per [facility] Refill Protocol.    Bonita Donovan RN on 8/24/2021 at 10:25 AM

## 2021-08-27 NOTE — ANESTHESIA PREPROCEDURE EVALUATION
Anesthesia Plan  Procedure only, no anesthetic delivered        Plan for Epidural          Postoperative Care      Consents                          .  
past

## 2021-08-30 ENCOUNTER — ANCILLARY PROCEDURE (OUTPATIENT)
Dept: GENERAL RADIOLOGY | Facility: CLINIC | Age: 41
End: 2021-08-30
Attending: FAMILY MEDICINE
Payer: COMMERCIAL

## 2021-08-30 ENCOUNTER — OFFICE VISIT (OUTPATIENT)
Dept: URGENT CARE | Facility: URGENT CARE | Age: 41
End: 2021-08-30
Payer: COMMERCIAL

## 2021-08-30 VITALS
DIASTOLIC BLOOD PRESSURE: 68 MMHG | HEART RATE: 89 BPM | SYSTOLIC BLOOD PRESSURE: 114 MMHG | OXYGEN SATURATION: 99 % | TEMPERATURE: 97.9 F

## 2021-08-30 DIAGNOSIS — M25.521 RIGHT ELBOW PAIN: ICD-10-CM
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
## 2021-08-30 PROCEDURE — 99213 OFFICE O/P EST LOW 20 MIN: CPT | Performed by: FAMILY MEDICINE

## 2021-08-30 PROCEDURE — 73080 X-RAY EXAM OF ELBOW: CPT | Mod: RT | Performed by: RADIOLOGY

## 2021-08-30 NOTE — PATIENT INSTRUCTIONS
Arnica gel:  Apply to affected area 2-3 times per day to help with pain.  This is available over-the-counter.    Ibuprofen or naproxen to help with pain and swelling as well.

## 2021-08-30 NOTE — PROGRESS NOTES
ASSESSMENT:    ICD-10-CM    1. Fall, initial encounter  W19.XXXA XR Elbow Right G/E 3 Views   2. Right elbow pain  M25.521 XR Elbow Right G/E 3 Views     No fracture seen by me on my reading of the elbow films, and this has been confirmed by the radiologist.    PLAN:  Patient Instructions   Arnica gel:  Apply to affected area 2-3 times per day to help with pain.  This is available over-the-counter.    Ibuprofen or naproxen to help with pain and swelling as well.        SUBJECTIVE:  Yvonne Diez is a 40 year old female who presents to  after sustaining a fall while carrying her toddler.  She hurt her elbow in the fall.  The pain is primarily on the posterior aspect of the elbow.  Has had a little tingling into the forearm and hand, but this is improving.    OBJECTIVE:  /68   Pulse 89   Temp 97.9  F (36.6  C)   SpO2 99%   GEN: well-appearing, in NAD  EXT: R elbow with tenderness over the olecranon process, no swelling or ecchymosis here, mild tenderness in the forearm muscles but no bony tenderness.      My reading of the 3 views of the right elbow:  No fractures seen.

## 2021-10-02 ENCOUNTER — HEALTH MAINTENANCE LETTER (OUTPATIENT)
Age: 41
End: 2021-10-02

## 2022-04-25 ENCOUNTER — VIRTUAL VISIT (OUTPATIENT)
Dept: FAMILY MEDICINE | Facility: CLINIC | Age: 42
End: 2022-04-25
Payer: COMMERCIAL

## 2022-04-25 DIAGNOSIS — J18.9 PNEUMONIA DUE TO INFECTIOUS ORGANISM, UNSPECIFIED LATERALITY, UNSPECIFIED PART OF LUNG: Primary | ICD-10-CM

## 2022-04-25 PROCEDURE — 99214 OFFICE O/P EST MOD 30 MIN: CPT | Mod: 95 | Performed by: FAMILY MEDICINE

## 2022-04-25 RX ORDER — BENZONATATE 200 MG/1
200 CAPSULE ORAL 3 TIMES DAILY PRN
Qty: 30 CAPSULE | Refills: 0 | Status: SHIPPED | OUTPATIENT
Start: 2022-04-25 | End: 2022-05-23

## 2022-04-25 RX ORDER — AZITHROMYCIN 250 MG/1
TABLET, FILM COATED ORAL
Qty: 6 TABLET | Refills: 0 | Status: SHIPPED | OUTPATIENT
Start: 2022-04-25 | End: 2022-04-30

## 2022-04-25 RX ORDER — CODEINE PHOSPHATE AND GUAIFENESIN 10; 100 MG/5ML; MG/5ML
1-2 SOLUTION ORAL EVERY 4 HOURS PRN
Qty: 75 ML | Refills: 0 | Status: SHIPPED | OUTPATIENT
Start: 2022-04-25 | End: 2022-05-23

## 2022-04-25 ASSESSMENT — ENCOUNTER SYMPTOMS: CONSTITUTIONAL NEGATIVE: 1

## 2022-04-25 NOTE — PROGRESS NOTES
Type of service:  Video Visit    Yvonne Diez is a 41 year old female who is being evaluated via a billable video visit.      How would you like to obtain your AVS? MyChart  If dropped from the video visit, the video invitation should be resent by: Text to cell phone: [phone]  Will anyone else be joining your video visit? No    Video Start Time: 9:44 AM  Video End Time (time video stopped): 10:04AM  Originating Location (pt. Location): Home  Distant Location (provider location):  [facility]   Platform used for Video Visit: [facility]    Assessment/Plan:    Yvonne was seen today for headache.    Diagnoses and all orders for this visit:    Pneumonia due to infectious organism, unspecified laterality, unspecified part of lung: Persistent cough increasingly painful sensation in chest.  Concerning for community-acquired pneumonia.  Will treat symptomatically with benzonatate and guaifenesin/codeine.  We will treat empirically for pneumonia with azithromycin.  Follow-up if not improving.  -     benzonatate (TESSALON) 200 MG capsule; Take 1 capsule (200 mg) by mouth 3 times daily as needed for cough  -     guaiFENesin-codeine (ROBITUSSIN AC) 100-10 MG/5ML solution; Take 5-10 mLs by mouth every 4 hours as needed for cough  -     azithromycin (ZITHROMAX) 250 MG tablet; Take 2 tablets (500 mg) by mouth daily for 1 day, THEN 1 tablet (250 mg) daily for 4 days.     Return in about 1 week (around 5/2/2022).    Brandt Arroyo MD  _______________________________    Chief Complaint   Patient presents with     Headache     ST, cough, chest hurts, congestion, fatigue. Sx for 5 days and getting worse. 2 covid home tests NEG.     Subjective: Yvonne Diez is a 41 year old year old female who I have not seen in clinic before who presents with the following acute complaint(s):    Head congestion  Onset ~5 days ago  Symptoms in throat.    Pain with swallowing.  Copious nasal drainage  LAD:  lymph nodes bigger.   Coughing - fits with dry heaving and SOB  Covid: ngative at home  Fever: 100.0F  Past episodes: not this bad for 20 years.   Palliative: mucinex, theraful, nyquil, advil cold and sinus.  This helps with headache,      History of Present Illness       Reason for visit:  Illness-sore throat, painful cough, headache, congestion, fatigue  Symptom onset:  3-7 days ago  Symptom progression:  Worsening  Had these symptoms before:  Yes  Has tried/received treatment for these symptoms:  No    She eats 2-3 servings of fruits and vegetables daily.She consumes 0 sweetened beverage(s) daily.She exercises with enough effort to increase her heart rate 20 to 29 minutes per day.  She exercises with enough effort to increase her heart rate 3 or less days per week.   She is taking medications regularly.      Review of Systems   Constitutional: Negative.    All other systems reviewed and are negative.     Histories reviewed:    Meds  Problems                Objective:   There were no vitals taken for this visit.  Physical Exam  Nursing note reviewed.   Constitutional:       General: She is not in acute distress.     Appearance: Normal appearance. She is not ill-appearing.   HENT:      Head: Normocephalic and atraumatic.   Eyes:      Extraocular Movements: Extraocular movements intact.      Conjunctiva/sclera: Conjunctivae normal.   Pulmonary:      Effort: Pulmonary effort is normal.   Neurological:      Mental Status: She is alert and oriented to person, place, and time.   Psychiatric:         Attention and Perception: Attention normal.         Mood and Affect: Mood normal.         Speech: Speech normal.         Thought Content: Thought content normal.         No results found for this or any previous visit (from the past 48 hour(s)).  No results found for this visit on 04/25/22.    This note has been dictated using voice recognition software. Any grammatical or context distortions are unintentional and inherent  to the software

## 2022-05-11 SDOH — ECONOMIC STABILITY: FOOD INSECURITY: WITHIN THE PAST 12 MONTHS, THE FOOD YOU BOUGHT JUST DIDN'T LAST AND YOU DIDN'T HAVE MONEY TO GET MORE.: NEVER TRUE

## 2022-05-11 SDOH — HEALTH STABILITY: PHYSICAL HEALTH: ON AVERAGE, HOW MANY DAYS PER WEEK DO YOU ENGAGE IN MODERATE TO STRENUOUS EXERCISE (LIKE A BRISK WALK)?: 2 DAYS

## 2022-05-11 SDOH — ECONOMIC STABILITY: FOOD INSECURITY: WITHIN THE PAST 12 MONTHS, YOU WORRIED THAT YOUR FOOD WOULD RUN OUT BEFORE YOU GOT MONEY TO BUY MORE.: NEVER TRUE

## 2022-05-11 SDOH — ECONOMIC STABILITY: TRANSPORTATION INSECURITY
IN THE PAST 12 MONTHS, HAS LACK OF TRANSPORTATION KEPT YOU FROM MEETINGS, WORK, OR FROM GETTING THINGS NEEDED FOR DAILY LIVING?: NO

## 2022-05-11 SDOH — ECONOMIC STABILITY: INCOME INSECURITY: IN THE LAST 12 MONTHS, WAS THERE A TIME WHEN YOU WERE NOT ABLE TO PAY THE MORTGAGE OR RENT ON TIME?: NO

## 2022-05-11 SDOH — ECONOMIC STABILITY: TRANSPORTATION INSECURITY
IN THE PAST 12 MONTHS, HAS THE LACK OF TRANSPORTATION KEPT YOU FROM MEDICAL APPOINTMENTS OR FROM GETTING MEDICATIONS?: NO

## 2022-05-11 SDOH — HEALTH STABILITY: PHYSICAL HEALTH: ON AVERAGE, HOW MANY MINUTES DO YOU ENGAGE IN EXERCISE AT THIS LEVEL?: 30 MIN

## 2022-05-11 SDOH — ECONOMIC STABILITY: INCOME INSECURITY: HOW HARD IS IT FOR YOU TO PAY FOR THE VERY BASICS LIKE FOOD, HOUSING, MEDICAL CARE, AND HEATING?: NOT HARD AT ALL

## 2022-05-11 ASSESSMENT — LIFESTYLE VARIABLES
HOW OFTEN DO YOU HAVE SIX OR MORE DRINKS ON ONE OCCASION: MONTHLY
SKIP TO QUESTIONS 9-10: 0
HOW OFTEN DO YOU HAVE A DRINK CONTAINING ALCOHOL: 2-3 TIMES A WEEK
AUDIT-C TOTAL SCORE: 5
HOW MANY STANDARD DRINKS CONTAINING ALCOHOL DO YOU HAVE ON A TYPICAL DAY: 1 OR 2

## 2022-05-11 ASSESSMENT — PATIENT HEALTH QUESTIONNAIRE - PHQ9: SUM OF ALL RESPONSES TO PHQ QUESTIONS 1-9: 9

## 2022-05-11 ASSESSMENT — SOCIAL DETERMINANTS OF HEALTH (SDOH)
HOW OFTEN DO YOU GET TOGETHER WITH FRIENDS OR RELATIVES?: ONCE A WEEK
IN A TYPICAL WEEK, HOW MANY TIMES DO YOU TALK ON THE PHONE WITH FAMILY, FRIENDS, OR NEIGHBORS?: THREE TIMES A WEEK
HOW OFTEN DO YOU ATTEND CHURCH OR RELIGIOUS SERVICES?: NEVER
DO YOU BELONG TO ANY CLUBS OR ORGANIZATIONS SUCH AS CHURCH GROUPS UNIONS, FRATERNAL OR ATHLETIC GROUPS, OR SCHOOL GROUPS?: NO

## 2022-05-14 ENCOUNTER — HEALTH MAINTENANCE LETTER (OUTPATIENT)
Age: 42
End: 2022-05-14

## 2022-05-23 ENCOUNTER — OFFICE VISIT (OUTPATIENT)
Dept: PEDIATRICS | Facility: CLINIC | Age: 42
End: 2022-05-23
Payer: COMMERCIAL

## 2022-05-23 VITALS
TEMPERATURE: 97.7 F | DIASTOLIC BLOOD PRESSURE: 66 MMHG | SYSTOLIC BLOOD PRESSURE: 108 MMHG | WEIGHT: 198.6 LBS | HEART RATE: 73 BPM | OXYGEN SATURATION: 99 % | BODY MASS INDEX: 30.1 KG/M2 | HEIGHT: 68 IN | RESPIRATION RATE: 12 BRPM

## 2022-05-23 DIAGNOSIS — F33.41 RECURRENT MAJOR DEPRESSIVE DISORDER, IN PARTIAL REMISSION (H): ICD-10-CM

## 2022-05-23 DIAGNOSIS — R10.9 ABDOMINAL CRAMPING: ICD-10-CM

## 2022-05-23 DIAGNOSIS — Z78.9 IMMUNE TO VARICELLA: ICD-10-CM

## 2022-05-23 DIAGNOSIS — Z11.1 SCREENING EXAMINATION FOR PULMONARY TUBERCULOSIS: ICD-10-CM

## 2022-05-23 DIAGNOSIS — Z00.00 ROUTINE GENERAL MEDICAL EXAMINATION AT A HEALTH CARE FACILITY: Primary | ICD-10-CM

## 2022-05-23 DIAGNOSIS — F41.1 GENERALIZED ANXIETY DISORDER: ICD-10-CM

## 2022-05-23 DIAGNOSIS — Z11.59 NEED FOR HEPATITIS C SCREENING TEST: ICD-10-CM

## 2022-05-23 DIAGNOSIS — Z11.4 SCREENING FOR HIV (HUMAN IMMUNODEFICIENCY VIRUS): ICD-10-CM

## 2022-05-23 LAB
HCV AB SERPL QL IA: NONREACTIVE
HIV 1+2 AB+HIV1 P24 AG SERPL QL IA: NONREACTIVE
VZV IGG SER QL IA: 1023 INDEX
VZV IGG SER QL IA: POSITIVE

## 2022-05-23 PROCEDURE — 86787 VARICELLA-ZOSTER ANTIBODY: CPT | Performed by: INTERNAL MEDICINE

## 2022-05-23 PROCEDURE — 99395 PREV VISIT EST AGE 18-39: CPT | Performed by: INTERNAL MEDICINE

## 2022-05-23 PROCEDURE — 86481 TB AG RESPONSE T-CELL SUSP: CPT | Performed by: INTERNAL MEDICINE

## 2022-05-23 PROCEDURE — 87389 HIV-1 AG W/HIV-1&-2 AB AG IA: CPT | Performed by: INTERNAL MEDICINE

## 2022-05-23 PROCEDURE — 36415 COLL VENOUS BLD VENIPUNCTURE: CPT | Performed by: INTERNAL MEDICINE

## 2022-05-23 PROCEDURE — 86803 HEPATITIS C AB TEST: CPT | Performed by: INTERNAL MEDICINE

## 2022-05-23 RX ORDER — HYOSCYAMINE SULFATE 0.125 MG
TABLET ORAL
Qty: 90 TABLET | Refills: 3 | Status: SHIPPED | OUTPATIENT
Start: 2022-05-23

## 2022-05-23 RX ORDER — OXYCODONE HYDROCHLORIDE 5 MG/1
5 TABLET ORAL EVERY 6 HOURS PRN
COMMUNITY
End: 2023-07-10

## 2022-05-23 SDOH — ECONOMIC STABILITY: INCOME INSECURITY: IN THE LAST 12 MONTHS, WAS THERE A TIME WHEN YOU WERE NOT ABLE TO PAY THE MORTGAGE OR RENT ON TIME?: NO

## 2022-05-23 SDOH — ECONOMIC STABILITY: FOOD INSECURITY: WITHIN THE PAST 12 MONTHS, YOU WORRIED THAT YOUR FOOD WOULD RUN OUT BEFORE YOU GOT MONEY TO BUY MORE.: NEVER TRUE

## 2022-05-23 SDOH — ECONOMIC STABILITY: INCOME INSECURITY: HOW HARD IS IT FOR YOU TO PAY FOR THE VERY BASICS LIKE FOOD, HOUSING, MEDICAL CARE, AND HEATING?: NOT HARD AT ALL

## 2022-05-23 SDOH — HEALTH STABILITY: PHYSICAL HEALTH: ON AVERAGE, HOW MANY MINUTES DO YOU ENGAGE IN EXERCISE AT THIS LEVEL?: 30 MIN

## 2022-05-23 SDOH — ECONOMIC STABILITY: FOOD INSECURITY: WITHIN THE PAST 12 MONTHS, THE FOOD YOU BOUGHT JUST DIDN'T LAST AND YOU DIDN'T HAVE MONEY TO GET MORE.: NEVER TRUE

## 2022-05-23 SDOH — HEALTH STABILITY: PHYSICAL HEALTH: ON AVERAGE, HOW MANY DAYS PER WEEK DO YOU ENGAGE IN MODERATE TO STRENUOUS EXERCISE (LIKE A BRISK WALK)?: 2 DAYS

## 2022-05-23 ASSESSMENT — SOCIAL DETERMINANTS OF HEALTH (SDOH)
HOW OFTEN DO YOU ATTEND CHURCH OR RELIGIOUS SERVICES?: NEVER
DO YOU BELONG TO ANY CLUBS OR ORGANIZATIONS SUCH AS CHURCH GROUPS UNIONS, FRATERNAL OR ATHLETIC GROUPS, OR SCHOOL GROUPS?: NO
IN A TYPICAL WEEK, HOW MANY TIMES DO YOU TALK ON THE PHONE WITH FAMILY, FRIENDS, OR NEIGHBORS?: THREE TIMES A WEEK
HOW OFTEN DO YOU GET TOGETHER WITH FRIENDS OR RELATIVES?: ONCE A WEEK

## 2022-05-23 ASSESSMENT — PATIENT HEALTH QUESTIONNAIRE - PHQ9
SUM OF ALL RESPONSES TO PHQ QUESTIONS 1-9: 4
SUM OF ALL RESPONSES TO PHQ QUESTIONS 1-9: 4
10. IF YOU CHECKED OFF ANY PROBLEMS, HOW DIFFICULT HAVE THESE PROBLEMS MADE IT FOR YOU TO DO YOUR WORK, TAKE CARE OF THINGS AT HOME, OR GET ALONG WITH OTHER PEOPLE: NOT DIFFICULT AT ALL

## 2022-05-23 ASSESSMENT — ENCOUNTER SYMPTOMS
ARTHRALGIAS: 0
NERVOUS/ANXIOUS: 0
CHILLS: 0
FREQUENCY: 0
SHORTNESS OF BREATH: 0
HEARTBURN: 0
FEVER: 0
WEAKNESS: 0
DIARRHEA: 0
NAUSEA: 0
PARESTHESIAS: 0
COUGH: 0
DYSURIA: 0
PALPITATIONS: 0
MYALGIAS: 0
JOINT SWELLING: 0
CONSTIPATION: 0
HEMATOCHEZIA: 0
BREAST MASS: 0
ABDOMINAL PAIN: 1
HEADACHES: 0
SORE THROAT: 0
EYE PAIN: 0
HEMATURIA: 0
DIZZINESS: 0

## 2022-05-23 ASSESSMENT — PAIN SCALES - GENERAL: PAINLEVEL: MILD PAIN (2)

## 2022-05-23 ASSESSMENT — LIFESTYLE VARIABLES
AUDIT-C TOTAL SCORE: 5
HOW MANY STANDARD DRINKS CONTAINING ALCOHOL DO YOU HAVE ON A TYPICAL DAY: 1 OR 2
SKIP TO QUESTIONS 9-10: 0
HOW OFTEN DO YOU HAVE A DRINK CONTAINING ALCOHOL: 2-3 TIMES A WEEK
HOW OFTEN DO YOU HAVE SIX OR MORE DRINKS ON ONE OCCASION: MONTHLY

## 2022-05-23 NOTE — PATIENT INSTRUCTIONS
Differin over the counter for wrinkles. Use with sunscreen!    Labs today.     Hyoscyamine refilled and on file.    You should be called to schedule a therapy appointment.

## 2022-05-23 NOTE — PROGRESS NOTES
SUBJECTIVE:   CC: Yvonne Diez is an 41 year old woman who presents for preventive health visit.         Patient has been advised of split billing requirements and indicates understanding: Yes     Healthy Habits:     Bi-annual eye exam:  NO    Sleep apnea or symptoms of sleep apnea:  None    Diet:  Regular (no restrictions)    Frequency of exercise:  None    Taking medications regularly:  No    Barriers to taking medications:  None    Medication side effects:  None    PHQ-2 Total Score: 0    Additional concerns today:  Yes      Mammogram done on this date: 2016 (approximately), by this group: [facility] , results were normal.   Colonoscopy, [facility] 2012.    Starting [employer] school this fall, has some paperwork to be filled out.     Established with psychiatry which is going well, her therapist is leaving however and she needs new referral.     Continues to work with OB to manage endometriosis, still pretty severe and uses oxycodone as needed during periods. Considering hysterectomy but doesn't want to go on hormones, not sure if they would remove ovaries.     Hyoscyamine seems to help with some abdominal cramping.     Today's PHQ-2 Score:   PHQ-2 ( 1999 Pfizer) 5/23/2022   Q1: Little interest or pleasure in doing things 0   Q2: Feeling down, depressed or hopeless 0   PHQ-2 Score 0   PHQ-2 Total Score (12-17 Years)- Positive if 3 or more points; Administer PHQ-A if positive -   Q1: Little interest or pleasure in doing things Not at all   Q2: Feeling down, depressed or hopeless Not at all   PHQ-2 Score 0       Abuse: Current or Past (Physical, Sexual or Emotional) - No  Do you feel safe in your environment? Yes    Have you ever done Advance Care Planning? (For example, a Health Directive, POLST, or a discussion with a medical provider or your loved ones about your wishes): No, advance care planning information given to patient to review.  Patient declined advance care planning discussion at this time.    Social  History     Tobacco Use     Smoking status: Former Smoker     Years: 5.00     Types: Cigarettes, Cigarettes     Quit date: 12/1/2011     Years since quitting: 10.4     Smokeless tobacco: Never Used     Tobacco comment: Smoked 1 pack/week   Substance Use Topics     Alcohol use: Yes     Types: 8 Standard drinks or equivalent per week     Comment: 3 x week         Alcohol Use 5/23/2022   Prescreen: >3 drinks/day or >7 drinks/week? No       Reviewed orders with patient.  Reviewed health maintenance and updated orders accordingly - Yes  Patient Active Problem List   Diagnosis     Generalized anxiety disorder     Insomnia     Recurrent major depressive disorder, in partial remission (H)     Past Surgical History:   Procedure Laterality Date     BIOPSY       BLOOD PATCH N/A 04/26/2017    Procedure: EPIDURAL BLOOD PATCH;  EPIDURAL BLOOD PATCH;  Surgeon: GENERIC ANESTHESIA PROVIDER;  Location: [facility] OR     COLONOSCOPY       GYN SURGERY       [facility] LAPAROSCOPY, SURGICAL, ABDOMEN, PERITONEUM & OMENTUM; DX W/ OR W/O SPECIMEN(S)  01/01/2006    Laparoscopy, diagnostic     [facility] GENERAL SURG (ABSTRACTED)  01/01/2001    wisdom  teeth       Social History     Tobacco Use     Smoking status: Former Smoker     Years: 5.00     Types: Cigarettes, Cigarettes     Quit date: 12/1/2011     Years since quitting: 10.4     Smokeless tobacco: Never Used     Tobacco comment: Smoked 1 pack/week   Substance Use Topics     Alcohol use: Yes     Types: 8 Standard drinks or equivalent per week     Comment: 3 x week     Family History   Problem Relation Age of Onset     Endocrine Disease Mother         Lupus     Connective Tissue Disorder Mother      Cancer Maternal Grandmother         lung     Prostate Cancer Maternal Grandfather      Musculoskeletal Disorder Paternal Uncle         Jessica geerigs disease           Breast Cancer Screening:    Breast CA Risk Assessment (FHS-7) 5/11/2022 5/11/2022   Do you have a family history of breast, colon, or ovarian cancer?  "No / Unknown No / Unknown       click delete button to remove this line now  Patient under 40 years of age: Routine Mammogram Screening not recommended.   Pertinent mammograms are reviewed under the imaging tab.    History of abnormal Pap smear: NO - age 30-65 PAP every 5 years with negative HPV co-testing recommended  PAP / HPV 3/22/2012   PAP (Historical) NIL     Reviewed and updated as needed this visit by clinical staff   Tobacco  Allergies    Med Hx  Surg Hx  Fam Hx         Letty Mclean on 5/23/2022 at 9:58 AM    Reviewed and updated as needed this visit by Provider     Meds     Fam Hx               Review of Systems   Constitutional: Negative for chills and fever.   HENT: Negative for congestion, ear pain, hearing loss and sore throat.    Eyes: Negative for pain and visual disturbance.   Respiratory: Negative for shortness of breath.    Cardiovascular: Negative for chest pain, palpitations and peripheral edema.   Gastrointestinal: Positive for abdominal pain. Negative for constipation, diarrhea, heartburn, hematochezia and nausea.   Breasts:  Negative for tenderness, breast mass and discharge.   Genitourinary: Positive for pelvic pain, vaginal bleeding and vaginal discharge. Negative for dysuria, frequency, genital sores, hematuria and urgency.   Musculoskeletal: Negative for arthralgias, joint swelling and myalgias.   Skin: Negative for rash.   Neurological: Negative for dizziness, weakness, headaches and paresthesias.   Psychiatric/Behavioral: Negative for mood changes. The patient is not nervous/anxious.           OBJECTIVE:   /66 (BP Location: Right arm, Patient Position: Sitting, Cuff Size: Adult Regular)   Pulse 73   Temp 97.7  F (36.5  C) (Temporal)   Resp 12   Ht 1.721 m (5' 7.75\")   Wt 90.1 kg (198 lb 9.6 oz)   LMP  (LMP Unknown)   SpO2 99%   BMI 30.42 kg/m    Physical Exam  GENERAL: healthy, alert and no distress  EYES: Eyes grossly normal to inspection, PERRL and conjunctivae and " sclerae normal  HENT: ear canals and TM's normal, nose and mouth without ulcers or lesions  NECK: no adenopathy, no asymmetry, masses, or scars and thyroid normal to palpation  RESP: lungs clear to auscultation - no rales, rhonchi or wheezes  CV: regular rate and rhythm, normal S1 S2, no S3 or S4, no murmur, click or rub, no peripheral edema and peripheral pulses strong  ABDOMEN: soft, nontender, no hepatosplenomegaly, no masses and bowel sounds normal  MS: no gross musculoskeletal defects noted, no edema  SKIN: no suspicious lesions or rashes  NEURO: Normal strength and tone, mentation intact and speech normal  PSYCH: mentation appears normal, affect normal/bright    Diagnostic Test Results:  Labs reviewed in Epic    ASSESSMENT/PLAN:   1. Routine general medical examination at a health care facility  Counseling as below. Pap up to date through OB.     2. Screening for HIV (human immunodeficiency virus)  - HIV Antigen Antibody Combo    3. Need for hepatitis C screening test  - Hepatitis C Screen Reflex to HCV RNA Quant and Genotype    4. Abdominal cramping  May have component of IBS in addition to endometriosis, good response to hyoscyamine.   - hyoscyamine (LEVSIN) 0.125 MG tablet; TAKE 1 TABLET(125 MCG) BY MOUTH EVERY 4 HOURS AS NEEDED FOR CRAMPING  Dispense: 90 tablet; Refill: 3    5. 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   - Quantiferon TB Gold Plus    6. Immune to varicella  Will check for nursing school.   - Varicella Zoster Virus Antibody IgG    7. Generalized anxiety disorder  Managed by psychiatry, referral placed for therapy. Stable per patient report.   - Adult Mental Health  Referral; Future    8. Recurrent major depressive disorder, in partial remission (H)  Managed by psychiatry, referral placed for therapy. Stable per patient report.   - Adult Mental Health  Referral; Future          COUNSELING:  Reviewed preventive health counseling, as  "reflected in patient instructions       Regular exercise       Healthy diet/nutrition       Vision screening       Contraception       Consider Hep C screening for all patients one time for ages 18-79 years       HIV screeninx in teen years, 1x in adult years, and at intervals if high risk    Estimated body mass index is 30.42 kg/m  as calculated from the following:    Height as of this encounter: 1.721 m (5' 7.75\").    Weight as of this encounter: 90.1 kg (198 lb 9.6 oz).    Weight management plan: Discussed healthy diet and exercise guidelines    She reports that she quit smoking about 10 years ago. Her smoking use included cigarettes and cigarettes. She quit after 5.00 years of use. She has never used smokeless tobacco.      Counseling Resources:  ATP IV Guidelines  Pooled Cohorts Equation Calculator  Breast Cancer Risk Calculator  BRCA-Related Cancer Risk Assessment: FHS-7 Tool  FRAX Risk Assessment  ICSI Preventive Guidelines  Dietary Guidelines for Americans,   [employer]'s MyPlate  ASA Prophylaxis  Lung CA Screening    Brandee Estrada MD  [facility]  "

## 2022-05-24 LAB
GAMMA INTERFERON BACKGROUND BLD IA-ACNC: 0.01 IU/ML
M TB IFN-G BLD-IMP: NEGATIVE
M TB IFN-G CD4+ BCKGRND COR BLD-ACNC: 9.99 IU/ML
MITOGEN IGNF BCKGRD COR BLD-ACNC: 0.03 IU/ML
MITOGEN IGNF BCKGRD COR BLD-ACNC: 0.03 IU/ML
QUANTIFERON MITOGEN: 10 IU/ML
QUANTIFERON NIL TUBE: 0.01 IU/ML
QUANTIFERON TB1 TUBE: 0.04 IU/ML
QUANTIFERON TB2 TUBE: 0.04

## 2022-09-03 ENCOUNTER — HEALTH MAINTENANCE LETTER (OUTPATIENT)
Age: 42
End: 2022-09-03

## 2023-03-20 ENCOUNTER — E-VISIT (OUTPATIENT)
Dept: URGENT CARE | Facility: CLINIC | Age: 43
End: 2023-03-20

## 2023-03-20 ENCOUNTER — E-VISIT (OUTPATIENT)
Dept: PEDIATRICS | Facility: CLINIC | Age: 43
End: 2023-03-20

## 2023-03-20 DIAGNOSIS — R21 RASH AND NONSPECIFIC SKIN ERUPTION: Primary | ICD-10-CM

## 2023-03-20 DIAGNOSIS — R21 RASH: Primary | ICD-10-CM

## 2023-03-20 PROCEDURE — 99207 PR NON-BILLABLE SERV PER CHARTING: CPT | Performed by: FAMILY MEDICINE

## 2023-03-20 PROCEDURE — 99421 OL DIG E/M SVC 5-10 MIN: CPT | Performed by: NURSE PRACTITIONER

## 2023-03-20 RX ORDER — MUPIROCIN 20 MG/G
OINTMENT TOPICAL 3 TIMES DAILY
Qty: 15 G | Refills: 0 | Status: SHIPPED | OUTPATIENT
Start: 2023-03-20 | End: 2023-07-10

## 2023-03-20 NOTE — PATIENT INSTRUCTIONS
Dear Yvonne Diez,    We are sorry you are not feeling well. Based on the responses you provided, it is recommended that you be seen in-person in urgent care so we can better evaluate your symptoms. Please click here to find the nearest urgent care location to you.   You will not be charged for this Visit. Thank you for trusting us with your care.    Catina Bautista MD

## 2023-03-21 ENCOUNTER — OFFICE VISIT (OUTPATIENT)
Dept: URGENT CARE | Facility: URGENT CARE | Age: 43
End: 2023-03-21
Payer: COMMERCIAL

## 2023-03-21 VITALS
HEART RATE: 75 BPM | SYSTOLIC BLOOD PRESSURE: 118 MMHG | RESPIRATION RATE: 14 BRPM | DIASTOLIC BLOOD PRESSURE: 66 MMHG | BODY MASS INDEX: 30.48 KG/M2 | WEIGHT: 199 LBS | TEMPERATURE: 99.1 F | OXYGEN SATURATION: 99 %

## 2023-03-21 DIAGNOSIS — R59.1 LYMPHADENOPATHY: ICD-10-CM

## 2023-03-21 DIAGNOSIS — L73.9 FOLLICULITIS: ICD-10-CM

## 2023-03-21 DIAGNOSIS — L21.9 SEBORRHEIC DERMATITIS OF SCALP: ICD-10-CM

## 2023-03-21 DIAGNOSIS — L01.00 IMPETIGO: Primary | ICD-10-CM

## 2023-03-21 LAB
BASOPHILS # BLD AUTO: 0 10E3/UL (ref 0–0.2)
BASOPHILS NFR BLD AUTO: 0 %
EOSINOPHIL # BLD AUTO: 0.3 10E3/UL (ref 0–0.7)
EOSINOPHIL NFR BLD AUTO: 3 %
LYMPHOCYTES # BLD AUTO: 2.7 10E3/UL (ref 0.8–5.3)
LYMPHOCYTES NFR BLD AUTO: 32 %
MONOCYTES # BLD AUTO: 0.9 10E3/UL (ref 0–1.3)
MONOCYTES NFR BLD AUTO: 11 %
NEUTROPHILS # BLD AUTO: 4.5 10E3/UL (ref 1.6–8.3)
NEUTROPHILS NFR BLD AUTO: 53 %
WBC # BLD AUTO: 8.5 10E3/UL (ref 4–11)

## 2023-03-21 PROCEDURE — 99214 OFFICE O/P EST MOD 30 MIN: CPT | Performed by: PHYSICIAN ASSISTANT

## 2023-03-21 PROCEDURE — 36415 COLL VENOUS BLD VENIPUNCTURE: CPT | Performed by: PHYSICIAN ASSISTANT

## 2023-03-21 PROCEDURE — 85004 AUTOMATED DIFF WBC COUNT: CPT | Performed by: PHYSICIAN ASSISTANT

## 2023-03-21 PROCEDURE — 85048 AUTOMATED LEUKOCYTE COUNT: CPT | Performed by: PHYSICIAN ASSISTANT

## 2023-03-21 RX ORDER — KETOCONAZOLE 20 MG/ML
SHAMPOO TOPICAL DAILY PRN
Qty: 120 ML | Refills: 0 | Status: SHIPPED | OUTPATIENT
Start: 2023-03-21 | End: 2023-05-12 | Stop reason: SINTOL

## 2023-03-21 RX ORDER — CEPHALEXIN 500 MG/1
500 CAPSULE ORAL 3 TIMES DAILY
Qty: 30 CAPSULE | Refills: 0 | Status: SHIPPED | OUTPATIENT
Start: 2023-03-21 | End: 2023-03-31

## 2023-03-21 NOTE — PROGRESS NOTES
SUBJECTIVE:  Yvonne Diez is a 42 year old female who presents to the clinic today for a rashes.  Baseline crusting on scalp that is itchy that comes and goes.  Seems to have improved as of late.  Had enlarged post auricular nodes bilaterally, but they have calmed down.     Experiencing painful solitary small sores on face.  Also enflamed follicles in groin area following skiing excursion.      Past Medical History:   Diagnosis Date     Anxiety      Depressive disorder      Endometriosis      Current Outpatient Medications   Medication Sig Dispense Refill     buPROPion (WELLBUTRIN SR) 200 MG 12 hr tablet Take 200 mg by mouth       Calcium Carb-Cholecalciferol (CALCIUM 1000 + D PO) Take  by mouth daily.       cephALEXin (KEFLEX) 500 MG capsule Take 1 capsule (500 mg) by mouth 3 times daily for 10 days 30 capsule 0     clonazePAM (KLONOPIN) 0.5 MG tablet Take 1 tablet (0.5 mg) by mouth 2 times daily as needed for anxiety 16 tablet 0     escitalopram (LEXAPRO) 20 MG tablet        hyoscyamine (LEVSIN) 0.125 MG tablet TAKE 1 TABLET(125 MCG) BY MOUTH EVERY 4 HOURS AS NEEDED FOR CRAMPING 90 tablet 3     ibuprofen (ADVIL/MOTRIN) 200 MG capsule Take 200 mg by mouth every 4 hours as needed. 2 tablets as needed for pain       ketoconazole (NIZORAL) 2 % external shampoo Apply topically daily as needed for itching or irritation 120 mL 0     Multiple Vitamins-Minerals (MULTI MAX PO) Take  by mouth daily.       mupirocin (BACTROBAN) 2 % external ointment Apply topically 3 times daily 15 g 0     norethindrone-ethinyl estradiol (MICROGESTIN 1/20) 1-20 MG-MCG tablet Take 1 tablet by mouth       oxyCODONE (ROXICODONE) 5 MG tablet Take 5 mg by mouth every 6 hours as needed       traZODone (DESYREL) 50 MG tablet Take 1-2 tablets nightly as needed for insomnia       acetaminophen (TYLENOL) 325 MG tablet Take 325-650 mg by mouth every 6 hours as needed for mild pain (Patient not taking: Reported on 3/21/2023)       Social History      Tobacco Use     Smoking status: Former     Years: 5.00     Types: Cigarettes     Quit date: 2011     Years since quittin.3     Smokeless tobacco: Never     Tobacco comments:     Smoked 1 pack/week   Substance Use Topics     Alcohol use: Yes     Types: 8 Standard drinks or equivalent per week     Comment: 3 x week       ROS:  Review of systems negative except as stated above.    EXAM:   /66 (BP Location: Right arm, Patient Position: Chair, Cuff Size: Adult Regular)   Pulse 75   Temp 99.1  F (37.3  C) (Oral)   Resp 14   Wt 90.3 kg (199 lb)   SpO2 99%   BMI 30.48 kg/m    GENERAL: alert, no acute distress.  SKIN: small crusted lesions approximaely 2-3 mm in diameter on face (brow and chin and nare)  GENERAL APPEARANCE: healthy, alert and no distress  NEURO: Normal strength and tone, sensory exam grossly normal,  normal speech and mentation      Results for orders placed or performed in visit on 23   WBC and Differential     Status: None   Result Value Ref Range    WBC Count 8.5 4.0 - 11.0 10e3/uL    % Neutrophils 53 %    % Lymphocytes 32 %    % Monocytes 11 %    % Eosinophils 3 %    % Basophils 0 %    Absolute Neutrophils 4.5 1.6 - 8.3 10e3/uL    Absolute Lymphocytes 2.7 0.8 - 5.3 10e3/uL    Absolute Monocytes 0.9 0.0 - 1.3 10e3/uL    Absolute Eosinophils 0.3 0.0 - 0.7 10e3/uL    Absolute Basophils 0.0 0.0 - 0.2 10e3/uL   WBC with Diff     Status: None    Narrative    The following orders were created for panel order WBC with Diff.  Procedure                               Abnormality         Status                     ---------                               -----------         ------                     WBC and Differential[[id number]]                             Final result                 Please view results for these tests on the individual orders.       ASSESSMENT:  (L01.00) Impetigo  (primary encounter diagnosis)  Plan: cephALEXin (KEFLEX) 500 MG capsule      (L73.9)  Folliculitis  Plan: cephALEXin (KEFLEX) 500 MG capsule      (L21.9) Seborrheic dermatitis of scalp  Plan: ketoconazole (NIZORAL) 2 % external shampoo      (R59.1) Lymphadenopathy  Plan: WBC with Diff    Follow up with PCP if symptoms worsen or fail to improve  There are no Patient Instructions on file for this visit.

## 2023-05-12 ENCOUNTER — VIRTUAL VISIT (OUTPATIENT)
Dept: FAMILY MEDICINE | Facility: CLINIC | Age: 43
End: 2023-05-12
Payer: COMMERCIAL

## 2023-05-12 ENCOUNTER — LAB (OUTPATIENT)
Dept: LAB | Facility: CLINIC | Age: 43
End: 2023-05-12
Attending: STUDENT IN AN ORGANIZED HEALTH CARE EDUCATION/TRAINING PROGRAM
Payer: COMMERCIAL

## 2023-05-12 DIAGNOSIS — J02.0 STREPTOCOCCAL PHARYNGITIS: Primary | ICD-10-CM

## 2023-05-12 DIAGNOSIS — Z87.891 FORMER SMOKER: ICD-10-CM

## 2023-05-12 DIAGNOSIS — J20.8 ACUTE BRONCHITIS DUE TO OTHER SPECIFIED ORGANISMS: ICD-10-CM

## 2023-05-12 PROBLEM — H52.223 REGULAR ASTIGMATISM, BILATERAL: Status: ACTIVE | Noted: 2022-10-04

## 2023-05-12 PROBLEM — N87.1 CIN II (CERVICAL INTRAEPITHELIAL NEOPLASIA II): Status: ACTIVE | Noted: 2019-05-29

## 2023-05-12 PROBLEM — I80.02 THROMBOPHLEBITIS OF SUPERFICIAL VEINS OF LEFT LOWER EXTREMITY: Status: ACTIVE | Noted: 2019-04-24

## 2023-05-12 PROBLEM — H31.003 RETINAL SCAR, BILATERAL: Status: ACTIVE | Noted: 2022-10-04

## 2023-05-12 PROBLEM — N91.5 OLIGOMENORRHEA: Status: ACTIVE | Noted: 2018-02-12

## 2023-05-12 PROBLEM — H35.413 BILATERAL RETINAL LATTICE DEGENERATION: Status: ACTIVE | Noted: 2022-10-04

## 2023-05-12 PROBLEM — H52.13 MYOPIA, BILATERAL: Status: ACTIVE | Noted: 2022-10-04

## 2023-05-12 LAB
DEPRECATED S PYO AG THROAT QL EIA: POSITIVE
FLUAV AG SPEC QL IA: NEGATIVE
FLUBV AG SPEC QL IA: NEGATIVE

## 2023-05-12 PROCEDURE — 87880 STREP A ASSAY W/OPTIC: CPT | Mod: VID | Performed by: STUDENT IN AN ORGANIZED HEALTH CARE EDUCATION/TRAINING PROGRAM

## 2023-05-12 PROCEDURE — 87804 INFLUENZA ASSAY W/OPTIC: CPT | Mod: VID | Performed by: STUDENT IN AN ORGANIZED HEALTH CARE EDUCATION/TRAINING PROGRAM

## 2023-05-12 PROCEDURE — 99214 OFFICE O/P EST MOD 30 MIN: CPT | Mod: VID | Performed by: STUDENT IN AN ORGANIZED HEALTH CARE EDUCATION/TRAINING PROGRAM

## 2023-05-12 PROCEDURE — 87635 SARS-COV-2 COVID-19 AMP PRB: CPT

## 2023-05-12 RX ORDER — AZITHROMYCIN 250 MG/1
TABLET, FILM COATED ORAL
Qty: 6 TABLET | Refills: 0 | Status: SHIPPED | OUTPATIENT
Start: 2023-05-12 | End: 2023-05-12 | Stop reason: ALTCHOICE

## 2023-05-12 RX ORDER — CITALOPRAM HYDROBROMIDE 40 MG/1
40 TABLET ORAL
COMMUNITY
Start: 2023-04-26 | End: 2023-11-15 | Stop reason: ALTCHOICE

## 2023-05-12 RX ORDER — PREDNISONE 20 MG/1
20 TABLET ORAL DAILY
Qty: 5 TABLET | Refills: 0 | Status: SHIPPED | OUTPATIENT
Start: 2023-05-12 | End: 2023-05-17

## 2023-05-12 RX ORDER — AMOXICILLIN 500 MG/1
500 TABLET, FILM COATED ORAL 2 TIMES DAILY
Qty: 20 TABLET | Refills: 0 | Status: SHIPPED | OUTPATIENT
Start: 2023-05-12 | End: 2023-05-22

## 2023-05-12 RX ORDER — NORETHINDRONE ACETATE AND ETHINYL ESTRADIOL 1MG-20(21)
1 KIT ORAL DAILY
COMMUNITY
Start: 2022-03-21 | End: 2023-11-15

## 2023-05-12 ASSESSMENT — ANXIETY QUESTIONNAIRES
6. BECOMING EASILY ANNOYED OR IRRITABLE: SEVERAL DAYS
IF YOU CHECKED OFF ANY PROBLEMS ON THIS QUESTIONNAIRE, HOW DIFFICULT HAVE THESE PROBLEMS MADE IT FOR YOU TO DO YOUR WORK, TAKE CARE OF THINGS AT HOME, OR GET ALONG WITH OTHER PEOPLE: NOT DIFFICULT AT ALL
1. FEELING NERVOUS, ANXIOUS, OR ON EDGE: SEVERAL DAYS
GAD7 TOTAL SCORE: 4
7. FEELING AFRAID AS IF SOMETHING AWFUL MIGHT HAPPEN: NOT AT ALL
5. BEING SO RESTLESS THAT IT IS HARD TO SIT STILL: NOT AT ALL
2. NOT BEING ABLE TO STOP OR CONTROL WORRYING: NOT AT ALL
GAD7 TOTAL SCORE: 4
3. WORRYING TOO MUCH ABOUT DIFFERENT THINGS: SEVERAL DAYS

## 2023-05-12 ASSESSMENT — PATIENT HEALTH QUESTIONNAIRE - PHQ9
5. POOR APPETITE OR OVEREATING: SEVERAL DAYS
SUM OF ALL RESPONSES TO PHQ QUESTIONS 1-9: 6

## 2023-05-12 NOTE — PROGRESS NOTES
"Krishan is a 42 year old who is being evaluated via a billable video visit.      How would you like to obtain your AVS? MyChart  If the video visit is dropped, the invitation should be resent by: Text to cell phone: [phone]  Will anyone else be joining your video visit? No        Assessment & Plan     1. Streptococcal pharyngitis    - predniSONE (DELTASONE) 20 MG tablet; Take 1 tablet (20 mg) by mouth daily for 5 days Take with food  Dispense: 5 tablet; Refill: 0  - magic mouthwash (ENTER INGREDIENTS IN COMMENTS) suspension; Take 5 mLs by mouth every 6 hours as needed 40 ml of viscous lidocaine 2%,40 ml prednisolone 15 mg per 5ml solution, 40 ml of distilled water,  Dispense: 120 mL; Refill: 0  - Influenza A & B Antigen - Clinic Collect  - Symptomatic COVID-19 Virus (Coronavirus) by PCR; Future  - Streptococcus A Rapid Screen w/Reflex to PCR - Clinic Collect  - REVIEW OF HEALTH MAINTENANCE PROTOCOL ORDERS  - PRIMARY CARE FOLLOW-UP SCHEDULING; Future  - amoxicillin (AMOXIL) 500 MG tablet; Take 1 tablet (500 mg) by mouth 2 times daily for 10 days  Dispense: 20 tablet; Refill: 0  Lab positive for strep.  2. Former smoker    - predniSONE (DELTASONE) 20 MG tablet; Take 1 tablet (20 mg) by mouth daily for 5 days Take with food  Dispense: 5 tablet; Refill: 0  - magic mouthwash (ENTER INGREDIENTS IN COMMENTS) suspension; Take 5 mLs by mouth every 6 hours as needed 40 ml of viscous lidocaine 2%,40 ml prednisolone 15 mg per 5ml solution, 40 ml of distilled water,  Dispense: 120 mL; Refill: 0  - Influenza A & B Antigen - Clinic Collect  - Symptomatic COVID-19 Virus (Coronavirus) by PCR; Future  - Streptococcus A Rapid Screen w/Reflex to PCR - Clinic Collect  - REVIEW OF HEALTH MAINTENANCE PROTOCOL ORDERS          BMI:   Estimated body mass index is 30.48 kg/m  as calculated from the following:    Height as of 5/23/22: 1.721 m (5' 7.75\").    Weight as of 3/21/23: 90.3 kg (199 lb).   Weight management plan: Discussed healthy " diet and exercise guidelines      Raven Ramos MD  [facility] QUINN Nickerson is a 42 year old, presenting for the following health issues:  Sick        5/12/2023     8:50 AM   Additional Questions   Roomed by MP   Accompanied by NA         5/12/2023     8:50 AM   Patient Reported Additional Medications   Patient reports taking the following new medications Medications missing from list-provider please review and add celexa     HPI     Symptom duration:  sunday   Sympom severity:  moderate   Treatments tried:  salt water, tylenol, reza pot, otc cold and flu, advil, throat spray, tessalon   Contacts:  none              Symptoms: Present Comment   Fever/Chills x 100   Fatigue/Fussy x fatigue   Muscle Aches     Eye Issue     Sneezing     Nasal Faizan/Drg x    Sinus Pressure/Pain     Loss of smell     Dental pain     Sore Throat x    Swollen Glands     Ear Pain/Fullness x Right ear   Cough x coughing up phlegm   Wheeze     Shortness of breath     Rash     Headache x    Stomach/GI issues     Other              Review of Systems   Constitutional, HEENT, cardiovascular, pulmonary, gi and gu systems are negative, except as otherwise noted.      Objective           Vitals:  No vitals were obtained today due to virtual visit.    Physical Exam   GENERAL: Healthy, alert and no distress  EYES: Eyes grossly normal to inspection.  No discharge or erythema, or obvious scleral/conjunctival abnormalities.  RESP: No audible wheeze, cough, or visible cyanosis.  No visible retractions or increased work of breathing.    SKIN: Visible skin clear. No significant rash, abnormal pigmentation or lesions.  PSYCH: Mentation appears normal, affect normal/bright, judgement and insight intact, normal speech and appearance well-groomed.        Video-Visit Details    Type of service:  Video Visit     Originating Location (pt. Location): Home  Distant Location (provider location):  On-site  Platform used for Video  Visit: [place]

## 2023-05-12 NOTE — PATIENT INSTRUCTIONS
Kulwant Nickerson,    Thank you for allowing [facility] to manage your care.    please go to the laboratory to get your laboratory studies.        I sent your prescriptions to your pharmacy.        For your convenience, test results are released as soon as they are available  Please allow 1-2 business days for me to send you a comment about your results.  If not done so, I encourage you to login into [facility] ([email or web address] to review your results in real time.     If you have any questions or concerns, please feel free to call us at [phone].    Sincerely,    Dr. Ramos    Did you know?      You can schedule a video visit for follow-up appointments as well as future appointments for certain conditions.  Please see the below link.     [email or web address]    If you have not already done so,  I encourage you to sign up for [employer] ([email or web address]).  This will allow you to review your results, securely communicate with a provider, and schedule virtual visits as well.

## 2023-05-13 LAB — SARS-COV-2 RNA RESP QL NAA+PROBE: NEGATIVE

## 2023-05-17 ENCOUNTER — MYC MEDICAL ADVICE (OUTPATIENT)
Dept: PEDIATRICS | Facility: CLINIC | Age: 43
End: 2023-05-17
Payer: COMMERCIAL

## 2023-05-17 DIAGNOSIS — Z11.1 SCREENING EXAMINATION FOR PULMONARY TUBERCULOSIS: Primary | ICD-10-CM

## 2023-05-18 ENCOUNTER — LAB (OUTPATIENT)
Dept: LAB | Facility: CLINIC | Age: 43
End: 2023-05-18
Payer: COMMERCIAL

## 2023-05-18 DIAGNOSIS — Z11.1 SCREENING EXAMINATION FOR PULMONARY TUBERCULOSIS: ICD-10-CM

## 2023-05-18 PROCEDURE — 86481 TB AG RESPONSE T-CELL SUSP: CPT

## 2023-05-18 PROCEDURE — 36415 COLL VENOUS BLD VENIPUNCTURE: CPT

## 2023-05-21 LAB
GAMMA INTERFERON BACKGROUND BLD IA-ACNC: 0.02 IU/ML
M TB IFN-G BLD-IMP: NEGATIVE
M TB IFN-G CD4+ BCKGRND COR BLD-ACNC: 9.98 IU/ML
MITOGEN IGNF BCKGRD COR BLD-ACNC: 0 IU/ML
MITOGEN IGNF BCKGRD COR BLD-ACNC: 0.01 IU/ML
QUANTIFERON MITOGEN: 10 IU/ML
QUANTIFERON NIL TUBE: 0.02 IU/ML
QUANTIFERON TB1 TUBE: 0.03 IU/ML
QUANTIFERON TB2 TUBE: 0.02

## 2023-05-25 ENCOUNTER — MYC MEDICAL ADVICE (OUTPATIENT)
Dept: PEDIATRICS | Facility: CLINIC | Age: 43
End: 2023-05-25

## 2023-05-25 ENCOUNTER — VIRTUAL VISIT (OUTPATIENT)
Dept: PEDIATRICS | Facility: CLINIC | Age: 43
End: 2023-05-25
Payer: COMMERCIAL

## 2023-05-25 ENCOUNTER — MYC MEDICAL ADVICE (OUTPATIENT)
Dept: FAMILY MEDICINE | Facility: CLINIC | Age: 43
End: 2023-05-25
Payer: COMMERCIAL

## 2023-05-25 DIAGNOSIS — J02.0 STREPTOCOCCAL PHARYNGITIS: Primary | ICD-10-CM

## 2023-05-25 PROCEDURE — 99213 OFFICE O/P EST LOW 20 MIN: CPT | Mod: 93 | Performed by: PHYSICIAN ASSISTANT

## 2023-05-25 RX ORDER — FLUCONAZOLE 150 MG/1
150 TABLET ORAL ONCE
Qty: 1 TABLET | Refills: 0 | Status: SHIPPED | OUTPATIENT
Start: 2023-05-25 | End: 2023-05-25

## 2023-05-25 RX ORDER — CEPHALEXIN 500 MG/1
500 CAPSULE ORAL 2 TIMES DAILY
Qty: 20 CAPSULE | Refills: 0 | Status: SHIPPED | OUTPATIENT
Start: 2023-05-25 | End: 2023-07-10

## 2023-05-25 NOTE — PROGRESS NOTES
Krishan is a 42 year old who is being evaluated via a billable telephone visit.      Assessment & Plan     Streptococcal pharyngitis  Likely treatment failure. Begin antibiotics.   - cephALEXin (KEFLEX) 500 MG capsule; Take 1 capsule (500 mg) by mouth 2 times daily  - fluconazole (DIFLUCAN) 150 MG tablet; Take 1 tablet (150 mg) by mouth once for 1 dose    KENROY Hu [facility]    Subjective   Krishan is a 42 year old, presenting for the following health issues:  No chief complaint on file.        5/12/2023     8:50 AM   Additional Questions   Roomed by MP   Accompanied by EDI     HPI   Finished course of amox. POSITIVE for strep on 5/12. Symptoms returned immediately after stopping antibiotics.   Two small children and feeling well.     No fevers, chills, fatigue, body achy. ST, lymph nodes swollen. Right ear pain. No cough    Review of Systems   Constitutional, HEENT, cardiovascular, pulmonary, gi and gu systems are negative, except as otherwise noted.      Objective           Vitals:  No vitals were obtained today due to virtual visit.    Physical Exam   alert and no distress  PSYCH: Alert and oriented times 3; coherent speech, normal   rate and volume, able to articulate logical thoughts, able   to abstract reason, no tangential thoughts, no hallucinations   or delusions  Her affect is normal  RESP: No cough, no audible wheezing, able to talk in full sentences  Remainder of exam unable to be completed due to telephone visits    No results found for any visits on 05/25/23.    Phone call duration: 6 minutes

## 2023-05-25 NOTE — TELEPHONE ENCOUNTER
Called and spoke to pt, she lives in [place] and plans to go to urgent care.     Roxana Camarena RN  [facility]

## 2023-07-10 ENCOUNTER — OFFICE VISIT (OUTPATIENT)
Dept: PEDIATRICS | Facility: CLINIC | Age: 43
End: 2023-07-10
Payer: COMMERCIAL

## 2023-07-10 VITALS
BODY MASS INDEX: 32.13 KG/M2 | HEIGHT: 68 IN | RESPIRATION RATE: 14 BRPM | OXYGEN SATURATION: 99 % | DIASTOLIC BLOOD PRESSURE: 60 MMHG | SYSTOLIC BLOOD PRESSURE: 106 MMHG | HEART RATE: 83 BPM | WEIGHT: 212 LBS

## 2023-07-10 DIAGNOSIS — N92.1 MENOMETRORRHAGIA: ICD-10-CM

## 2023-07-10 DIAGNOSIS — Z01.818 PRE-OP EXAM: Primary | ICD-10-CM

## 2023-07-10 DIAGNOSIS — I49.3 PVC'S (PREMATURE VENTRICULAR CONTRACTIONS): ICD-10-CM

## 2023-07-10 LAB — HCG UR QL: NEGATIVE

## 2023-07-10 PROCEDURE — 81025 URINE PREGNANCY TEST: CPT | Performed by: INTERNAL MEDICINE

## 2023-07-10 PROCEDURE — 99214 OFFICE O/P EST MOD 30 MIN: CPT | Performed by: INTERNAL MEDICINE

## 2023-07-10 RX ORDER — LORATADINE 10 MG/1
10 TABLET ORAL DAILY
COMMUNITY

## 2023-07-10 RX ORDER — BUPROPION HYDROCHLORIDE 150 MG/1
150 TABLET ORAL EVERY MORNING
COMMUNITY
End: 2024-09-01

## 2023-07-10 ASSESSMENT — PATIENT HEALTH QUESTIONNAIRE - PHQ9
10. IF YOU CHECKED OFF ANY PROBLEMS, HOW DIFFICULT HAVE THESE PROBLEMS MADE IT FOR YOU TO DO YOUR WORK, TAKE CARE OF THINGS AT HOME, OR GET ALONG WITH OTHER PEOPLE: SOMEWHAT DIFFICULT
SUM OF ALL RESPONSES TO PHQ QUESTIONS 1-9: 6
SUM OF ALL RESPONSES TO PHQ QUESTIONS 1-9: 6

## 2023-07-10 NOTE — PATIENT INSTRUCTIONS
UPT today.    No ibuprofen for the day prior to surgery.     Evisit for palpitations if still bothersome with cutting back on coffee.

## 2023-07-10 NOTE — PROGRESS NOTES
[facility]  [address]  [address]  [place] [address]  Phone: [phone]  Fax: [phone]  Primary Provider: Brandee Resendez  Pre-op Performing Provider: BRANDEE RESENDEZ      PREOPERATIVE EVALUATION:  Today's date: 7/10/2023    Yvonne Diez is a 42 year old female who presents for a preoperative evaluation.      7/10/2023     9:03 AM   Additional Questions   Roomed by Letty GAMBOA   Accompanied by Self         7/10/2023     9:03 AM   Patient Reported Additional Medications   Patient reports taking the following new medications None     Surgical Information:  Surgery/Procedure: ROBOTIC ASSISTED EXCISION OF ENDOMETRIOSIS, hysteroscopy with possible D&C/Polypectomy/Myomectomy, placement of mirena IUD, cystoscopy  Surgery Location: [facility]  Surgeon: Dr.Eric Galvan   Surgery Date: 07/20/2023  Time of Surgery: 1:06 PM  Where patient plans to recover: At home with family  Fax number for surgical facility: [phone]    Assessment & Plan     The proposed surgical procedure is considered INTERMEDIATE risk.    Pre-op exam  Menometrorrhagia  Patient medically optimized to proceed with surgery. Medication management as below.   - HCG Qual, Urine ([id number]); Future    PVC's (premature ventricular contractions)  Occasional. Anticipate patient's intermittent palpitations likely due to PVCs as these are what is being captured on her watch. Worse with caffeine, so will work on restricting caffeine intake and staying hydrated. She will hold off on labs today, but may message in future for labs and Holter if still bothersome with these interventions.               - No identified additional risk factors other than previously addressed    Antiplatelet or Anticoagulation Medication Instructions:   - Patient is on no antiplatelet or anticoagulation medications.    Additional Medication Instructions:  Patient is to take all scheduled medications on the day of surgery EXCEPT  for modifications listed below:   - ibuprofen (Advil, Motrin): HOLD 1 day before surgery.     RECOMMENDATION:  APPROVAL GIVEN to proceed with proposed procedure, without further diagnostic evaluation.            Subjective       HPI related to upcoming procedure: Krishan comes in for a pre-op - she has been having irregular, heavy periods and bleeding between periods. She has a hx of endometriosis. Planning for above procedure for management.         7/10/2023     7:50 AM   Preop Questions   1. Have you ever had a heart attack or stroke? No   2. Have you ever had surgery on your heart or blood vessels, such as a stent placement, a coronary artery bypass, or surgery on an artery in your head, neck, heart, or legs? No   3. Do you have chest pain with activity? No   4. Do you have a history of  heart failure? No   5. Do you currently have a cold, bronchitis or symptoms of other infection? No   6. Do you have a cough, shortness of breath, or wheezing? No   7. Do you or anyone in your family have previous history of blood clots? YES - superficial thrombosis following pregnancy   8. Do you or does anyone in your family have a serious bleeding problem such as prolonged bleeding following surgeries or cuts? No   9. Have you ever had problems with anemia or been told to take iron pills? No   10. Have you had any abnormal blood loss such as black, tarry or bloody stools, or abnormal vaginal bleeding? YES - heavy periods, indication for surgery   11. Have you ever had a blood transfusion? No   12. Are you willing to have a blood transfusion if it is medically needed before, during, or after your surgery? Yes   13. Have you or any of your relatives ever had problems with anesthesia? No   14. Do you have sleep apnea, excessive snoring or daytime drowsiness? No   15. Do you have any artifical heart valves or other implanted medical devices like a pacemaker, defibrillator, or continuous glucose monitor? No   16. Do you have artificial  joints? No   17. Are you allergic to latex? No   18. Is there any chance that you may be pregnant? No       Health Care Directive:  Patient does not have a Health Care Directive or Living Will: Discussed advance care planning with patient; however, patient declined at this time.    Preoperative Review of :   reviewed - controlled substances prescribed by other outside provider(s).      Status of Chronic Conditions:  See problem list for active medical problems.  Problems all longstanding and stable, except as noted/documented.  See ROS for pertinent symptoms related to these conditions.      Review of Systems  +palpitations intermittently, may be associated with more caffeine use. Constitutional, neuro, ENT, endocrine, pulmonary, cardiac, gastrointestinal, genitourinary, musculoskeletal, integument and psychiatric systems are negative, except as otherwise noted.    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  2008 Abnormal pap with HPV+  2008 [place]: Unknown result   03/22/2012 NIL  02/05/2016 NIL/HPV+  02/08/2017 NIL/HPV+  02/27/2017 [place]: Benign biopsy   08/09/2018 NIL/HPV+  11/05/2018 [place]: No Biopsy   05/29/2019 NIL/HPV+  05/29/2019 [place]: ECC BERKLEY 2  06/20/2019 [facility]: BERKLEY II, Positive BERKLEY II Margin.   10/08/2019 NIL/HPV negative   06/02/2021 NIL/HPV negative     ASCCP recommends:  History of CIN2 - CIN3:  Pap and HPV every 3 years for 25 years.     Plan: Pap/HPV due 06/2024  Formatting of this note might be different from the original.  Formatting of this note might be different from the original.  2008  Abnormal pap with HPV+  2008 [place]: Unknown result   03/22/2012 NIL  02/05/2016 NIL/HPV+  02/08/2017 NIL/HPV+  02/27/2017 [place]: Benign biopsy   08/09/2018 NIL/HPV+  11/05/2018 [place]: No Biopsy   05/29/2019 NIL/HPV+  05/29/2019 [place]: ECC BERKLEY 2  06/20/2019 [facility]: BERKLEY II, Positive BERKLEY II Margin.   10/08/2019 NIL/HPV negative   06/02/2021 NIL/HPV negative     ASCCP recommends:  History of CIN2 - CIN3:  Pap and HPV every 3 years for 25 years.     Plan: Pap/HPV due 06/2024       Thrombophlebitis of superficial veins of left lower extremity 04/24/2019     Priority: Medium     Formatting of this note might be different from the original.  At 1.5 weeks postpartum, in the left medial knee, tx'd by emergency department with NSAIDs and warm compresses       Oligomenorrhea 02/12/2018     Priority: Medium     MDD (major depressive disorder), recurrent episode, moderate (H) 04/09/2015     Priority: Medium     BONNIE (generalized anxiety disorder) 04/09/2015     Priority: Medium     Heart palpitations 10/29/2014     Priority: Medium     Insomnia 06/26/2012     Priority: Medium     Recurrent major depressive disorder, in partial remission (H) 06/26/2012     Priority: Medium     Generalized anxiety disorder 03/12/2012     Priority: Medium     Diagnosis updated by automated process. Provider to review and confirm.       Endometriosis 01/04/2007     Priority: Medium     Formatting of this note might be different from the original.  Diagnosed by laparascopy  Formatting of this note might be different from the original.  Formatting of this note might be different from the original.  Diagnosed by laparascopy        Past Medical History:   Diagnosis Date     Anxiety      Depressive disorder      Endometriosis      Past Surgical History:   Procedure Laterality Date     BIOPSY       BLOOD PATCH N/A 04/26/2017    Procedure: EPIDURAL BLOOD PATCH;  EPIDURAL BLOOD PATCH;  Surgeon: GENERIC ANESTHESIA PROVIDER;  Location: [facility] OR     COLONOSCOPY       GYN  "SURGERY       [facility] LAPAROSCOPY, SURGICAL, ABDOMEN, PERITONEUM & OMENTUM; DX W/ OR W/O SPECIMEN(S)  2006    Laparoscopy, diagnostic     [facility] GENERAL SURG (ABSTRACTED)  2001    wisdom  teeth     Current Outpatient Medications   Medication Sig Dispense Refill     acetaminophen (TYLENOL) 325 MG tablet Take 325-650 mg by mouth every 6 hours as needed for mild pain       buPROPion (WELLBUTRIN XL) 150 MG 24 hr tablet Take 150 mg by mouth every morning       Calcium Carb-Cholecalciferol (CALCIUM 1000 + D PO) Take  by mouth daily.       citalopram (CELEXA) 40 MG tablet Take 40 mg by mouth       clonazePAM (KLONOPIN) 0.5 MG tablet Take 1 tablet (0.5 mg) by mouth 2 times daily as needed for anxiety 16 tablet 0     hyoscyamine (LEVSIN) 0.125 MG tablet TAKE 1 TABLET(125 MCG) BY MOUTH EVERY 4 HOURS AS NEEDED FOR CRAMPING 90 tablet 3     ibuprofen (ADVIL/MOTRIN) 200 MG capsule Take 200 mg by mouth every 4 hours as needed. 2 tablets as needed for pain       Multiple Vitamins-Minerals (MULTI MAX PO) Take  by mouth daily.       norethindrone-ethinyl estradiol (BLISOVI FE ) 1-20 MG-MCG tablet Take 1 tablet by mouth daily       traZODone (DESYREL) 50 MG tablet Take 1-2 tablets nightly as needed for insomnia         Allergies   Allergen Reactions     Sulfa Antibiotics Hives     Mother said this happened as a child        Social History     Tobacco Use     Smoking status: Former     Years: 5.00     Types: Cigarettes     Quit date: 2011     Years since quittin.6     Passive exposure: Never     Smokeless tobacco: Never     Tobacco comments:     Smoked 1 pack/week   Substance Use Topics     Alcohol use: Yes     Types: 8 Standard drinks or equivalent per week     Comment: 3 x week       History   Drug Use No         Objective     /60 (BP Location: Right arm, Patient Position: Sitting, Cuff Size: Adult Large)   Pulse 83   Resp 14   Ht 1.721 m (5' 7.75\")   Wt 96.2 kg (212 lb)   LMP  (LMP Unknown)   SpO2 99%   " BMI 32.47 kg/m      Physical Exam    GENERAL APPEARANCE: healthy, alert and no distress     EYES: EOMI, PERRL     HENT: ear canals and TM's normal and nose and mouth without ulcers or lesions     NECK: no adenopathy, no asymmetry, masses, or scars and thyroid normal to palpation     RESP: lungs clear to auscultation - no rales, rhonchi or wheezes     CV: regular rates and rhythm, normal S1 S2, no S3 or S4 and no murmur, click or rub     ABDOMEN:  soft, nontender, no HSM or masses and bowel sounds normal     MS: extremities normal- no gross deformities noted, no evidence of inflammation in joints, FROM in all extremities.     SKIN: no suspicious lesions or rashes     NEURO: Normal strength and tone, sensory exam grossly normal, mentation intact and speech normal     PSYCH: mentation appears normal. and affect normal/bright     LYMPHATICS: No cervical adenopathy    No results for input(s): HGB, PLT, INR, NA, POTASSIUM, CR, A1C in the last 77466 hours.     Diagnostics:  UPT negative.    No EKG required, no history of coronary heart disease, significant arrhythmia, peripheral arterial disease or other structural heart disease.    Revised Cardiac Risk Index (RCRI):  The patient has the following serious cardiovascular risks for perioperative complications:   - No serious cardiac risks = 0 points     RCRI Interpretation: 0 points: Class I (very low risk - 0.4% complication rate)           Signed Electronically by: Brandee Estrada MD  Copy of this evaluation report is provided to requesting physician.

## 2023-07-22 ENCOUNTER — HEALTH MAINTENANCE LETTER (OUTPATIENT)
Age: 43
End: 2023-07-22

## 2023-09-06 ENCOUNTER — TRANSFERRED RECORDS (OUTPATIENT)
Dept: HEALTH INFORMATION MANAGEMENT | Facility: CLINIC | Age: 43
End: 2023-09-06
Payer: COMMERCIAL

## 2023-09-25 ENCOUNTER — OFFICE VISIT (OUTPATIENT)
Dept: PEDIATRICS | Facility: CLINIC | Age: 43
End: 2023-09-25
Payer: COMMERCIAL

## 2023-09-25 VITALS
HEART RATE: 68 BPM | WEIGHT: 208.9 LBS | SYSTOLIC BLOOD PRESSURE: 110 MMHG | DIASTOLIC BLOOD PRESSURE: 76 MMHG | OXYGEN SATURATION: 98 % | TEMPERATURE: 97.6 F | RESPIRATION RATE: 20 BRPM | BODY MASS INDEX: 32.79 KG/M2 | HEIGHT: 67 IN

## 2023-09-25 DIAGNOSIS — S81.802A OPEN WOUND OF LEFT LOWER EXTREMITY, INITIAL ENCOUNTER: ICD-10-CM

## 2023-09-25 DIAGNOSIS — L21.9 SEBORRHEIC DERMATITIS: Primary | ICD-10-CM

## 2023-09-25 PROCEDURE — 99213 OFFICE O/P EST LOW 20 MIN: CPT | Performed by: PHYSICIAN ASSISTANT

## 2023-09-25 RX ORDER — KETOCONAZOLE 20 MG/ML
SHAMPOO TOPICAL
Qty: 120 ML | Refills: 1 | Status: SHIPPED | OUTPATIENT
Start: 2023-09-25 | End: 2024-09-01

## 2023-09-25 ASSESSMENT — PAIN SCALES - GENERAL: PAINLEVEL: MILD PAIN (2)

## 2023-09-25 ASSESSMENT — PATIENT HEALTH QUESTIONNAIRE - PHQ9
10. IF YOU CHECKED OFF ANY PROBLEMS, HOW DIFFICULT HAVE THESE PROBLEMS MADE IT FOR YOU TO DO YOUR WORK, TAKE CARE OF THINGS AT HOME, OR GET ALONG WITH OTHER PEOPLE: SOMEWHAT DIFFICULT
SUM OF ALL RESPONSES TO PHQ QUESTIONS 1-9: 9
SUM OF ALL RESPONSES TO PHQ QUESTIONS 1-9: 9

## 2023-09-25 NOTE — PROGRESS NOTES
"  Assessment & Plan     Seborrheic dermatitis  Begin shampoo as directed.  - ketoconazole (NIZORAL) 2 % external shampoo; Apply to scalp and leave on for 5 min and then rinse. Apply 2-3 x weekly x one month    Open wound of left lower extremity, initial encounter  Healing. Continue with bacitracin and bandaid.      KENROY Hu [facility] [place] is a 43 year old, presenting for the following health issues:  Cellulitis and lymph node        9/25/2023    11:15 AM   Additional Questions   Roomed by Elsa Blevins   Accompanied by N/A       History of Present Illness       Reason for visit:  Cellulitis? unilateral lympadenopathy  Symptom onset:  1-3 days ago    She eats 2-3 servings of fruits and vegetables daily.She consumes 0 sweetened beverage(s) daily.She exercises with enough effort to increase her heart rate 9 or less minutes per day.  She exercises with enough effort to increase her heart rate 3 or less days per week.   She is taking medications regularly.     Two days ago, patient noticed redness of the left upper leg. Tender. Yellow discharge. Improving. No redness streaking. No fevers, chills.     UC--folliculitis. Dry patches in the posterior scalp.     Review of Systems   Constitutional, HEENT, cardiovascular, pulmonary, gi and gu systems are negative, except as otherwise noted.      Objective    /76 (BP Location: Right arm, Patient Position: Sitting, Cuff Size: Adult Large)   Pulse 68   Temp 97.6  F (36.4  C) (Tympanic)   Resp 20   Ht 1.705 m (5' 7.13\")   Wt 94.8 kg (208 lb 14.4 oz)   SpO2 98%   BMI 32.60 kg/m    Body mass index is 32.6 kg/m .  Physical Exam   GENERAL: alert and no distress  SKIN: inspection of the left lateral leg reveals a small open site with weeping present. Localized erythema. No warmth or tenderness.   Inspection of the scalp reveals an area of erythema and scaling. Tender.   EYES: Eyes grossly normal to inspection, PERRL " and conjunctivae and sclerae normal  HENT: ear canals and TM's normal, nose and mouth without ulcers or lesions  NECK: no adenopathy, no asymmetry, masses, or scars and thyroid normal to palpation  RESP: lungs clear to auscultation - no rales, rhonchi or wheezes  CV: regular rate and rhythm, normal S1 S2, no S3 or S4, no murmur  No results found for any visits on 09/25/23.  No results found for this or any previous visit (from the past 24 hour(s)).

## 2023-11-15 ENCOUNTER — VIRTUAL VISIT (OUTPATIENT)
Dept: FAMILY MEDICINE | Facility: CLINIC | Age: 43
End: 2023-11-15
Payer: COMMERCIAL

## 2023-11-15 DIAGNOSIS — J22 ACUTE RESPIRATORY INFECTION: Primary | ICD-10-CM

## 2023-11-15 PROCEDURE — 99213 OFFICE O/P EST LOW 20 MIN: CPT | Mod: VID | Performed by: FAMILY MEDICINE

## 2023-11-15 RX ORDER — CODEINE PHOSPHATE AND GUAIFENESIN 10; 100 MG/5ML; MG/5ML
1-2 SOLUTION ORAL
Qty: 118 ML | Refills: 0 | Status: SHIPPED | OUTPATIENT
Start: 2023-11-15 | End: 2023-11-27

## 2023-11-15 RX ORDER — IPRATROPIUM BROMIDE AND ALBUTEROL SULFATE 2.5; .5 MG/3ML; MG/3ML
1 SOLUTION RESPIRATORY (INHALATION) EVERY 6 HOURS PRN
Qty: 90 ML | Refills: 0 | Status: SHIPPED | OUTPATIENT
Start: 2023-11-15 | End: 2024-04-08

## 2023-11-15 RX ORDER — ALBUTEROL SULFATE 90 UG/1
2 AEROSOL, METERED RESPIRATORY (INHALATION) EVERY 6 HOURS PRN
Qty: 18 G | Refills: 1 | Status: SHIPPED | OUTPATIENT
Start: 2023-11-15 | End: 2024-01-08

## 2023-11-15 RX ORDER — BENZONATATE 200 MG/1
200 CAPSULE ORAL 3 TIMES DAILY PRN
Qty: 30 CAPSULE | Refills: 0 | Status: SHIPPED | OUTPATIENT
Start: 2023-11-15 | End: 2024-04-08

## 2023-11-15 ASSESSMENT — PATIENT HEALTH QUESTIONNAIRE - PHQ9
SUM OF ALL RESPONSES TO PHQ QUESTIONS 1-9: 3
SUM OF ALL RESPONSES TO PHQ QUESTIONS 1-9: 3
10. IF YOU CHECKED OFF ANY PROBLEMS, HOW DIFFICULT HAVE THESE PROBLEMS MADE IT FOR YOU TO DO YOUR WORK, TAKE CARE OF THINGS AT HOME, OR GET ALONG WITH OTHER PEOPLE: SOMEWHAT DIFFICULT

## 2023-11-15 ASSESSMENT — ENCOUNTER SYMPTOMS: COUGH: 1

## 2023-11-15 NOTE — PROGRESS NOTES
"Krishan is a 43 year old who is being evaluated via a billable video visit.      How would you like to obtain your AVS? MyChart  If the video visit is dropped, the invitation should be resent by: Text to cell phone: [phone]  Will anyone else be joining your video visit? No          Assessment & Plan     Acute respiratory infection  Likely viral upper respiratory infection. Treat symptomatically -- use cough suppressant, inahler/neb as needed. Follow up in clinic if symptoms still not improving.  - benzonatate (TESSALON) 200 MG capsule; Take 1 capsule (200 mg) by mouth 3 times daily as needed  - albuterol (PROAIR HFA/PROVENTIL HFA/VENTOLIN HFA) 108 (90 Base) MCG/ACT inhaler; Inhale 2 puffs into the lungs every 6 hours as needed  - ipratropium - albuterol 0.5 mg/2.5 mg/3 mL (DUONEB) 0.5-2.5 (3) MG/3ML neb solution; Take 1 vial (3 mLs) by nebulization every 6 hours as needed for shortness of breath, wheezing or cough             BMI:   Estimated body mass index is 32.6 kg/m  as calculated from the following:    Height as of 9/25/23: 1.705 m (5' 7.13\").    Weight as of 9/25/23: 94.8 kg (208 lb 14.4 oz).         Bang Tomas [facility] [place]    Subjective   Krishan is a 43 year old, presenting for the following health issues:  Cough (Tightness in chest) and URI        11/15/2023     8:19 AM   Additional Questions   Roomed by Dominique ABARCA CMA       History of Present Illness       Reason for visit:  Incessant, productive cough with chest tightness. Unable to sleep dt coughing Pounding headache. Low grade fever (100.5). Fatigue, malaise. Negative covid. Symptoms worsening since saturday  Symptom onset:  3-7 days ago  Symptoms include:  See above  Symptom intensity:  Severe  Symptom progression:  Worsening  Had these symptoms before:  Yes  Has tried/received treatment for these symptoms:  Yes  Previous treatment was successful:  Yes  Prior treatment description:  Wilder davis, 3% saline nebs, " antiobiotics if needed, vicodin or codeine cough syrup  What makes it worse:  Lying flat, walking up the stairs, working  What makes it better:  Mucinex, tesslon pearls, robitussin, cold/flu meds (nyquil, mucinex cold, advil Cold and sinus )    She eats 2-3 servings of fruits and vegetables daily.She consumes 0 sweetened beverage(s) daily.She exercises with enough effort to increase her heart rate 10 to 19 minutes per day.  She exercises with enough effort to increase her heart rate 3 or less days per week.   She is taking medications regularly.  92 pulse ox    Review of Systems   Respiratory:  Positive for cough.       Constitutional, HEENT, cardiovascular, pulmonary, gi and gu systems are negative, except as otherwise noted.      Objective           Vitals:  No vitals were obtained today due to virtual visit.    Physical Exam   GENERAL: Healthy, alert and no distress  EYES: Eyes grossly normal to inspection.  No discharge or erythema, or obvious scleral/conjunctival abnormalities.  RESP: No audible wheeze, cough, or visible cyanosis.  No visible retractions or increased work of breathing.    SKIN: Visible skin clear. No significant rash, abnormal pigmentation or lesions.  NEURO: Cranial nerves grossly intact.  Mentation and speech appropriate for age.  PSYCH: Mentation appears normal, affect normal/bright, judgement and insight intact, normal speech and appearance well-groomed.                Video-Visit Details    Type of service:  Video Visit     Originating Location (pt. Location): Home    Distant Location (provider location):  On-site  Platform used for Video Visit: [employer]

## 2023-11-21 ENCOUNTER — MYC MEDICAL ADVICE (OUTPATIENT)
Dept: FAMILY MEDICINE | Facility: CLINIC | Age: 43
End: 2023-11-21
Payer: COMMERCIAL

## 2023-11-21 DIAGNOSIS — J22 ACUTE RESPIRATORY INFECTION: ICD-10-CM

## 2023-11-21 RX ORDER — AZITHROMYCIN 250 MG/1
TABLET, FILM COATED ORAL
Qty: 6 TABLET | Refills: 0 | Status: SHIPPED | OUTPATIENT
Start: 2023-11-21 | End: 2024-04-08

## 2023-11-21 NOTE — TELEPHONE ENCOUNTER
Pt calling asking about this and if this can be done as she is miserable   Huddled with ZULLYNP - she advise pt be seen in clinic and do home cares until seen.   RN advised on the information above to pt     Pt asked that we send message to MD SINGH for review and she will continue home cares     Please advise     Thank you     Nazia Tena RN, BSN  [facility]

## 2023-11-22 NOTE — TELEPHONE ENCOUNTER
Rx sent for Azithromycin to treat for potential bacterial etiology. The cough syrup should be enough for up to 11 days as it is written -- 5-10 mL by mouth nightly as needed. Has she been taking more than what was prescribed?    Bang Tomas,   11/21/2023 11:46 PM

## 2023-11-24 NOTE — TELEPHONE ENCOUNTER
Please see my chart message below     Please review and advise     Thank you     Nazia Tena RN, BSN  [place] [facility]

## 2023-11-27 RX ORDER — CODEINE PHOSPHATE AND GUAIFENESIN 10; 100 MG/5ML; MG/5ML
1-2 SOLUTION ORAL
Qty: 118 ML | Refills: 0 | Status: SHIPPED | OUTPATIENT
Start: 2023-11-27 | End: 2024-04-08

## 2023-11-27 NOTE — TELEPHONE ENCOUNTER
Okay for refill of cough medication. If still having significant cough, would recommend in-person follow up appointment.    Bang Tomas DO  11/27/2023 12:26 AM

## 2024-01-07 DIAGNOSIS — J22 ACUTE RESPIRATORY INFECTION: ICD-10-CM

## 2024-01-08 RX ORDER — ALBUTEROL SULFATE 90 UG/1
2 AEROSOL, METERED RESPIRATORY (INHALATION) EVERY 6 HOURS PRN
Qty: 8.5 G | Refills: 1 | Status: SHIPPED | OUTPATIENT
Start: 2024-01-08 | End: 2024-04-08

## 2024-03-19 ENCOUNTER — VIRTUAL VISIT (OUTPATIENT)
Dept: PEDIATRICS | Facility: CLINIC | Age: 44
End: 2024-03-19
Payer: COMMERCIAL

## 2024-03-19 DIAGNOSIS — J01.80 ACUTE NON-RECURRENT SINUSITIS OF OTHER SINUS: Primary | ICD-10-CM

## 2024-03-19 PROCEDURE — 99213 OFFICE O/P EST LOW 20 MIN: CPT | Mod: 95 | Performed by: PHYSICIAN ASSISTANT

## 2024-03-19 RX ORDER — FLUCONAZOLE 150 MG/1
150 TABLET ORAL ONCE
Qty: 1 TABLET | Refills: 0 | Status: SHIPPED | OUTPATIENT
Start: 2024-03-19 | End: 2024-03-19

## 2024-03-19 NOTE — PROGRESS NOTES
Krishan is a 43 year old who is being evaluated via a billable video visit.      Assessment & Plan     Acute non-recurrent sinusitis of other sinus  Begin antibiotics as directed.   Diflucan if develops yeast.  - amoxicillin-clavulanate (AUGMENTIN) 875-125 MG tablet; Take 1 tablet by mouth 2 times daily  - fluconazole (DIFLUCAN) 150 MG tablet; Take 1 tablet (150 mg) by mouth once for 1 dose    Subjective   Krishan is a 43 year old, presenting for the following health issues:    URI symptoms 7 days. Low grade fevers. Sinus symptoms, ear pain, PND, ST, headache. Cough intermittent and from PND. Nausea and diarrhea yesterday. No body aches.     No exposure to influenza.       Review of Systems  Constitutional, HEENT, cardiovascular, pulmonary, gi and gu systems are negative, except as otherwise noted.      Objective       Vitals:  No vitals were obtained today due to virtual visit.    Physical Exam   GENERAL: alert and no distress  EYES: Eyes grossly normal to inspection.  No discharge or erythema, or obvious scleral/conjunctival abnormalities.  RESP: No audible wheeze, cough, or visible cyanosis.    SKIN: Visible skin clear. No significant rash, abnormal pigmentation or lesions.  NEURO: Cranial nerves grossly intact.  Mentation and speech appropriate for age.  PSYCH: Appropriate affect, tone, and pace of words      Video-Visit Details    Type of service:  Video Visit   Originating Location (pt. Location): Home    Distant Location (provider location):  On-site  Platform used for Video Visit: [employer]  Signed Electronically by: Guilherme Landry PA-C

## 2024-04-08 ENCOUNTER — NURSE TRIAGE (OUTPATIENT)
Dept: NURSING | Facility: CLINIC | Age: 44
End: 2024-04-08

## 2024-04-08 ENCOUNTER — OFFICE VISIT (OUTPATIENT)
Dept: FAMILY MEDICINE | Facility: CLINIC | Age: 44
End: 2024-04-08
Payer: COMMERCIAL

## 2024-04-08 VITALS
DIASTOLIC BLOOD PRESSURE: 84 MMHG | BODY MASS INDEX: 33.65 KG/M2 | TEMPERATURE: 98 F | HEART RATE: 65 BPM | RESPIRATION RATE: 16 BRPM | HEIGHT: 68 IN | OXYGEN SATURATION: 100 % | SYSTOLIC BLOOD PRESSURE: 118 MMHG | WEIGHT: 222 LBS

## 2024-04-08 DIAGNOSIS — M26.622 ARTHRALGIA OF LEFT TEMPOROMANDIBULAR JOINT: Primary | ICD-10-CM

## 2024-04-08 PROCEDURE — 99213 OFFICE O/P EST LOW 20 MIN: CPT | Performed by: FAMILY MEDICINE

## 2024-04-08 RX ORDER — DULOXETIN HYDROCHLORIDE 60 MG/1
60 CAPSULE, DELAYED RELEASE ORAL AT BEDTIME
COMMUNITY
Start: 2023-12-15

## 2024-04-08 RX ORDER — CYCLOBENZAPRINE HCL 10 MG
10 TABLET ORAL
Qty: 30 TABLET | Refills: 0 | Status: SHIPPED | OUTPATIENT
Start: 2024-04-08 | End: 2024-09-01

## 2024-04-08 RX ORDER — OXYCODONE AND ACETAMINOPHEN 5; 325 MG/1; MG/1
1 TABLET ORAL EVERY 6 HOURS PRN
Qty: 10 TABLET | Refills: 0 | Status: SHIPPED | OUTPATIENT
Start: 2024-04-08 | End: 2024-04-18

## 2024-04-08 ASSESSMENT — PAIN SCALES - GENERAL: PAINLEVEL: MODERATE PAIN (4)

## 2024-04-08 NOTE — TELEPHONE ENCOUNTER
Nurse Triage SBAR    Situation: Prescription question.     Background:   -Patient calling  -It is okay to call back and leave a detailed message at this number:    Assessment: Pt was seen in [facility] today and has a prescription question. She stated that two medications were prescribed for her, but pharmacy telling her they did not receive anything.     This writer spoke with pharmacist at [facility] in [place].  Order for Cyclobenzaprine is currently being filled. Percocet order has not been received yet by pharmacy, but does appear to have been sent to pharmacy.     Pharmacist stated there is a problem with their system and they have been slow in receiving prescriptions this evening. Stated pt should call them back in 30 minutes to see if prescription has been received.     Patient stated she is agreeable with plan to call pharmacy back later. Will call triage again if she is unable to obtain medications.        Reason for Disposition   [1] Follow-up call to recent contact AND [2] information only call, no triage required    Protocols used: Information Only Call - No Triage-A-

## 2024-04-08 NOTE — PROGRESS NOTES
"  Assessment & Plan     (M26.622) Arthralgia of left temporomandibular joint  (primary encounter diagnosis)  Comment: pt has left tmj pain for 2 days, moderate to severe. Open mouth hurts, she feels tmj pops. She tried hot and cold compress alterate, tylenol and motrin alterate, avoid chewing with liquid to soft food. No fever, tmj injury, headache, sore throat. But she has been stress at school. She sleeps ok. Pt has recurrent bilateral tmj pain.   Exam: mild right tmj tenderness. Moderate left tmj tenderness.   Plan: cyclobenzaprine (FLEXERIL) 10 MG tablet,         Physical Therapy  Referral,         oxyCODONE-acetaminophen (PERCOCET) 5-325 MG         tablet        Advise rest, avoid chew and relax. . Otc tylenol or motrin prn. Ice or hot compress. Will have physical therapy for tmj pain. Advise follow-up with dentist for possible mouth guard. Follow-up in pain worse.         BMI  Estimated body mass index is 34.26 kg/m  as calculated from the following:    Height as of this encounter: 1.715 m (5' 7.5\").    Weight as of this encounter: 100.7 kg (222 lb).         See Patient Instructions    Subjective   Krishan is a 43 year old, presenting for the following health issues:  Jaw Pain (Left side jaw can't eat, can't lay on side at all, hx of TMJ , )      4/8/2024     4:36 PM   Additional Questions   Roomed by dani opwell cma     History of Present Illness       Reason for visit:  Searing pain in left jaw since saturday with popping/clicking (hx of TMJ)  Symptom onset:  1-3 days ago  Symptoms include:  See above. Also, residual pain/fatigue/soreness in right jaw  Symptom intensity:  Severe  Symptom progression:  Worsening  Had these symptoms before:  No  What makes it worse:  Eating, talking, lying on either side  What makes it better:  Ice, heat, acetominophen, ibuprofen has very little, temporary relief    She eats 2-3 servings of fruits and vegetables daily.She consumes 0 sweetened beverage(s) daily.She exercises " "with enough effort to increase her heart rate 9 or less minutes per day.  She exercises with enough effort to increase her heart rate 3 or less days per week.   She is taking medications regularly.            Review of Systems  Constitutional, HEENT, cardiovascular, pulmonary, gi and gu systems are negative, except as otherwise noted.      Objective    /84   Pulse 65   Temp 98  F (36.7  C)   Resp 16   Ht 1.715 m (5' 7.5\")   Wt 100.7 kg (222 lb)   LMP  (LMP Unknown)   SpO2 100%   BMI 34.26 kg/m    Body mass index is 34.26 kg/m .  Physical Exam   GENERAL: alert and no distress  HENT: ear canals and TM's normal, nose and mouth without ulcers or lesions. Bilateral tmj tenderness, right mild and left moderate. No swelling, erythema and deformity.   NECK: no adenopathy, no asymmetry, masses, or scars  RESP: lungs clear to auscultation - no rales, rhonchi or wheezes  CV: regular rate and rhythm, normal S1 S2, no S3 or S4, no murmur, click or rub, no peripheral edema  MS: no gross musculoskeletal defects noted, no edema       Signed Electronically by: Jennifer Nieves MD    "

## 2024-05-07 ENCOUNTER — E-VISIT (OUTPATIENT)
Dept: PEDIATRICS | Facility: CLINIC | Age: 44
End: 2024-05-07
Payer: COMMERCIAL

## 2024-05-07 DIAGNOSIS — Z11.1 SCREENING FOR TUBERCULOSIS: Primary | ICD-10-CM

## 2024-05-07 PROCEDURE — 99421 OL DIG E/M SVC 5-10 MIN: CPT | Performed by: PHYSICIAN ASSISTANT

## 2024-05-09 ENCOUNTER — LAB (OUTPATIENT)
Dept: LAB | Facility: CLINIC | Age: 44
End: 2024-05-09
Payer: COMMERCIAL

## 2024-05-09 DIAGNOSIS — F41.1 GENERALIZED ANXIETY DISORDER: Primary | ICD-10-CM

## 2024-05-09 DIAGNOSIS — Z11.1 SCREENING FOR TUBERCULOSIS: ICD-10-CM

## 2024-05-09 PROCEDURE — 36415 COLL VENOUS BLD VENIPUNCTURE: CPT

## 2024-05-09 PROCEDURE — 86481 TB AG RESPONSE T-CELL SUSP: CPT

## 2024-05-09 PROCEDURE — 82947 ASSAY GLUCOSE BLOOD QUANT: CPT

## 2024-05-09 PROCEDURE — 80061 LIPID PANEL: CPT

## 2024-05-10 LAB
CHOLEST SERPL-MCNC: 225 MG/DL
FASTING STATUS PATIENT QL REPORTED: ABNORMAL
FASTING STATUS PATIENT QL REPORTED: ABNORMAL
GLUCOSE SERPL-MCNC: 104 MG/DL (ref 70–99)
HDLC SERPL-MCNC: 76 MG/DL
LDLC SERPL CALC-MCNC: 122 MG/DL
NONHDLC SERPL-MCNC: 149 MG/DL
TRIGL SERPL-MCNC: 134 MG/DL

## 2024-05-13 LAB
GAMMA INTERFERON BACKGROUND BLD IA-ACNC: 0 IU/ML
M TB IFN-G BLD-IMP: NEGATIVE
M TB IFN-G CD4+ BCKGRND COR BLD-ACNC: 10 IU/ML
MITOGEN IGNF BCKGRD COR BLD-ACNC: 0 IU/ML
MITOGEN IGNF BCKGRD COR BLD-ACNC: 0 IU/ML
QUANTIFERON MITOGEN: 10 IU/ML
QUANTIFERON NIL TUBE: 0 IU/ML
QUANTIFERON TB1 TUBE: 0 IU/ML
QUANTIFERON TB2 TUBE: 0

## 2024-07-11 ENCOUNTER — MYC MEDICAL ADVICE (OUTPATIENT)
Dept: PEDIATRICS | Facility: CLINIC | Age: 44
End: 2024-07-11
Payer: COMMERCIAL

## 2024-07-11 DIAGNOSIS — R19.5 CHANGE IN STOOL CALIBER: ICD-10-CM

## 2024-07-11 DIAGNOSIS — K21.00 GASTROESOPHAGEAL REFLUX DISEASE WITH ESOPHAGITIS, UNSPECIFIED WHETHER HEMORRHAGE: Primary | ICD-10-CM

## 2024-07-12 NOTE — TELEPHONE ENCOUNTER
FYI for future reference - this would be a great option for an Evisit. Thanks!    Brandee Harper MD  Internal Medicine-Pediatrics

## 2024-07-12 NOTE — TELEPHONE ENCOUNTER
Dr. Harper,    Please see [facility] message from pt    Thank you  Francisco Alba RN on 7/12/2024 at 7:33 AM

## 2024-07-17 NOTE — TELEPHONE ENCOUNTER
Endoscopy and colonoscopy ordered, can we fax referral to [facility]? Thanks!    Brandee Harper MD  Internal Medicine-Pediatrics

## 2024-07-17 NOTE — TELEPHONE ENCOUNTER
-Faxed referral to [facility] at [phone]    Thank you kindly,  Neville PEREIRA

## 2024-07-18 ENCOUNTER — HOSPITAL ENCOUNTER (EMERGENCY)
Facility: CLINIC | Age: 44
Discharge: HOME | End: 2024-07-18
Attending: PHYSICIAN ASSISTANT | Admitting: PHYSICIAN ASSISTANT
Payer: COMMERCIAL

## 2024-07-18 ENCOUNTER — APPOINTMENT (OUTPATIENT)
Dept: CT IMAGING | Facility: CLINIC | Age: 44
End: 2024-07-18
Attending: PHYSICIAN ASSISTANT
Payer: COMMERCIAL

## 2024-07-18 VITALS
TEMPERATURE: 97.1 F | BODY MASS INDEX: 32.81 KG/M2 | DIASTOLIC BLOOD PRESSURE: 101 MMHG | RESPIRATION RATE: 16 BRPM | WEIGHT: 216.49 LBS | HEART RATE: 64 BPM | OXYGEN SATURATION: 96 % | HEIGHT: 68 IN | SYSTOLIC BLOOD PRESSURE: 148 MMHG

## 2024-07-18 DIAGNOSIS — K59.00 CONSTIPATION: ICD-10-CM

## 2024-07-18 DIAGNOSIS — N83.201 RIGHT OVARIAN CYST: ICD-10-CM

## 2024-07-18 DIAGNOSIS — N80.9 ENDOMETRIOSIS: ICD-10-CM

## 2024-07-18 LAB
ALBUMIN SERPL BCG-MCNC: 4.4 G/DL (ref 3.5–5.2)
ALBUMIN UR-MCNC: NEGATIVE MG/DL
ALP SERPL-CCNC: 68 U/L (ref 40–150)
ALT SERPL W P-5'-P-CCNC: 32 U/L (ref 0–50)
ANION GAP SERPL CALCULATED.3IONS-SCNC: 11 MMOL/L (ref 7–15)
APPEARANCE UR: CLEAR
AST SERPL W P-5'-P-CCNC: 29 U/L (ref 0–45)
BACTERIA #/AREA URNS HPF: ABNORMAL /HPF
BASOPHILS # BLD AUTO: 0.1 10E3/UL (ref 0–0.2)
BASOPHILS NFR BLD AUTO: 1 %
BILIRUB SERPL-MCNC: 0.5 MG/DL
BILIRUB UR QL STRIP: NEGATIVE
BUN SERPL-MCNC: 6.1 MG/DL (ref 6–20)
CALCIUM SERPL-MCNC: 9.7 MG/DL (ref 8.8–10.4)
CHLORIDE SERPL-SCNC: 102 MMOL/L (ref 98–107)
COLOR UR AUTO: ABNORMAL
CREAT SERPL-MCNC: 0.86 MG/DL (ref 0.51–0.95)
EGFRCR SERPLBLD CKD-EPI 2021: 87 ML/MIN/1.73M2
EOSINOPHIL # BLD AUTO: 0.2 10E3/UL (ref 0–0.7)
EOSINOPHIL NFR BLD AUTO: 4 %
ERYTHROCYTE [DISTWIDTH] IN BLOOD BY AUTOMATED COUNT: 12.4 % (ref 10–15)
GLUCOSE SERPL-MCNC: 95 MG/DL (ref 70–99)
GLUCOSE UR STRIP-MCNC: NEGATIVE MG/DL
HCG SERPL QL: NEGATIVE
HCO3 SERPL-SCNC: 25 MMOL/L (ref 22–29)
HCT VFR BLD AUTO: 39.4 % (ref 35–47)
HGB BLD-MCNC: 13.4 G/DL (ref 11.7–15.7)
HGB UR QL STRIP: NEGATIVE
HOLD SPECIMEN: NORMAL
IMM GRANULOCYTES # BLD: 0 10E3/UL
IMM GRANULOCYTES NFR BLD: 0 %
KETONES UR STRIP-MCNC: NEGATIVE MG/DL
LEUKOCYTE ESTERASE UR QL STRIP: NEGATIVE
LYMPHOCYTES # BLD AUTO: 2.6 10E3/UL (ref 0.8–5.3)
LYMPHOCYTES NFR BLD AUTO: 40 %
MCH RBC QN AUTO: 32.3 PG (ref 26.5–33)
MCHC RBC AUTO-ENTMCNC: 34 G/DL (ref 31.5–36.5)
MCV RBC AUTO: 95 FL (ref 78–100)
MONOCYTES # BLD AUTO: 0.6 10E3/UL (ref 0–1.3)
MONOCYTES NFR BLD AUTO: 9 %
NEUTROPHILS # BLD AUTO: 3 10E3/UL (ref 1.6–8.3)
NEUTROPHILS NFR BLD AUTO: 47 %
NITRATE UR QL: NEGATIVE
NRBC # BLD AUTO: 0 10E3/UL
NRBC BLD AUTO-RTO: 0 /100
PH UR STRIP: 6 [PH] (ref 5–7)
PLATELET # BLD AUTO: 311 10E3/UL (ref 150–450)
POTASSIUM SERPL-SCNC: 4.5 MMOL/L (ref 3.4–5.3)
PROT SERPL-MCNC: 7.6 G/DL (ref 6.4–8.3)
RBC # BLD AUTO: 4.15 10E6/UL (ref 3.8–5.2)
RBC URINE: 0 /HPF
SODIUM SERPL-SCNC: 138 MMOL/L (ref 135–145)
SP GR UR STRIP: 1.01 (ref 1–1.03)
UROBILINOGEN UR STRIP-MCNC: NORMAL MG/DL
WBC # BLD AUTO: 6.5 10E3/UL (ref 4–11)
WBC URINE: 2 /HPF

## 2024-07-18 PROCEDURE — 250N000009 HC RX 250: Performed by: PHYSICIAN ASSISTANT

## 2024-07-18 PROCEDURE — 84703 CHORIONIC GONADOTROPIN ASSAY: CPT | Performed by: PHYSICIAN ASSISTANT

## 2024-07-18 PROCEDURE — 250N000011 HC RX IP 250 OP 636: Performed by: PHYSICIAN ASSISTANT

## 2024-07-18 PROCEDURE — 36415 COLL VENOUS BLD VENIPUNCTURE: CPT | Performed by: PHYSICIAN ASSISTANT

## 2024-07-18 PROCEDURE — 258N000003 HC RX IP 258 OP 636: Performed by: PHYSICIAN ASSISTANT

## 2024-07-18 PROCEDURE — 96376 TX/PRO/DX INJ SAME DRUG ADON: CPT

## 2024-07-18 PROCEDURE — 74177 CT ABD & PELVIS W/CONTRAST: CPT

## 2024-07-18 PROCEDURE — 80053 COMPREHEN METABOLIC PANEL: CPT | Performed by: PHYSICIAN ASSISTANT

## 2024-07-18 PROCEDURE — 81001 URINALYSIS AUTO W/SCOPE: CPT | Performed by: PHYSICIAN ASSISTANT

## 2024-07-18 PROCEDURE — 96361 HYDRATE IV INFUSION ADD-ON: CPT

## 2024-07-18 PROCEDURE — 96374 THER/PROPH/DIAG INJ IV PUSH: CPT | Mod: 59

## 2024-07-18 PROCEDURE — 99285 EMERGENCY DEPT VISIT HI MDM: CPT | Mod: 25

## 2024-07-18 PROCEDURE — 85025 COMPLETE CBC W/AUTO DIFF WBC: CPT | Performed by: PHYSICIAN ASSISTANT

## 2024-07-18 RX ORDER — HYDROMORPHONE HYDROCHLORIDE 1 MG/ML
0.5 INJECTION, SOLUTION INTRAMUSCULAR; INTRAVENOUS; SUBCUTANEOUS ONCE
Status: COMPLETED | OUTPATIENT
Start: 2024-07-18 | End: 2024-07-18

## 2024-07-18 RX ORDER — HYDROMORPHONE HYDROCHLORIDE 1 MG/ML
0.5 INJECTION, SOLUTION INTRAMUSCULAR; INTRAVENOUS; SUBCUTANEOUS
Status: COMPLETED | OUTPATIENT
Start: 2024-07-18 | End: 2024-07-18

## 2024-07-18 RX ORDER — IOPAMIDOL 755 MG/ML
500 INJECTION, SOLUTION INTRAVASCULAR ONCE
Status: COMPLETED | OUTPATIENT
Start: 2024-07-18 | End: 2024-07-18

## 2024-07-18 RX ORDER — OXYCODONE HYDROCHLORIDE 5 MG/1
5 TABLET ORAL EVERY 6 HOURS PRN
Qty: 10 TABLET | Refills: 0 | Status: SHIPPED | OUTPATIENT
Start: 2024-07-18 | End: 2024-08-29

## 2024-07-18 RX ADMIN — IOPAMIDOL 100 ML: 755 INJECTION, SOLUTION INTRAVENOUS at 20:54

## 2024-07-18 RX ADMIN — SODIUM CHLORIDE 65 ML: 9 INJECTION, SOLUTION INTRAVENOUS at 20:54

## 2024-07-18 RX ADMIN — HYDROMORPHONE HYDROCHLORIDE 0.5 MG: 1 INJECTION, SOLUTION INTRAMUSCULAR; INTRAVENOUS; SUBCUTANEOUS at 20:03

## 2024-07-18 RX ADMIN — HYDROMORPHONE HYDROCHLORIDE 0.5 MG: 1 INJECTION, SOLUTION INTRAMUSCULAR; INTRAVENOUS; SUBCUTANEOUS at 21:30

## 2024-07-18 RX ADMIN — HYDROMORPHONE HYDROCHLORIDE 0.5 MG: 1 INJECTION, SOLUTION INTRAMUSCULAR; INTRAVENOUS; SUBCUTANEOUS at 19:15

## 2024-07-18 RX ADMIN — SODIUM CHLORIDE 1000 ML: 9 INJECTION, SOLUTION INTRAVENOUS at 19:06

## 2024-07-18 ASSESSMENT — ACTIVITIES OF DAILY LIVING (ADL)
ADLS_ACUITY_SCORE: 35

## 2024-07-18 ASSESSMENT — COLUMBIA-SUICIDE SEVERITY RATING SCALE - C-SSRS
1. IN THE PAST MONTH, HAVE YOU WISHED YOU WERE DEAD OR WISHED YOU COULD GO TO SLEEP AND NOT WAKE UP?: NO
6. HAVE YOU EVER DONE ANYTHING, STARTED TO DO ANYTHING, OR PREPARED TO DO ANYTHING TO END YOUR LIFE?: NO
2. HAVE YOU ACTUALLY HAD ANY THOUGHTS OF KILLING YOURSELF IN THE PAST MONTH?: NO

## 2024-07-18 NOTE — ED PROVIDER NOTES
Emergency Department Note      History of Present Illness     Chief Complaint   Abdominal pain, constipation      HPI   Yvonne Diez is a 43 year old female with a history of endometriosis c-sections, ovarian cysts, adhesions who presents to the emergency department for evaluation of pelvic pain. The patient reports that for the past month she has had pelvic pain and states that she has had only 1 bowel movement this last week. She states that this pelvic pain is bilateral and radiates to both flanks. She reports that ibuprofen and tylenol have provided no relief, with last dose of Advil two hours ago. No nausea or vomiting sometimes feels nauseaus due to pain. The patient states that she had a peritoneal stripping a year ago and this relieved her symptoms but they have now come back. She states that she had a pelvic ultrasound earlier today as well. She also reports a history of 2 C sections and 2 laparoscopic ablations. She denies any fever, urinary symptoms, or vaginal discharge. Picked up mag citrate but hasn't taken yet.  Difficulty managing sx w/otc analgesics thus far today.  No vaginal itching/discharge or STI concern.    Independent Historian   None    Review of External Notes   Reviewed Dr. Dalila davis note from 6/17 and hx endometriosis.  Reviewed pelvic US performed today which showed BL ovarian cysts w/out other concerning findings or abnormal color.    Past Medical History     Medical History and Problem List   Anxiety  Depressive disorder  Endometriosis  Insomnia  Bilateral retinal lattice degeneration  BERKLEY II  Heart palpitations  Myopia  Regular astigmatism  Oligomenorrhea  Thrombophlebitis of superficial veins of left lower extremity  Anorexia nervosa  Chickenpox  Gastric ulcer  Sciatic nerve injury    Medications   Wellbutrin  Klonopin  Flexeril  Cymbalta  Levsin  Nizoral  Claritin  Desyrel  Prilosec  Inderal  Roxicodone  Prometrium  Tessalon  Lexapro  Albuterol    Surgical  "History   Biopsy  Blood patch  GYN surgery  [place] teeth extraction  C Section  Foot surgery  LEEP procedure  Endometriosis fulguration    Physical Exam     Patient Vitals for the past 24 hrs:   BP Temp Pulse Resp SpO2 Height Weight   07/18/24 1834 (!) 148/101 97.1  F (36.2  C) 64 16 96 % 1.727 m (5' 8\") 98.2 kg (216 lb 7.9 oz)     Physical Exam  General: Awake, alert, non-toxic.  Head:  Scalp is NC/AT  Eyes:  Conjunctiva normal  ENT:  The external nose and ears are normal.   Neck:  Normal range of motion without rigidity.  CV:  Regular rate and rhythm    No pathologic murmur, rubs, or gallops.  Resp:  Breath sounds are clear bilaterally    Non-labored, no retractions or accessory muscle use  Abdomen: Abdomen is soft, no distension, mild BL lower abdominal tenderness, no masses. No CVA tenderness.  MS:  No lower extremity edema/swelling.   Skin:  Warm and dry, No rash or lesions noted.  Neuro:  Alert and oriented.  GCS 15 Moves all extremities normal.  No facial asymmetry. Gait normal.  Psych:  Awake. Alert. Normal affect. Appropriate interactions.      Diagnostics     Lab Results   Labs Ordered and Resulted from Time of ED Arrival to Time of ED Departure   ROUTINE UA WITH MICROSCOPIC REFLEX TO CULTURE - Abnormal       Result Value    Color Urine Light Yellow      Appearance Urine Clear      Glucose Urine Negative      Bilirubin Urine Negative      Ketones Urine Negative      Specific Gravity Urine 1.015      Blood Urine Negative      pH Urine 6.0      Protein Albumin Urine Negative      Urobilinogen Urine Normal      Nitrite Urine Negative      Leukocyte Esterase Urine Negative      Bacteria Urine Few (*)     RBC Urine 0      WBC Urine 2     COMPREHENSIVE METABOLIC PANEL - Normal    Sodium 138      Potassium 4.5      Carbon Dioxide (CO2) 25      Anion Gap 11      Urea Nitrogen 6.1      Creatinine 0.86      GFR Estimate 87      Calcium 9.7      Chloride 102      Glucose 95      Alkaline Phosphatase 68      AST 29   "    ALT 32      Protein Total 7.6      Albumin 4.4      Bilirubin Total 0.5     HCG QUALITATIVE PREGNANCY - Normal    hCG Serum Qualitative Negative     CBC WITH PLATELETS AND DIFFERENTIAL    WBC Count 6.5      RBC Count 4.15      Hemoglobin 13.4      Hematocrit 39.4      MCV 95      MCH 32.3      MCHC 34.0      RDW 12.4      Platelet Count 311      % Neutrophils 47      % Lymphocytes 40      % Monocytes 9      % Eosinophils 4      % Basophils 1      % Immature Granulocytes 0      NRBCs per 100 WBC 0      Absolute Neutrophils 3.0      Absolute Lymphocytes 2.6      Absolute Monocytes 0.6      Absolute Eosinophils 0.2      Absolute Basophils 0.1      Absolute Immature Granulocytes 0.0      Absolute NRBCs 0.0         Imaging   CT Abdomen Pelvis w Contrast   Final Result   IMPRESSION:    1.  5.2 cm right ovarian cyst. Further evaluation with ultrasound recommended.   2.  Moderately large fecal burden.   3.  No CT evidence of appendicitis.          EKG   None    Independent Interpretation   None    ED Course      Medications Administered   Medications   sodium chloride 0.9% BOLUS 1,000 mL (0 mLs Intravenous Stopped 7/18/24 2123)   HYDROmorphone (PF) (DILAUDID) injection 0.5 mg (0.5 mg Intravenous $Given 7/18/24 1915)   HYDROmorphone (PF) (DILAUDID) injection 0.5 mg (0.5 mg Intravenous $Given 7/18/24 2003)   CT scan flush (65 mLs Intravenous $Given 7/18/24 2054)   iopamidol (ISOVUE-370) solution 500 mL (100 mLs Intravenous $Given 7/18/24 2054)   HYDROmorphone (PF) (DILAUDID) injection 0.5 mg (0.5 mg Intravenous $Given 7/18/24 2130)       Procedures   None     Discussion of Management   None    ED Course   ED Course as of 07/19/24 1252   Thu Jul 18, 2024 1849 I initially assessed the patient and obtained the above history and physical exam.     2208 I rechecked with the patient.       Optional/Additional Documentation  None    Medical Decision Making / Diagnosis     CMS Diagnoses: None    MIPS       None    MDM    Yvonne Diez is a 43 year old female who presents for eval ongoing bL lower abdominal pain. Hx endometriosis.  Given constipation, hx adhesions no prior recent ct imaging elected to pursue which showed evidence of constipation but no obstruction/impaction as well as R ovarian cyst.  Will defer US as just had this done at [facility] this morning through her OB which showed BL cysts w/out findings concerning for torsion and I do not suspect torsion given BL sx no vomiting etc.  No evidence of UTI. Neg preg.  Labs reassuring.  CT w/out other etiology of pain.  Nothing clinically to suggest PID.    Sx likely due to combination of cyst, constipation, possible contribution from her underlying endometriosis. Given pain meds here w/good relief.  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  She will follow-up w/ob and return for new worsening or changing sx.    Disposition   The patient was discharged.     Diagnosis     ICD-10-CM    1. Right ovarian cyst  N83.201       2. Constipation  K59.00       3. Endometriosis  N80.9            Discharge Medications   Discharge Medication List as of 7/18/2024 10:16 PM        START taking these medications    Details   oxyCODONE (ROXICODONE) 5 MG tablet Take 1 tablet (5 mg) by mouth every 6 hours as needed for moderate pain or moderate to severe pain, Disp-10 tablet, R-0, E-Prescribe               Scribe Disclosure:  I, Lucero Cintron, am serving as a scribe at 6:41 PM on 7/18/2024 to document services personally performed by Kyle Wadsworth PA-C based on my observations and the provider's statements to me.        Kyle Wadsworth PA-C  07/19/24 6590

## 2024-07-18 NOTE — ED TRIAGE NOTES
Patient ambulatory to triage reporting pelvic pain due to her endometriosis. Patient reports patient had a pelvic US today, and since then the pain has been uncontrollable. Last tylenol at 1430, advil 1630.

## 2024-07-19 ENCOUNTER — PATIENT OUTREACH (OUTPATIENT)
Dept: PEDIATRICS | Facility: CLINIC | Age: 44
End: 2024-07-19
Payer: COMMERCIAL

## 2024-07-19 NOTE — DISCHARGE INSTRUCTIONS
Discharge Instructions  Constipation  Constipation can cause severe cramping pain and your provider thinks this might be the cause of your abdominal pain (belly pain) today.  People usually recognize that they are constipated because they have difficulty having bowel movements, are not having bowel movements frequently enough, or are not having large enough bowel movements. Sometimes, especially in children or older people, you do not recognize that you are constipated until it becomes severe. The most common causes of constipation are a lack of exercise and not eating enough fruits, vegetables, and whole grains. Constipation can also be a side effect of medications, such as narcotics, or may be caused by a disease of the digestive system.    Generally, every Emergency Department visit should have a follow-up clinic visit with either a primary or a specialty clinic/provider. Please follow-up as instructed by your emergency provider today. Sometimes, chronic constipation requires further testing to determine the cause. If you are over 50 years old, you may need a colonoscopy if you have not had one before.     Return to the Emergency Department if:  Your abdominal pain worsens or does not improve after a bowel movement.  You become very weak.  You get a temperature above 102oF or as directed by your provider.  You have blood in your stools (bright red or black, tarry stools).  You keep vomiting (throwing up) or cannot drink liquids.  Your see blood when you vomit.  Your stomach gets bloated or bigger.  You have new symptoms or anything that worries you.    What can I do to help myself?  If your provider gave you a cathartic medication, like magnesium citrate or GoLytely  (polyethylene glycol), you can expect to have cramps and gas pains after taking it. You can expect to have a number of bowel movements and even diarrhea (loose or watery stools) in the course of clearing your bowels.  You will know your bowels have  been cleaned out after you pass clear liquid. The cramps and gas should let up after you have emptied your bowels. You may want to wait until morning to take this type of medication so you aren t up in the night.   Sometimes instead of cathartics, we recommend laxatives like milk of magnesia to move your bowels more slowly, or an enema to help the bowels to move. Read and follow the package directions, or follow your provider s instructions.  Once you have become very constipated, it takes time for your bowels to return to normal and you need to be very careful to prevent becoming constipated again. Take a laxative if you do not move your bowels at least every two days.     Eat foods that have a lot of fiber. Good choices are fruits, vegetables, prune juice, apple juice, and high fiber cereal. Limit dairy products such as milk and cheese, since these can make constipation worse.   Drink plenty of water.   When you feel the need to go to the bathroom, go to the bathroom. Do not hold it.  Miralax , Metamucil , Colace , Senna or fiber supplements can be used daily.  Miralax  daily is often the best choice for children.  If you were given a prescription for medicine here today, be sure to read all of the information (including the package insert) that comes with your prescription.  This will include important information about the medicine, its side effects, and any warnings that you need to know about.  The pharmacist who fills the prescription can provide more information and answer questions you may have about the medicine.  If you have questions or concerns that the pharmacist cannot address, please call or return to the Emergency Department.   Remember that you can always come back to the Emergency Department if you are not able to see your regular provider in the amount of time listed above, if you get any new symptoms, or if there is anything that worries you.  Discharge Instructions  Ovarian Cyst    Abdominal  (belly) pain can be caused by many things. Your provider today has found that you have a cyst on the ovary. Women in their reproductive years form cysts every month, but only cause pain if they are very large, or if they rupture and release blood or fluid. Fortunately, they rarely require surgery or hospitalization. The pain from a ruptured cyst usually gets gradually better, and should be much better within a few days. If there is a large cyst, it will usually go away within 1-2 months, but needs to be watched to be sure it does go away, since sometimes a large cyst can become a cancer. There can be complications of a cyst, or other problems that cannot be found right away, so it is very important that you follow up as directed.      Generally, every Emergency Department visit should have a follow-up clinic visit with either a primary or a specialty clinic/provider. Please follow-up as instructed by your emergency provider today.    Return to the Emergency Department right away if:  Your pain becomes much worse or constant  You get an oral temperature above 100.4 F or as directed by your provider.  You have frequent vomiting  You faint, or feel very weak.  You have new symptoms or anything that worries you.    What can I do to help myself?  Take any medication prescribed by your provider.  You may use Tylenol  (acetaminophen) or Advil , Motrin  (ibuprofen) for pain. Be sure to read and follow the package directions, and ask your provider if you have questions.  Avoid sex for several days, because it will probably be painful.    If you were given a prescription for medicine here today, be sure to read all of the information (including the package insert) that comes with your prescription.  This will include important information about the medicine, its side effects, and any warnings that you need to know about.  The pharmacist who fills the prescription can provide more information and answer questions you may have  about the medicine.  If you have questions or concerns that the pharmacist cannot address, please call or return to the Emergency Department.     Remember that you can always come back to the Emergency Department if you are not able to see your regular provider in the amount of time listed above, if you get any new symptoms, or if there is anything that worries you.  Opioid Medication Information    You have been given a prescription for an opioid (narcotic) pain medicine and/or have received a pain medicine while here in the Emergency Department. These medicines can make you drowsy or impaired. You must not drive, operate dangerous equipment, or engage in any other dangerous activities while taking these medications. If you drive while taking these medications, you could be arrested for driving under the influence (DUI). Do not drink any alcohol while you are taking these medications.     Opioid pain medications can cause addiction. If you have a history of chemical dependency of any type, you are at a higher risk of becoming addicted to pain medications.  Only take these prescribed medications to treat your pain when all other options have been tried. Take it for as short a time and as few doses as possible. Store your pain pills in a secure place, as they are frequently stolen and provide a dangerous opportunity for children or visitors in your house to start abusing these powerful medications. We will not replace any lost or stolen medicine.    If you do not finish your medication, it is a good idea to get rid of it but please do not flush it down the toilet. Please dispose of the remaining medication at a local pharmacy or law enforcement facility. The [place] [employer] has additional information on medication disposal: https://www.pca.[facility].[place]/living-green/managing-unwanted-medications.      Many prescription pain medications contain Tylenol  (acetaminophen), including Vicodin , Tylenol  #3 , Norco , Lortab , and Percocet .  You should not take any extra pills of Tylenol  if you are using these prescription medications or you can get very sick.  Do not ever take more than 3000 mg of acetaminophen in any 24 hour period.    All opioids tend to cause constipation. Drink plenty of water and eat foods that have a lot of fiber, such as fruits, vegetables, prune juice, apple juice and high fiber cereal.  Take a laxative if you don t move your bowels at least every other day. Miralax , Milk of Magnesia, Colace , or Senna  can be used to keep you regular.

## 2024-07-19 NOTE — TELEPHONE ENCOUNTER
Call patient for hospital follow up.  Pelvic Pain: Right ovarian cyst, Constipation, Endometriosis     Med changes:        Appointment needed within 30 days.     Hospital follow up appointment has not been made

## 2024-07-22 NOTE — TELEPHONE ENCOUNTER
Transitions of Care Outreach  Chief Complaint   Patient presents with    Hospital F/U       Most Recent Admission Date: 7/18/2024   Most Recent Admission Diagnosis:      Most Recent Discharge Date: 7/18/2024   Most Recent Discharge Diagnosis: Right ovarian cyst - N83.201  Constipation - K59.00  Endometriosis - N80.9     Transitions of Care Assessment    Discharge Assessment  How are you doing now that you are home?: symptoms go day to day depending on what her endometriosis decides to do. definitely now that bowels are more empty, it is less painful. Patient is speaking with her surgeon on friday and has EGD and colonoscopy scheduled with [facility] 7/29/24.  How are your symptoms? (Red Flag symptoms escalate to triage hotline per guidelines): Unchanged  Do you know how to contact your clinic care team if you have future questions or changes to your health status? : Yes  Does the patient have their discharge instructions? : Yes  Does the patient have questions regarding their discharge instructions? : No  Were you started on any new medications or were there changes to any of your previous medications? : Yes  Does the patient have all of their medications?: Yes  Do you have questions regarding any of your medications? : No  Do you have all of your needed medical supplies or equipment (DME)?  (i.e. oxygen tank, CPAP, cane, etc.): Yes    Follow up Plan     Discharge Follow-Up  Discharge follow up appointment scheduled in alignment with recommended follow up timeframe or Transitions of Risk Category? (Low = within 30 days; Moderate= within 14 days; High= within 7 days): No  Patient's follow up appointment not scheduled: Patient declined scheduling support. Education on the importance of transitions of care follow up. Provided scheduling phone number. (Patient states she and PCP already discussed having e-visit after patient sees [facility])    No future appointments.    Outpatient Plan as outlined on AVS reviewed with  patient.    For any urgent concerns, please contact our 24 hour nurse triage line: [phone] ([phone])       Julius Morales RN

## 2024-07-29 ENCOUNTER — TRANSFERRED RECORDS (OUTPATIENT)
Dept: HEALTH INFORMATION MANAGEMENT | Facility: CLINIC | Age: 44
End: 2024-07-29
Payer: COMMERCIAL

## 2024-08-29 ENCOUNTER — HOSPITAL ENCOUNTER (EMERGENCY)
Facility: CLINIC | Age: 44
Discharge: HOME | End: 2024-08-29
Attending: PHYSICIAN ASSISTANT | Admitting: PHYSICIAN ASSISTANT
Payer: COMMERCIAL

## 2024-08-29 ENCOUNTER — APPOINTMENT (OUTPATIENT)
Dept: ULTRASOUND IMAGING | Facility: CLINIC | Age: 44
End: 2024-08-29
Attending: PHYSICIAN ASSISTANT
Payer: COMMERCIAL

## 2024-08-29 VITALS
SYSTOLIC BLOOD PRESSURE: 132 MMHG | WEIGHT: 216.93 LBS | HEIGHT: 68 IN | BODY MASS INDEX: 32.88 KG/M2 | DIASTOLIC BLOOD PRESSURE: 87 MMHG | OXYGEN SATURATION: 92 % | HEART RATE: 76 BPM | TEMPERATURE: 98 F | RESPIRATION RATE: 18 BRPM

## 2024-08-29 DIAGNOSIS — N83.202 LEFT OVARIAN CYST: ICD-10-CM

## 2024-08-29 LAB
ALBUMIN UR-MCNC: NEGATIVE MG/DL
ANION GAP SERPL CALCULATED.3IONS-SCNC: 11 MMOL/L (ref 7–15)
APPEARANCE UR: CLEAR
BACTERIA #/AREA URNS HPF: ABNORMAL /HPF
BASOPHILS # BLD AUTO: 0 10E3/UL (ref 0–0.2)
BASOPHILS NFR BLD AUTO: 1 %
BILIRUB UR QL STRIP: NEGATIVE
BUN SERPL-MCNC: 6.7 MG/DL (ref 6–20)
CALCIUM SERPL-MCNC: 8.7 MG/DL (ref 8.8–10.4)
CHLORIDE SERPL-SCNC: 102 MMOL/L (ref 98–107)
COLOR UR AUTO: ABNORMAL
CREAT SERPL-MCNC: 0.85 MG/DL (ref 0.51–0.95)
EGFRCR SERPLBLD CKD-EPI 2021: 88 ML/MIN/1.73M2
EOSINOPHIL # BLD AUTO: 0.2 10E3/UL (ref 0–0.7)
EOSINOPHIL NFR BLD AUTO: 4 %
ERYTHROCYTE [DISTWIDTH] IN BLOOD BY AUTOMATED COUNT: 12.6 % (ref 10–15)
GLUCOSE SERPL-MCNC: 94 MG/DL (ref 70–99)
GLUCOSE UR STRIP-MCNC: NEGATIVE MG/DL
HCG UR QL: NEGATIVE
HCO3 SERPL-SCNC: 25 MMOL/L (ref 22–29)
HCT VFR BLD AUTO: 37 % (ref 35–47)
HGB BLD-MCNC: 12.3 G/DL (ref 11.7–15.7)
HGB UR QL STRIP: NEGATIVE
IMM GRANULOCYTES # BLD: 0 10E3/UL
IMM GRANULOCYTES NFR BLD: 0 %
KETONES UR STRIP-MCNC: NEGATIVE MG/DL
LEUKOCYTE ESTERASE UR QL STRIP: NEGATIVE
LYMPHOCYTES # BLD AUTO: 2.3 10E3/UL (ref 0.8–5.3)
LYMPHOCYTES NFR BLD AUTO: 39 %
MCH RBC QN AUTO: 31.8 PG (ref 26.5–33)
MCHC RBC AUTO-ENTMCNC: 33.2 G/DL (ref 31.5–36.5)
MCV RBC AUTO: 96 FL (ref 78–100)
MONOCYTES # BLD AUTO: 0.5 10E3/UL (ref 0–1.3)
MONOCYTES NFR BLD AUTO: 9 %
NEUTROPHILS # BLD AUTO: 2.7 10E3/UL (ref 1.6–8.3)
NEUTROPHILS NFR BLD AUTO: 47 %
NITRATE UR QL: NEGATIVE
NRBC # BLD AUTO: 0 10E3/UL
NRBC BLD AUTO-RTO: 0 /100
PH UR STRIP: 7.5 [PH] (ref 5–7)
PLATELET # BLD AUTO: 294 10E3/UL (ref 150–450)
POTASSIUM SERPL-SCNC: 4.1 MMOL/L (ref 3.4–5.3)
RBC # BLD AUTO: 3.87 10E6/UL (ref 3.8–5.2)
RBC URINE: 1 /HPF
SODIUM SERPL-SCNC: 138 MMOL/L (ref 135–145)
SP GR UR STRIP: 1 (ref 1–1.03)
SQUAMOUS EPITHELIAL: 1 /HPF
UROBILINOGEN UR STRIP-MCNC: NORMAL MG/DL
WBC # BLD AUTO: 5.8 10E3/UL (ref 4–11)
WBC URINE: 5 /HPF

## 2024-08-29 PROCEDURE — 81001 URINALYSIS AUTO W/SCOPE: CPT | Performed by: PHYSICIAN ASSISTANT

## 2024-08-29 PROCEDURE — 96361 HYDRATE IV INFUSION ADD-ON: CPT

## 2024-08-29 PROCEDURE — 76830 TRANSVAGINAL US NON-OB: CPT

## 2024-08-29 PROCEDURE — 81025 URINE PREGNANCY TEST: CPT | Performed by: PHYSICIAN ASSISTANT

## 2024-08-29 PROCEDURE — 96374 THER/PROPH/DIAG INJ IV PUSH: CPT

## 2024-08-29 PROCEDURE — 80048 BASIC METABOLIC PNL TOTAL CA: CPT | Performed by: PHYSICIAN ASSISTANT

## 2024-08-29 PROCEDURE — 99285 EMERGENCY DEPT VISIT HI MDM: CPT | Mod: 25

## 2024-08-29 PROCEDURE — 96375 TX/PRO/DX INJ NEW DRUG ADDON: CPT

## 2024-08-29 PROCEDURE — 36415 COLL VENOUS BLD VENIPUNCTURE: CPT | Performed by: PHYSICIAN ASSISTANT

## 2024-08-29 PROCEDURE — 85004 AUTOMATED DIFF WBC COUNT: CPT | Performed by: PHYSICIAN ASSISTANT

## 2024-08-29 PROCEDURE — 258N000003 HC RX IP 258 OP 636: Performed by: PHYSICIAN ASSISTANT

## 2024-08-29 PROCEDURE — 250N000011 HC RX IP 250 OP 636: Performed by: PHYSICIAN ASSISTANT

## 2024-08-29 PROCEDURE — 96376 TX/PRO/DX INJ SAME DRUG ADON: CPT

## 2024-08-29 RX ORDER — ONDANSETRON 2 MG/ML
4 INJECTION INTRAMUSCULAR; INTRAVENOUS EVERY 30 MIN PRN
Status: DISCONTINUED | OUTPATIENT
Start: 2024-08-29 | End: 2024-08-29 | Stop reason: HOSPADM

## 2024-08-29 RX ORDER — HYDROMORPHONE HYDROCHLORIDE 1 MG/ML
0.5 INJECTION, SOLUTION INTRAMUSCULAR; INTRAVENOUS; SUBCUTANEOUS EVERY 30 MIN PRN
Status: COMPLETED | OUTPATIENT
Start: 2024-08-29 | End: 2024-08-29

## 2024-08-29 RX ORDER — OXYCODONE HYDROCHLORIDE 5 MG/1
5 TABLET ORAL EVERY 6 HOURS PRN
Qty: 6 TABLET | Refills: 0 | Status: SHIPPED | OUTPATIENT
Start: 2024-08-29 | End: 2024-09-01

## 2024-08-29 RX ADMIN — SODIUM CHLORIDE 1000 ML: 9 INJECTION, SOLUTION INTRAVENOUS at 10:14

## 2024-08-29 RX ADMIN — HYDROMORPHONE HYDROCHLORIDE 0.5 MG: 1 INJECTION, SOLUTION INTRAMUSCULAR; INTRAVENOUS; SUBCUTANEOUS at 11:00

## 2024-08-29 RX ADMIN — ONDANSETRON 4 MG: 2 INJECTION INTRAMUSCULAR; INTRAVENOUS at 12:20

## 2024-08-29 RX ADMIN — ONDANSETRON 4 MG: 2 INJECTION INTRAMUSCULAR; INTRAVENOUS at 10:21

## 2024-08-29 RX ADMIN — HYDROMORPHONE HYDROCHLORIDE 0.5 MG: 1 INJECTION, SOLUTION INTRAMUSCULAR; INTRAVENOUS; SUBCUTANEOUS at 10:21

## 2024-08-29 RX ADMIN — HYDROMORPHONE HYDROCHLORIDE 0.5 MG: 1 INJECTION, SOLUTION INTRAMUSCULAR; INTRAVENOUS; SUBCUTANEOUS at 12:16

## 2024-08-29 ASSESSMENT — ACTIVITIES OF DAILY LIVING (ADL)
ADLS_ACUITY_SCORE: 33
ADLS_ACUITY_SCORE: 35

## 2024-08-29 NOTE — ED NOTES
Pt arrives ambulatory by self for RLQ pain that began around 4am. History of cyst on right ovary and is worried about rupture. Took ibuprofen, tylenol, oxycodoe around 4:30am that helped. Vomited prior to taking medication. History of two c-sections, laporoscopic endometriosis ablations.

## 2024-08-29 NOTE — ED PROVIDER NOTES
"  Emergency Department Note      History of Present Illness     Chief Complaint   Pelvic Pain      HPI   Yvonne Diez is a 43 year old female with a history of endometriosis, diagnosed left ovarian cyst who presents with left lower quadrant pain. Patient notes the onset of of her left lower quadrant pain as this morning at 0400. She states she sustained the \"worst pain,\" that radiated to the left side of her back. She states that she took Tylenol, ibuprofen, oxycodone, gabapentin for pain management, which reduced her pain assessment to a 6 out of 10 at the emergency department. She endorses nausea, vomiting from the pain. She endorses a history of abdominal surgeries for her endometriosis. She denies a history of kidney stones. She denies a concern for pregnancy. She denies abnormal bowel movements. She denies fevers, chills. She denies abnormal urinary output, hematuria. She denies right pelvic, right flank pain.      Independent Historian   None    Review of External Notes   OB visit on 7/26/24  Assessment & Plan  1. Endometriosis.  Symptoms and medical history may suggest a recurrence of endometriosis although this is unusual so soon after surgery. She is on hormonal suppression in the form of a Mirena IUD and now has prometrium layered on top of this. Various treatment options were discussed, including the use of Orilissa to temporarily reduce estrogen levels, pelvic floor physical therapy, and hysterectomy. A prescription for gabapentin was provided, to be taken once at night initially. If well-tolerated, the dosage can be increased to three times a day. If symptoms improve with Orilissa, it may indicate the effectiveness of removing the ovaries during a hysterectomy. The patient will inform us of her decision regarding the proposed treatment options. Information on hysterectomies with and without oophrectomise were given . Information on my surgeries were given as well.     3. Adenomyosis.  The " "ultrasound showed adenomyosis. The patient is currently using micronized progesterone (Prometrium) as needed, but it has been affecting her mood. It was suggested to consider taking Prometrium daily, but she prefers to use it as needed due to mood disturbances. If symptoms persist, other hormonal treatments or surgical options may be considered.    4. Bilocular ovarian cyst.  The ultrasound revealed a bilocular fluid-filled cyst on the right ovary measuring 5.2 cm . The left ovary appears normal with a small cyst. No immediate intervention is required, but monitoring for changes in symptoms is advised.     Past Medical History     Medical History and Problem List   Anxiety  Depression  Endometriosis  Heart palpitations  Oligomenorrhea  Thrombophlebitis of superficial veins of left lower extremity    Medications   Klonopin  Flexeril  Levsin  Roxicodone  Wellbutrin  Cymbalta  Prilosec  Desyrel    Surgical History   Biopsy  Colonoscopy   Laparoscopy  [place] teeth extraction     Physical Exam     Patient Vitals for the past 24 hrs:   BP Temp Temp src Pulse Resp SpO2 Height Weight   08/29/24 1206 -- -- -- -- -- 97 % -- --   08/29/24 1205 132/87 -- -- 76 -- -- -- --   08/29/24 1121 (!) 121/93 -- -- -- -- 94 % -- --   08/29/24 1106 (!) 131/92 -- -- -- -- 95 % -- --   08/29/24 1047 132/84 -- -- 79 -- 97 % -- --   08/29/24 1032 128/85 -- -- 83 -- 98 % -- --   08/29/24 1016 107/74 -- -- 76 -- 98 % -- --   08/29/24 0920 (!) 129/97 98  F (36.7  C) Temporal 96 18 100 % 1.727 m (5' 8\") 98.4 kg (216 lb 14.9 oz)     Physical Exam  Constitutional: Pleasant. Cooperative.   Eyes: Pupils equally round and reactive  HENT: Head is normal in appearance. Oropharynx is normal with moist mucus membranes.  Cardiovascular: Regular rate and rhythm and without murmurs.  Respiratory: Normal respiratory effort, lungs are clear bilaterally.  GI: LLQ TTP, otherwise non-tender, soft, non-distended. No guarding, rebound, or rigidity.  Musculoskeletal: " No asymmetry of the lower extremities. No CVA TTP.  Skin: Normal, without rash.  Neurologic: Cranial nerves grossly intact, normal cognition, no focal deficits. Alert and oriented x 3.   Psychiatric: Normal affect.  Nursing notes and vital signs reviewed    Diagnostics     Lab Results   Labs Ordered and Resulted from Time of ED Arrival to Time of ED Departure   BASIC METABOLIC PANEL - Abnormal       Result Value    Sodium 138      Potassium 4.1      Chloride 102      Carbon Dioxide (CO2) 25      Anion Gap 11      Urea Nitrogen 6.7      Creatinine 0.85      GFR Estimate 88      Calcium 8.7 (*)     Glucose 94     ROUTINE UA WITH MICROSCOPIC REFLEX TO CULTURE - Abnormal    Color Urine Light Yellow      Appearance Urine Clear      Glucose Urine Negative      Bilirubin Urine Negative      Ketones Urine Negative      Specific Gravity Urine 1.005      Blood Urine Negative      pH Urine 7.5 (*)     Protein Albumin Urine Negative      Urobilinogen Urine Normal      Nitrite Urine Negative      Leukocyte Esterase Urine Negative      Bacteria Urine Many (*)     RBC Urine 1      WBC Urine 5      Squamous Epithelials Urine 1     HCG QUALITATIVE URINE - Normal    hCG Urine Qualitative Negative     CBC WITH PLATELETS AND DIFFERENTIAL    WBC Count 5.8      RBC Count 3.87      Hemoglobin 12.3      Hematocrit 37.0      MCV 96      MCH 31.8      MCHC 33.2      RDW 12.6      Platelet Count 294      % Neutrophils 47      % Lymphocytes 39      % Monocytes 9      % Eosinophils 4      % Basophils 1      % Immature Granulocytes 0      NRBCs per 100 WBC 0      Absolute Neutrophils 2.7      Absolute Lymphocytes 2.3      Absolute Monocytes 0.5      Absolute Eosinophils 0.2      Absolute Basophils 0.0      Absolute Immature Granulocytes 0.0      Absolute NRBCs 0.0         Imaging   US Pelvis Cmplt w Transvag & Doppler LmtPel Duplex Limited   Final Result   IMPRESSION:    1. No torsion demonstrated.   2. 3.9 cm simple left ovarian cyst.      NIC  MD YENI            SYSTEM ID:  [id number]        Independent Interpretation   None    ED Course      Medications Administered   Medications   ondansetron (ZOFRAN) injection 4 mg (4 mg Intravenous $Given 8/29/24 1220)   sodium chloride 0.9% BOLUS 1,000 mL (0 mLs Intravenous Stopped 8/29/24 1215)   HYDROmorphone (PF) (DILAUDID) injection 0.5 mg (0.5 mg Intravenous $Given 8/29/24 1216)       Procedures   Procedures     Discussion of Management   None    ED Course   ED Course as of 08/29/24 1315   Thu Aug 29, 2024   0945 I obtained history and examined the patient as noted above     1225 I rechecked the patient and explained findings.     1305  Discussed findings. Plan for home.    Additional Documentation  None    Medical Decision Making / Diagnosis     CMS Diagnoses: None    MIPS       None    [facility]   Yvonne Diez is a 43 year old female with a history of endometriosis, ovarian cysts, who presents to the ED for evaluation of pelvic pain.  See HPI as above for additional details.  Vitals and physical exam as above.  Differential is broad and included ovarian torsion, ovarian cyst, ectopic pregnancy, uterine fibroid, constipation, diverticulitis, kidney stone, UTI, pyelo-, amongst others.  Workup obtained as above reveals evidence for left-sided ovarian cyst without evidence for torsion.  Patient is not pregnant.  UA without red cells or white cells to suggest for kidney stone or pyelonephritis.  Greater suspicion for cyst as etiology of patient's pain.  Patient provided the above interventions with improvement of symptoms.  Discussed with patient option for CT imaging of abdomen, however with shared decision making we will defer on this scan at this time.  Do feel patient is safe for discharge to home.  Will send patient home with short course of oxycodone as below, discussed narcotic precautions.  Advised Tylenol and ibuprofen for pain otherwise.  Discussed reasons to return. All questions answered. Patient  discharged to home in stable condition.    Disposition   The patient was discharged.     Diagnosis     ICD-10-CM    1. Left ovarian cyst  N83.202            Discharge Medications   New Prescriptions    OXYCODONE (ROXICODONE) 5 MG TABLET    Take 1 tablet (5 mg) by mouth every 6 hours as needed for breakthrough pain.         Scribe Disclosure:  I, Liliya Osman, am serving as a scribe at 10:07 AM on 8/29/2024 to document services personally performed by Jewel Garcia PA-C based on my observations and the provider's statements to me.     This record was created at least in part using electronic voice recognition software, so please excuse any typographical errors.       Jewel Garcia PA-C  08/29/24 1397

## 2024-08-29 NOTE — DISCHARGE INSTRUCTIONS
The cause of your pain is likely related to the large ovarian cyst present on your left ovary. Use Tylenol and ibuprofen for pain. Use oxycodone for breakthrough pain. This is sedating. Do not drive after taking. Follow up with OBGYN with persistent symptoms. Return with significant worsening.

## 2024-09-01 ENCOUNTER — APPOINTMENT (OUTPATIENT)
Dept: ULTRASOUND IMAGING | Facility: CLINIC | Age: 44
End: 2024-09-01
Attending: EMERGENCY MEDICINE
Payer: COMMERCIAL

## 2024-09-01 ENCOUNTER — HOSPITAL ENCOUNTER (OUTPATIENT)
Facility: CLINIC | Age: 44
Setting detail: OBSERVATION
Discharge: HOME | End: 2024-09-02
Attending: EMERGENCY MEDICINE | Admitting: OBSTETRICS & GYNECOLOGY
Payer: COMMERCIAL

## 2024-09-01 DIAGNOSIS — N80.9 ENDOMETRIOSIS: Primary | ICD-10-CM

## 2024-09-01 DIAGNOSIS — N83.202 LEFT OVARIAN CYST: ICD-10-CM

## 2024-09-01 LAB
ALBUMIN UR-MCNC: NEGATIVE MG/DL
ANION GAP SERPL CALCULATED.3IONS-SCNC: 12 MMOL/L (ref 7–15)
APPEARANCE UR: CLEAR
BACTERIA #/AREA URNS HPF: ABNORMAL /HPF
BASOPHILS # BLD AUTO: 0 10E3/UL (ref 0–0.2)
BASOPHILS NFR BLD AUTO: 1 %
BILIRUB UR QL STRIP: NEGATIVE
BUN SERPL-MCNC: 9.8 MG/DL (ref 6–20)
CALCIUM SERPL-MCNC: 10 MG/DL (ref 8.8–10.4)
CHLORIDE SERPL-SCNC: 101 MMOL/L (ref 98–107)
COLOR UR AUTO: ABNORMAL
CREAT SERPL-MCNC: 0.92 MG/DL (ref 0.51–0.95)
EGFRCR SERPLBLD CKD-EPI 2021: 80 ML/MIN/1.73M2
EOSINOPHIL # BLD AUTO: 0.1 10E3/UL (ref 0–0.7)
EOSINOPHIL NFR BLD AUTO: 2 %
ERYTHROCYTE [DISTWIDTH] IN BLOOD BY AUTOMATED COUNT: 12.5 % (ref 10–15)
GLUCOSE SERPL-MCNC: 106 MG/DL (ref 70–99)
GLUCOSE UR STRIP-MCNC: NEGATIVE MG/DL
HCG SERPL QL: NEGATIVE
HCO3 SERPL-SCNC: 26 MMOL/L (ref 22–29)
HCT VFR BLD AUTO: 37.4 % (ref 35–47)
HGB BLD-MCNC: 12.6 G/DL (ref 11.7–15.7)
HGB UR QL STRIP: NEGATIVE
IMM GRANULOCYTES # BLD: 0.1 10E3/UL
IMM GRANULOCYTES NFR BLD: 1 %
KETONES UR STRIP-MCNC: NEGATIVE MG/DL
LEUKOCYTE ESTERASE UR QL STRIP: NEGATIVE
LYMPHOCYTES # BLD AUTO: 1.5 10E3/UL (ref 0.8–5.3)
LYMPHOCYTES NFR BLD AUTO: 23 %
MCH RBC QN AUTO: 32 PG (ref 26.5–33)
MCHC RBC AUTO-ENTMCNC: 33.7 G/DL (ref 31.5–36.5)
MCV RBC AUTO: 95 FL (ref 78–100)
MONOCYTES # BLD AUTO: 0.5 10E3/UL (ref 0–1.3)
MONOCYTES NFR BLD AUTO: 8 %
MUCOUS THREADS #/AREA URNS LPF: PRESENT /LPF
NEUTROPHILS # BLD AUTO: 4.2 10E3/UL (ref 1.6–8.3)
NEUTROPHILS NFR BLD AUTO: 65 %
NITRATE UR QL: NEGATIVE
NRBC # BLD AUTO: 0 10E3/UL
NRBC BLD AUTO-RTO: 0 /100
PH UR STRIP: 7 [PH] (ref 5–7)
PLATELET # BLD AUTO: 316 10E3/UL (ref 150–450)
POTASSIUM SERPL-SCNC: 4.3 MMOL/L (ref 3.4–5.3)
RBC # BLD AUTO: 3.94 10E6/UL (ref 3.8–5.2)
RBC URINE: 3 /HPF
SODIUM SERPL-SCNC: 139 MMOL/L (ref 135–145)
SP GR UR STRIP: 1.01 (ref 1–1.03)
SQUAMOUS EPITHELIAL: <1 /HPF
UROBILINOGEN UR STRIP-MCNC: NORMAL MG/DL
WBC # BLD AUTO: 6.5 10E3/UL (ref 4–11)
WBC URINE: <1 /HPF

## 2024-09-01 PROCEDURE — 96375 TX/PRO/DX INJ NEW DRUG ADDON: CPT

## 2024-09-01 PROCEDURE — 250N000011 HC RX IP 250 OP 636: Performed by: EMERGENCY MEDICINE

## 2024-09-01 PROCEDURE — 84703 CHORIONIC GONADOTROPIN ASSAY: CPT | Performed by: EMERGENCY MEDICINE

## 2024-09-01 PROCEDURE — 250N000013 HC RX MED GY IP 250 OP 250 PS 637: Performed by: OBSTETRICS & GYNECOLOGY

## 2024-09-01 PROCEDURE — 99285 EMERGENCY DEPT VISIT HI MDM: CPT | Mod: 25

## 2024-09-01 PROCEDURE — 76830 TRANSVAGINAL US NON-OB: CPT

## 2024-09-01 PROCEDURE — 250N000011 HC RX IP 250 OP 636: Performed by: OBSTETRICS & GYNECOLOGY

## 2024-09-01 PROCEDURE — 93976 VASCULAR STUDY: CPT | Mod: XU

## 2024-09-01 PROCEDURE — 258N000003 HC RX IP 258 OP 636: Performed by: OBSTETRICS & GYNECOLOGY

## 2024-09-01 PROCEDURE — 96376 TX/PRO/DX INJ SAME DRUG ADON: CPT | Mod: XU

## 2024-09-01 PROCEDURE — G0378 HOSPITAL OBSERVATION PER HR: HCPCS

## 2024-09-01 PROCEDURE — 96374 THER/PROPH/DIAG INJ IV PUSH: CPT

## 2024-09-01 PROCEDURE — 36415 COLL VENOUS BLD VENIPUNCTURE: CPT | Performed by: EMERGENCY MEDICINE

## 2024-09-01 PROCEDURE — 96376 TX/PRO/DX INJ SAME DRUG ADON: CPT

## 2024-09-01 PROCEDURE — 80048 BASIC METABOLIC PNL TOTAL CA: CPT | Performed by: EMERGENCY MEDICINE

## 2024-09-01 PROCEDURE — 81001 URINALYSIS AUTO W/SCOPE: CPT | Performed by: EMERGENCY MEDICINE

## 2024-09-01 PROCEDURE — 85025 COMPLETE CBC W/AUTO DIFF WBC: CPT | Performed by: EMERGENCY MEDICINE

## 2024-09-01 RX ORDER — HYDROXYZINE HYDROCHLORIDE 25 MG/1
50 TABLET, FILM COATED ORAL EVERY 6 HOURS PRN
Status: DISCONTINUED | OUTPATIENT
Start: 2024-09-01 | End: 2024-09-02 | Stop reason: HOSPADM

## 2024-09-01 RX ORDER — AMOXICILLIN 250 MG
2 CAPSULE ORAL 2 TIMES DAILY PRN
Status: DISCONTINUED | OUTPATIENT
Start: 2024-09-01 | End: 2024-09-02 | Stop reason: HOSPADM

## 2024-09-01 RX ORDER — IBUPROFEN 600 MG/1
600 TABLET, FILM COATED ORAL EVERY 6 HOURS
Status: DISCONTINUED | OUTPATIENT
Start: 2024-09-01 | End: 2024-09-02 | Stop reason: HOSPADM

## 2024-09-01 RX ORDER — ONDANSETRON 2 MG/ML
4 INJECTION INTRAMUSCULAR; INTRAVENOUS EVERY 30 MIN PRN
Status: DISCONTINUED | OUTPATIENT
Start: 2024-09-01 | End: 2024-09-01

## 2024-09-01 RX ORDER — ONDANSETRON 2 MG/ML
4 INJECTION INTRAMUSCULAR; INTRAVENOUS EVERY 6 HOURS PRN
Status: DISCONTINUED | OUTPATIENT
Start: 2024-09-01 | End: 2024-09-02 | Stop reason: HOSPADM

## 2024-09-01 RX ORDER — HYDROMORPHONE HYDROCHLORIDE 1 MG/ML
0.5 INJECTION, SOLUTION INTRAMUSCULAR; INTRAVENOUS; SUBCUTANEOUS
Status: DISCONTINUED | OUTPATIENT
Start: 2024-09-01 | End: 2024-09-02 | Stop reason: HOSPADM

## 2024-09-01 RX ORDER — ONDANSETRON 2 MG/ML
4 INJECTION INTRAMUSCULAR; INTRAVENOUS ONCE
Status: COMPLETED | OUTPATIENT
Start: 2024-09-01 | End: 2024-09-01

## 2024-09-01 RX ORDER — OXYCODONE HYDROCHLORIDE 5 MG/1
5 TABLET ORAL EVERY 4 HOURS PRN
Status: DISCONTINUED | OUTPATIENT
Start: 2024-09-01 | End: 2024-09-02 | Stop reason: HOSPADM

## 2024-09-01 RX ORDER — BUPROPION HYDROCHLORIDE 200 MG/1
200 TABLET, EXTENDED RELEASE ORAL DAILY
COMMUNITY
Start: 2024-07-30

## 2024-09-01 RX ORDER — HYDROXYZINE HYDROCHLORIDE 25 MG/1
25 TABLET, FILM COATED ORAL EVERY 6 HOURS PRN
Status: DISCONTINUED | OUTPATIENT
Start: 2024-09-01 | End: 2024-09-02 | Stop reason: HOSPADM

## 2024-09-01 RX ORDER — GABAPENTIN 300 MG/1
300 CAPSULE ORAL 3 TIMES DAILY PRN
COMMUNITY

## 2024-09-01 RX ORDER — AMOXICILLIN 250 MG
1 CAPSULE ORAL 2 TIMES DAILY PRN
Status: DISCONTINUED | OUTPATIENT
Start: 2024-09-01 | End: 2024-09-02 | Stop reason: HOSPADM

## 2024-09-01 RX ORDER — ACETAMINOPHEN 650 MG/1
650 SUPPOSITORY RECTAL EVERY 6 HOURS
Status: DISCONTINUED | OUTPATIENT
Start: 2024-09-01 | End: 2024-09-02 | Stop reason: HOSPADM

## 2024-09-01 RX ORDER — PANTOPRAZOLE SODIUM 40 MG/1
40 TABLET, DELAYED RELEASE ORAL 2 TIMES DAILY
COMMUNITY
Start: 2024-07-30

## 2024-09-01 RX ORDER — DULOXETIN HYDROCHLORIDE 30 MG/1
30 CAPSULE, DELAYED RELEASE ORAL AT BEDTIME
COMMUNITY
Start: 2024-08-19

## 2024-09-01 RX ORDER — ACETAMINOPHEN 325 MG/1
650 TABLET ORAL EVERY 6 HOURS
Status: DISCONTINUED | OUTPATIENT
Start: 2024-09-01 | End: 2024-09-02 | Stop reason: HOSPADM

## 2024-09-01 RX ORDER — ONDANSETRON 4 MG/1
4 TABLET, ORALLY DISINTEGRATING ORAL EVERY 6 HOURS PRN
Status: DISCONTINUED | OUTPATIENT
Start: 2024-09-01 | End: 2024-09-02 | Stop reason: HOSPADM

## 2024-09-01 RX ORDER — MORPHINE SULFATE 4 MG/ML
4 INJECTION, SOLUTION INTRAMUSCULAR; INTRAVENOUS ONCE
Status: COMPLETED | OUTPATIENT
Start: 2024-09-01 | End: 2024-09-01

## 2024-09-01 RX ORDER — OXYCODONE HYDROCHLORIDE 10 MG/1
10 TABLET ORAL EVERY 4 HOURS PRN
Status: DISCONTINUED | OUTPATIENT
Start: 2024-09-01 | End: 2024-09-02 | Stop reason: HOSPADM

## 2024-09-01 RX ORDER — SODIUM CHLORIDE, SODIUM LACTATE, POTASSIUM CHLORIDE, CALCIUM CHLORIDE 600; 310; 30; 20 MG/100ML; MG/100ML; MG/100ML; MG/100ML
INJECTION, SOLUTION INTRAVENOUS CONTINUOUS
Status: DISCONTINUED | OUTPATIENT
Start: 2024-09-01 | End: 2024-09-02 | Stop reason: HOSPADM

## 2024-09-01 RX ORDER — DOCUSATE SODIUM 100 MG/1
100 CAPSULE, LIQUID FILLED ORAL 2 TIMES DAILY
Status: DISCONTINUED | OUTPATIENT
Start: 2024-09-01 | End: 2024-09-02 | Stop reason: HOSPADM

## 2024-09-01 RX ORDER — POLYETHYLENE GLYCOL 3350 17 G/17G
1 POWDER, FOR SOLUTION ORAL DAILY
COMMUNITY

## 2024-09-01 RX ADMIN — SODIUM CHLORIDE, POTASSIUM CHLORIDE, SODIUM LACTATE AND CALCIUM CHLORIDE: 600; 310; 30; 20 INJECTION, SOLUTION INTRAVENOUS at 22:57

## 2024-09-01 RX ADMIN — HYDROMORPHONE HYDROCHLORIDE 1 MG: 1 INJECTION, SOLUTION INTRAMUSCULAR; INTRAVENOUS; SUBCUTANEOUS at 15:05

## 2024-09-01 RX ADMIN — HYDROMORPHONE HYDROCHLORIDE 1 MG: 1 INJECTION, SOLUTION INTRAMUSCULAR; INTRAVENOUS; SUBCUTANEOUS at 18:29

## 2024-09-01 RX ADMIN — HYDROMORPHONE HYDROCHLORIDE 0.5 MG: 1 INJECTION, SOLUTION INTRAMUSCULAR; INTRAVENOUS; SUBCUTANEOUS at 21:06

## 2024-09-01 RX ADMIN — ONDANSETRON 4 MG: 2 INJECTION INTRAMUSCULAR; INTRAVENOUS at 18:29

## 2024-09-01 RX ADMIN — MORPHINE SULFATE 4 MG: 4 INJECTION INTRAVENOUS at 14:06

## 2024-09-01 RX ADMIN — HYDROMORPHONE HYDROCHLORIDE 1 MG: 1 INJECTION, SOLUTION INTRAMUSCULAR; INTRAVENOUS; SUBCUTANEOUS at 16:06

## 2024-09-01 RX ADMIN — IBUPROFEN 600 MG: 600 TABLET, FILM COATED ORAL at 21:05

## 2024-09-01 RX ADMIN — ONDANSETRON 4 MG: 2 INJECTION INTRAMUSCULAR; INTRAVENOUS at 14:05

## 2024-09-01 RX ADMIN — ACETAMINOPHEN 325MG 650 MG: 325 TABLET ORAL at 23:21

## 2024-09-01 RX ADMIN — HYDROMORPHONE HYDROCHLORIDE 0.5 MG: 1 INJECTION, SOLUTION INTRAMUSCULAR; INTRAVENOUS; SUBCUTANEOUS at 23:11

## 2024-09-01 RX ADMIN — HYDROXYZINE HYDROCHLORIDE 25 MG: 25 TABLET ORAL at 23:21

## 2024-09-01 ASSESSMENT — ACTIVITIES OF DAILY LIVING (ADL)
ADLS_ACUITY_SCORE: 35
ADLS_ACUITY_SCORE: 36
ADLS_ACUITY_SCORE: 36
ADLS_ACUITY_SCORE: 35
ADLS_ACUITY_SCORE: 35

## 2024-09-01 ASSESSMENT — COLUMBIA-SUICIDE SEVERITY RATING SCALE - C-SSRS
2. HAVE YOU ACTUALLY HAD ANY THOUGHTS OF KILLING YOURSELF IN THE PAST MONTH?: NO
1. IN THE PAST MONTH, HAVE YOU WISHED YOU WERE DEAD OR WISHED YOU COULD GO TO SLEEP AND NOT WAKE UP?: NO
6. HAVE YOU EVER DONE ANYTHING, STARTED TO DO ANYTHING, OR PREPARED TO DO ANYTHING TO END YOUR LIFE?: NO

## 2024-09-01 NOTE — ED NOTES
[facility]  ED Nurse Handoff Report    ED Chief complaint: Abdominal Pain  . ED Diagnosis:   Final diagnoses:   Left ovarian cyst       Allergies:   Allergies   Allergen Reactions    Sulfa Antibiotics Hives     Mother said this happened as a child       Code Status: Full Code    Activity level - Baseline/Home:  independent.  Activity Level - Current:   independent.   Lift room needed: No.   Bariatric: No   Needed: No   Isolation: No.   Infection: Not Applicable.     Respiratory status: Room air    Vital Signs (within 30 minutes):   Vitals:    09/01/24 1545 09/01/24 1600 09/01/24 1615 09/01/24 1630   BP:       Pulse:       Resp:       Temp:       TempSrc:       SpO2: 94% 93% 97% 94%   Weight:       Height:           Cardiac Rhythm:  ,      Pain level:    Patient confused: No.   Patient Falls Risk: nonskid shoes/slippers when out of bed.   Elimination Status: Has voided     Patient Report - Initial Complaint: Abdominal pain.   Focused Assessment: Yvonne Diez is a 43 year old female with a history of endometriosis presenting with abdominal pain that radiates to the back. The patient was evaluated for similar symptoms on Thursday (8/29/24). She notes pain and vomiting around this time and presented to the ED over concern for ovarian torsion. A left ovarian cyst was found. Krishan continues to experience the same pain in the same area. Since Thursday, she has been using heat on the area and taking Tylenol and ibuprofen. She was prescribed oxycodone after he ED visit, but has since finished this prescription. Her last dose of oxycodone was last night around 2200. She endorses decreased food and liquid consumption. The patient denies bleeding, fever, vomiting, diarrhea, abnormal bowel movements, or pain with urination.      Abnormal Results:   Labs Ordered and Resulted from Time of ED Arrival to Time of ED Departure   BASIC METABOLIC PANEL - Abnormal       Result Value    Sodium 139       Potassium 4.3      Chloride 101      Carbon Dioxide (CO2) 26      Anion Gap 12      Urea Nitrogen 9.8      Creatinine 0.92      GFR Estimate 80      Calcium 10.0      Glucose 106 (*)    ROUTINE UA WITH MICROSCOPIC REFLEX TO CULTURE - Abnormal    Color Urine Light Yellow      Appearance Urine Clear      Glucose Urine Negative      Bilirubin Urine Negative      Ketones Urine Negative      Specific Gravity Urine 1.012      Blood Urine Negative      pH Urine 7.0      Protein Albumin Urine Negative      Urobilinogen Urine Normal      Nitrite Urine Negative      Leukocyte Esterase Urine Negative      Bacteria Urine Few (*)     Mucus Urine Present (*)     RBC Urine 3 (*)     WBC Urine <1      Squamous Epithelials Urine <1     HCG QUALITATIVE PREGNANCY - Normal    hCG Serum Qualitative Negative     CBC WITH PLATELETS AND DIFFERENTIAL    WBC Count 6.5      RBC Count 3.94      Hemoglobin 12.6      Hematocrit 37.4      MCV 95      MCH 32.0      MCHC 33.7      RDW 12.5      Platelet Count 316      % Neutrophils 65      % Lymphocytes 23      % Monocytes 8      % Eosinophils 2      % Basophils 1      % Immature Granulocytes 1      NRBCs per 100 WBC 0      Absolute Neutrophils 4.2      Absolute Lymphocytes 1.5      Absolute Monocytes 0.5      Absolute Eosinophils 0.1      Absolute Basophils 0.0      Absolute Immature Granulocytes 0.1      Absolute NRBCs 0.0          US Pelvic Complete w Transvaginal & Abd/Pel Duplex Limited   Final Result   IMPRESSION:     1.  No acute findings.   2.  Myometrial heterogeneity with indistinct endometrial-myometrial interface suggesting adenomyosis. MRI could be considered for confirmation, as indicated.   3.  Multilocular left ovarian cyst measuring up to 4.0 cm. Follow-up recommendations below:      PROBABLY BENIGN CYSTS:   (single thin (<3mm) septum, calcification in cyst wall, not classic to be described as hemorrhagic, endometrioma, or dermoid cyst).   Premenopausal/early post-menopausal (<  5 years from LMP):    <=7 cm: generally benign. Follow up in 6-12 weeks,       REFERENCE:   Management of Asymptomatic Ovarian and Other Adnexal Cysts Imaged at US: Society of Radiologists in Ultrasound Consensus Conference Statement. Radiology September 2010; [phone].      Simple Adnexal Cysts: SRU Consensus Conference Update on Follow-up and Reporting. Radiology September 2019. [phone].          Treatments provided: IV, ultrasound, blood work, Diluadid, zofran  Family Comments: NA  OBS brochure/video discussed/provided to patient:  Yes  ED Medications:   Medications   HYDROmorphone (DILAUDID) injection 1 mg (1 mg Intravenous $Given 9/1/24 1829)   ondansetron (ZOFRAN) injection 4 mg (4 mg Intravenous $Given 9/1/24 1829)   morphine (PF) injection 4 mg (4 mg Intravenous $Given 9/1/24 1406)   ondansetron (ZOFRAN) injection 4 mg (4 mg Intravenous $Given 9/1/24 1405)       Drips infusing:  No  For the majority of the shift this patient was Green.   Interventions performed were NA.    Sepsis treatment initiated: No    Cares/treatment/interventions/medications to be completed following ED care: See MAR    ED Nurse Name: Kadie Stone RN  6:54 PM    RECEIVING UNIT ED HANDOFF REVIEW    Above ED Nurse Handoff Report was reviewed: Yes  Reviewed by: Jacki Naylor, ADRIANNE on September 1, 2024 at 9:12 PM   WILL Khan called the ED to inform them the note was read: Yes

## 2024-09-01 NOTE — ED TRIAGE NOTES
Pt comes in with left ovarian pain.  Pt states that she was here on Thursday due to pain and a cyst was found. She states that the pain is severe and comes in for pain control     Triage Assessment (Adult)       Row Name 09/01/24 1321          Triage Assessment    Airway WDL WDL        Respiratory WDL    Respiratory WDL WDL        Cardiac WDL    Cardiac WDL WDL

## 2024-09-01 NOTE — ED PROVIDER NOTES
"  Emergency Department Note      History of Present Illness     Chief Complaint   Abdominal Pain    HPI   Yvonne Diez is a 43 year old female with a history of endometriosis presenting with abdominal pain that radiates to the back. The patient was evaluated for similar symptoms on Thursday (8/29/24). She notes pain and vomiting around this time and presented to the ED over concern for ovarian torsion. A left ovarian cyst was found. Krishan continues to experience the same pain in the same area. Since Thursday, she has been using heat on the area and taking Tylenol and ibuprofen. She was prescribed oxycodone after he ED visit, but has since finished this prescription. Her last dose of oxycodone was last night around 2200. She endorses decreased food and liquid consumption. The patient denies bleeding, fever, vomiting, diarrhea, abnormal bowel movements, or pain with urination.     Independent Historian   None    Review of External Notes   none    Past Medical History     Medical History and Problem List   Anxiety  Depression  Endometriosis  Heart palpitations  Oligomenorrhea  Thrombophlebitis of superficial veins of left lower extremity     Medications   Klonopin  Flexeril  Levsin  Roxicodone  Wellbutrin  Cymbalta  Prilosec  Desyrel     Surgical History   Biopsy  Colonoscopy   Laparoscopy  [place] teeth extraction     Physical Exam     Patient Vitals for the past 24 hrs:   BP Temp Temp src Pulse Resp SpO2 Height Weight   09/01/24 1630 -- -- -- -- -- 94 % -- --   09/01/24 1615 -- -- -- -- -- 97 % -- --   09/01/24 1600 -- -- -- -- -- 93 % -- --   09/01/24 1545 -- -- -- -- -- 94 % -- --   09/01/24 1530 -- -- -- -- -- 93 % -- --   09/01/24 1515 -- -- -- -- -- 94 % -- --   09/01/24 1500 110/61 -- -- 60 -- 96 % -- --   09/01/24 1323 (!) 149/92 97.6  F (36.4  C) Temporal 94 18 100 % 1.727 m (5' 8\") 97.9 kg (215 lb 13.3 oz)     Physical Exam  Constitutional:       Appearance: She is well-developed.   HENT:      Right " Ear: External ear normal.      Left Ear: External ear normal.      Mouth/Throat:      Mouth: Mucous membranes are moist.      Pharynx: Oropharynx is clear. No oropharyngeal exudate or posterior oropharyngeal erythema.   Eyes:      General: No scleral icterus.     Conjunctiva/sclera: Conjunctivae normal.      Pupils: Pupils are equal, round, and reactive to light.   Cardiovascular:      Rate and Rhythm: Normal rate and regular rhythm.      Heart sounds: Normal heart sounds. No murmur heard.     No friction rub. No gallop.   Pulmonary:      Effort: Pulmonary effort is normal. No respiratory distress.      Breath sounds: Normal breath sounds. No wheezing or rales.   Abdominal:      General: Bowel sounds are normal. There is no distension.      Palpations: Abdomen is soft. There is no mass.      Tenderness: There is abdominal tenderness. There is no right CVA tenderness or left CVA tenderness.      Comments: LLQ TTP   Skin:     General: Skin is warm and dry.      Capillary Refill: Capillary refill takes less than 2 seconds.      Findings: No rash.   Neurological:      Mental Status: She is alert.           Diagnostics     Lab Results   Labs Ordered and Resulted from Time of ED Arrival to Time of ED Departure   BASIC METABOLIC PANEL - Abnormal       Result Value    Sodium 139      Potassium 4.3      Chloride 101      Carbon Dioxide (CO2) 26      Anion Gap 12      Urea Nitrogen 9.8      Creatinine 0.92      GFR Estimate 80      Calcium 10.0      Glucose 106 (*)    ROUTINE UA WITH MICROSCOPIC REFLEX TO CULTURE - Abnormal    Color Urine Light Yellow      Appearance Urine Clear      Glucose Urine Negative      Bilirubin Urine Negative      Ketones Urine Negative      Specific Gravity Urine 1.012      Blood Urine Negative      pH Urine 7.0      Protein Albumin Urine Negative      Urobilinogen Urine Normal      Nitrite Urine Negative      Leukocyte Esterase Urine Negative      Bacteria Urine Few (*)     Mucus Urine Present (*)      RBC Urine 3 (*)     WBC Urine <1      Squamous Epithelials Urine <1     HCG QUALITATIVE PREGNANCY - Normal    hCG Serum Qualitative Negative     CBC WITH PLATELETS AND DIFFERENTIAL    WBC Count 6.5      RBC Count 3.94      Hemoglobin 12.6      Hematocrit 37.4      MCV 95      MCH 32.0      MCHC 33.7      RDW 12.5      Platelet Count 316      % Neutrophils 65      % Lymphocytes 23      % Monocytes 8      % Eosinophils 2      % Basophils 1      % Immature Granulocytes 1      NRBCs per 100 WBC 0      Absolute Neutrophils 4.2      Absolute Lymphocytes 1.5      Absolute Monocytes 0.5      Absolute Eosinophils 0.1      Absolute Basophils 0.0      Absolute Immature Granulocytes 0.1      Absolute NRBCs 0.0       Imaging   US Pelvic Complete w Transvaginal & Abd/Pel Duplex Limited   Final Result   IMPRESSION:     1.  No acute findings.   2.  Myometrial heterogeneity with indistinct endometrial-myometrial interface suggesting adenomyosis. MRI could be considered for confirmation, as indicated.   3.  Multilocular left ovarian cyst measuring up to 4.0 cm. Follow-up recommendations below:      PROBABLY BENIGN CYSTS:   (single thin (<3mm) septum, calcification in cyst wall, not classic to be described as hemorrhagic, endometrioma, or dermoid cyst).   Premenopausal/early post-menopausal (< 5 years from LMP):    <=7 cm: generally benign. Follow up in 6-12 weeks,       REFERENCE:   Management of Asymptomatic Ovarian and Other Adnexal Cysts Imaged at US: Society of Radiologists in Ultrasound Consensus Conference Statement. Radiology September 2010; [phone].      Simple Adnexal Cysts: SRU Consensus Conference Update on Follow-up and Reporting. Radiology September 2019. [phone].        Independent Interpretation   None    ED Course      Medications Administered   Medications   HYDROmorphone (DILAUDID) injection 1 mg (1 mg Intravenous $Given 9/1/24 8204)   ondansetron (ZOFRAN) injection 4 mg (4 mg Intravenous $Given 9/1/24  1829)   morphine (PF) injection 4 mg (4 mg Intravenous $Given 9/1/24 1406)   ondansetron (ZOFRAN) injection 4 mg (4 mg Intravenous $Given 9/1/24 1405)     Procedures   None     Discussion of Management   OB/GYN, Dr. Myhre  OB/GYN, Dr. Cain    ED Course   ED Course as of 09/01/24 1849   Sun Sep 01, 2024   1411 I obtained the history and examined the patient as noted above.      1602 I rechecked and updated the patient.      1707 I rechecked and updated the patient.      1815 I spoke with Dr. Myhre from OB/GYN regarding the patient's presentation and plan of care.      1846 I spoke with Dr. Cain from OB/GYN regarding the patient's presentation and plan of care.      1848 I rechecked and updated the patient.        Additional Documentation  None    Medical Decision Making / Diagnosis     CMS Diagnoses: None    MIPS       None    [facility]   Yvonne Diez is a 43 year old female with history ovarian cyst who presents with repeat evaluation for her left ovarian cyst.  She was seen here few days ago and diagnosed with a 4 cm ovarian cyst.  She has been using Tylenol and ibuprofen as well as oxycodone home for pain.  Her pain was difficult to control and required Dilaudid here.  Repeat ultrasound again confirmed the cyst but did not show any signs of torsion based on good blood flow.  Given the pain out of proportion, I contacted her gynecologist.  Unfortunately could not come to [facility] and she request that we contact our on-call OB/GYN.  I discussed the finding with .  She will assess the patient the ER before deciding whether patient will be evaluated operatively.  Currently she will just be admitted for observation for pain control.  I discussed the finding with the patient and she voiced understanding.  She agrees with treatment plan.  Patient admitted for pain control awaiting OB/GYN evaluation.    Disposition   The patient was admitted to the hospital.     Diagnosis     ICD-10-CM    1. Left ovarian  cyst  N83.202          Scribe Disclosure:  I, Yvonne Cochran, am serving as a scribe at 2:07 PM on 9/1/2024 to document services personally performed by Dawna Kang MD based on my observations and the provider's statements to me.        Dawna Kang MD  09/01/24 1959

## 2024-09-02 VITALS
TEMPERATURE: 97.9 F | HEART RATE: 60 BPM | OXYGEN SATURATION: 97 % | HEIGHT: 68 IN | WEIGHT: 211.5 LBS | BODY MASS INDEX: 32.05 KG/M2 | RESPIRATION RATE: 20 BRPM | DIASTOLIC BLOOD PRESSURE: 86 MMHG | SYSTOLIC BLOOD PRESSURE: 119 MMHG

## 2024-09-02 PROCEDURE — 250N000011 HC RX IP 250 OP 636: Performed by: INTERNAL MEDICINE

## 2024-09-02 PROCEDURE — 250N000013 HC RX MED GY IP 250 OP 250 PS 637: Performed by: INTERNAL MEDICINE

## 2024-09-02 PROCEDURE — 99213 OFFICE O/P EST LOW 20 MIN: CPT | Performed by: INTERNAL MEDICINE

## 2024-09-02 PROCEDURE — 96376 TX/PRO/DX INJ SAME DRUG ADON: CPT

## 2024-09-02 PROCEDURE — 250N000013 HC RX MED GY IP 250 OP 250 PS 637: Performed by: OBSTETRICS & GYNECOLOGY

## 2024-09-02 PROCEDURE — G0378 HOSPITAL OBSERVATION PER HR: HCPCS

## 2024-09-02 RX ORDER — PANTOPRAZOLE SODIUM 40 MG/1
40 TABLET, DELAYED RELEASE ORAL 2 TIMES DAILY
Status: DISCONTINUED | OUTPATIENT
Start: 2024-09-02 | End: 2024-09-02 | Stop reason: HOSPADM

## 2024-09-02 RX ORDER — GABAPENTIN 300 MG/1
300 CAPSULE ORAL 3 TIMES DAILY PRN
Status: DISCONTINUED | OUTPATIENT
Start: 2024-09-02 | End: 2024-09-02 | Stop reason: HOSPADM

## 2024-09-02 RX ORDER — DULOXETIN HYDROCHLORIDE 30 MG/1
30 CAPSULE, DELAYED RELEASE ORAL AT BEDTIME
Status: DISCONTINUED | OUTPATIENT
Start: 2024-09-02 | End: 2024-09-02 | Stop reason: HOSPADM

## 2024-09-02 RX ORDER — DULOXETIN HYDROCHLORIDE 20 MG/1
60 CAPSULE, DELAYED RELEASE ORAL AT BEDTIME
Status: DISCONTINUED | OUTPATIENT
Start: 2024-09-02 | End: 2024-09-02 | Stop reason: HOSPADM

## 2024-09-02 RX ORDER — CLONAZEPAM 0.5 MG/1
0.5 TABLET ORAL 2 TIMES DAILY PRN
Status: DISCONTINUED | OUTPATIENT
Start: 2024-09-02 | End: 2024-09-02 | Stop reason: HOSPADM

## 2024-09-02 RX ORDER — POLYETHYLENE GLYCOL 3350 17 G/17G
17 POWDER, FOR SOLUTION ORAL DAILY
Status: DISCONTINUED | OUTPATIENT
Start: 2024-09-02 | End: 2024-09-02 | Stop reason: HOSPADM

## 2024-09-02 RX ORDER — OXYCODONE HYDROCHLORIDE 10 MG/1
10 TABLET ORAL EVERY 6 HOURS PRN
Qty: 20 TABLET | Refills: 0 | Status: SHIPPED | OUTPATIENT
Start: 2024-09-02

## 2024-09-02 RX ORDER — ACETAMINOPHEN 500 MG
1000 TABLET ORAL EVERY 6 HOURS PRN
Status: DISCONTINUED | OUTPATIENT
Start: 2024-09-02 | End: 2024-09-02 | Stop reason: HOSPADM

## 2024-09-02 RX ORDER — TRAZODONE HYDROCHLORIDE 50 MG/1
100 TABLET, FILM COATED ORAL AT BEDTIME
Status: DISCONTINUED | OUTPATIENT
Start: 2024-09-02 | End: 2024-09-02 | Stop reason: HOSPADM

## 2024-09-02 RX ORDER — BUPROPION HYDROCHLORIDE 100 MG/1
200 TABLET, EXTENDED RELEASE ORAL DAILY
Status: DISCONTINUED | OUTPATIENT
Start: 2024-09-02 | End: 2024-09-02 | Stop reason: HOSPADM

## 2024-09-02 RX ADMIN — ONDANSETRON 4 MG: 4 TABLET, ORALLY DISINTEGRATING ORAL at 09:43

## 2024-09-02 RX ADMIN — POLYETHYLENE GLYCOL 3350 17 G: 17 POWDER, FOR SOLUTION ORAL at 09:44

## 2024-09-02 RX ADMIN — IBUPROFEN 600 MG: 600 TABLET, FILM COATED ORAL at 03:02

## 2024-09-02 RX ADMIN — ACETAMINOPHEN 325MG 650 MG: 325 TABLET ORAL at 12:25

## 2024-09-02 RX ADMIN — OXYCODONE 10 MG: 10 TABLET ORAL at 06:10

## 2024-09-02 RX ADMIN — HYDROMORPHONE HYDROCHLORIDE 0.5 MG: 1 INJECTION, SOLUTION INTRAMUSCULAR; INTRAVENOUS; SUBCUTANEOUS at 03:05

## 2024-09-02 RX ADMIN — GABAPENTIN 300 MG: 300 CAPSULE ORAL at 12:25

## 2024-09-02 RX ADMIN — PANTOPRAZOLE SODIUM 40 MG: 40 TABLET, DELAYED RELEASE ORAL at 00:57

## 2024-09-02 RX ADMIN — GABAPENTIN 300 MG: 300 CAPSULE ORAL at 03:04

## 2024-09-02 RX ADMIN — CLONAZEPAM 0.5 MG: 0.5 TABLET ORAL at 06:10

## 2024-09-02 RX ADMIN — DOCUSATE SODIUM 100 MG: 100 CAPSULE, LIQUID FILLED ORAL at 09:43

## 2024-09-02 RX ADMIN — BUPROPION HYDROCHLORIDE 200 MG: 100 TABLET, FILM COATED, EXTENDED RELEASE ORAL at 09:49

## 2024-09-02 RX ADMIN — IBUPROFEN 600 MG: 600 TABLET, FILM COATED ORAL at 09:43

## 2024-09-02 RX ADMIN — HYDROMORPHONE HYDROCHLORIDE 0.5 MG: 1 INJECTION, SOLUTION INTRAMUSCULAR; INTRAVENOUS; SUBCUTANEOUS at 09:44

## 2024-09-02 RX ADMIN — TRAZODONE HYDROCHLORIDE 100 MG: 50 TABLET ORAL at 00:57

## 2024-09-02 RX ADMIN — OXYCODONE 10 MG: 10 TABLET ORAL at 00:56

## 2024-09-02 RX ADMIN — PANTOPRAZOLE SODIUM 40 MG: 40 TABLET, DELAYED RELEASE ORAL at 09:44

## 2024-09-02 RX ADMIN — DULOXETINE HYDROCHLORIDE 30 MG: 30 CAPSULE, DELAYED RELEASE ORAL at 00:57

## 2024-09-02 RX ADMIN — HYDROXYZINE HYDROCHLORIDE 25 MG: 25 TABLET ORAL at 09:43

## 2024-09-02 RX ADMIN — OXYCODONE 10 MG: 10 TABLET ORAL at 12:25

## 2024-09-02 RX ADMIN — DULOXETINE HYDROCHLORIDE 60 MG: 20 CAPSULE, DELAYED RELEASE ORAL at 00:56

## 2024-09-02 RX ADMIN — ACETAMINOPHEN 325MG 650 MG: 325 TABLET ORAL at 06:10

## 2024-09-02 ASSESSMENT — ACTIVITIES OF DAILY LIVING (ADL)
ADLS_ACUITY_SCORE: 20
ADLS_ACUITY_SCORE: 20
ADLS_ACUITY_SCORE: 21
ADLS_ACUITY_SCORE: 20

## 2024-09-02 NOTE — PLAN OF CARE
Pt arrived to floor, from ED, at this time. Pt transported via cart and escorted by ED staff. Spouse and children arrived shortly after. Pt oriented to room and floor, as well as plan of care. Pt requesting to shower. Pt requesting to take home medications of Cymbalta and Protonix. Dr Cain paged and verbal order received to place Hospitalist consult order in. Awaiting medication review. Will continue to monitor pts comfort level and provide interventions as needed.

## 2024-09-02 NOTE — CONSULTS
[facility]  Hospitalist Consult Note  Name: Yvonne Diez    MRN: [id number]  YOB: 1980    Age: 43 year old  Date of admission: 9/1/2024  Primary care provider: Brandee Harper     Requesting Physician:  Coni Blackwell MD  Reason for consult: medical management         Assessment and Plan:   Yvonne Diez is a 43 year old female with a history of depression/anxiety, endometriosis who was admitted for LLQ pain to Gyn service with ovarian cyst.  We are asked to provide medical management for her chronic medical problems     Problem List:  LLQ abdominal pain   Defer further w/up and treatment to primary service     Depression with anxiety   Resumed pta bupropion, gabapentin, duloxetine, trazodone and prn clonazepam    Endometriosis   Defer to gyn service    Thank you for the consultation, we will continue to follow along during the hospitalization. Please page with any questions or concerns.         History of Present Illness:   Yvonne Diez is a 43 year old female with a history of depression/anxiety, endometriosis who was admitted for LLQ pain to Gyn service with ovarian cyst.  We are asked to provide medical management for her chronic medical problems                 Past Medical History:     Past Medical History:   Diagnosis Date    Anxiety     Depressive disorder     Endometriosis              Past Surgical History:     Past Surgical History:   Procedure Laterality Date    BIOPSY      BLOOD PATCH N/A 04/26/2017    Procedure: EPIDURAL BLOOD PATCH;  EPIDURAL BLOOD PATCH;  Surgeon: GENERIC ANESTHESIA PROVIDER;  Location: [facility] OR    COLONOSCOPY      GYN SURGERY      [facility] LAPAROSCOPY, SURGICAL, ABDOMEN, PERITONEUM & OMENTUM; DX W/ OR W/O SPECIMEN(S)  01/01/2006    Laparoscopy, diagnostic    [facility] GENERAL SURG (ABSTRACTED)  01/01/2001    wisdom  teeth               Social History:     Social History     Tobacco Use    Smoking status: Former     Current packs/day: 0.00      Types: Cigarettes     Start date: 2006     Quit date: 2011     Years since quittin.7     Passive exposure: Never    Smokeless tobacco: Never    Tobacco comments:     Smoked 1 pack/week   Substance Use Topics    Alcohol use: Yes     Types: 8 Standard drinks or equivalent per week     Comment: 3 x week             Family History:   Family History   Family history reviewed with patient and is noncontributory.          Allergies:     Allergies   Allergen Reactions    Sulfa Antibiotics Hives     Mother said this happened as a child             Medications:     Prior to Admission medications    Medication Sig Last Dose Taking? Auth Provider Long Term End Date   acetaminophen (TYLENOL) 500 MG tablet Take 1,000 mg by mouth every 6 hours as needed for mild pain. Unknown at PRN Yes Reported, Patient     buPROPion (WELLBUTRIN SR) 200 MG 12 hr tablet Take 200 mg by mouth daily. 2024 at AM Yes Unknown, Entered By History No    CALCIUM-VITAMIN D PO Take 1 tablet by mouth daily. 2024 at AM Yes Unknown, Entered By History     clonazePAM (KLONOPIN) 0.5 MG tablet Take 1 tablet (0.5 mg) by mouth 2 times daily as needed for anxiety Unknown at PRN Yes Brandee Harper MD Yes    DULoxetine (CYMBALTA) 30 MG capsule Take 30 mg by mouth at bedtime. 2024 at PM Yes Unknown, Entered By History Yes    DULoxetine (CYMBALTA) 60 MG capsule Take 60 mg by mouth at bedtime. 2024 at PM Yes Reported, Patient Yes    gabapentin (NEURONTIN) 300 MG capsule Take 300 mg by mouth 3 times daily as needed. 2024 at AM Yes Unknown, Entered By History Yes    hyoscyamine (LEVSIN) 0.125 MG tablet TAKE 1 TABLET(125 MCG) BY MOUTH EVERY 4 HOURS AS NEEDED FOR CRAMPING Unknown at PRN Yes Brandee Harper MD     ibuprofen (ADVIL/MOTRIN) 200 MG capsule Take 400-600 mg by mouth every 4 hours as needed. 2 tablets as needed for pain Unknown at PRN Yes Reported, Patient     loratadine (CLARITIN) 10 MG tablet Take 10 mg by mouth daily  "9/1/2024 at AM Yes Reported, Patient     Multiple Vitamins-Minerals (MULTI MAX PO) Take 1 tablet by mouth daily. 9/1/2024 at AM Yes Reported, Patient     pantoprazole (PROTONIX) 40 MG EC tablet Take 40 mg by mouth 2 times daily. 9/1/2024 at AM Yes Unknown, Entered By History     polyethylene glycol (MIRALAX) 17 GM/Dose powder Take 1 Capful by mouth daily. 9/1/2024 at AM Yes Unknown, Entered By History     traZODone (DESYREL) 100 MG tablet Take 100 mg by mouth at bedtime. 8/31/2024 at PM Yes Reported, Patient         Current hospital administered medication list (MAR) also reviewed.          Review of Systems:     A comprehensive greater than 10 system review of systems was carried out.  Pertinent positives and negatives are noted above.  Otherwise negative for contributory info.            Physical Exam:   Blood pressure 114/70, pulse 62, temperature 97.5  F (36.4  C), temperature source Oral, resp. rate 18, height 1.727 m (5' 8\"), weight 95.9 kg (211 lb 8 oz), SpO2 96%, not currently breastfeeding.  Exam:  GENERAL: No apparent distress. Awake, alert, and fully oriented.  HEENT: Normocephalic, atraumatic. Extraocular movements intact.  CARDIOVASCULAR: Regular rate and rhythm without murmurs or rubs. No S3.  PULMONARY: Clear bilaterally.  ABDOMINAL: deferred  EXTREMITIES: No cyanosis or clubbing. No edema.  NEUROLOGICAL: CN 2-12 grossly intact, no focal neurological deficits.  DERMATOLOGICAL: No rash, ulcer, ecchymoses, jaundice.         Data:   Imaging:  Personally reviewed.    EKG/Telemetry:  Personally reviewed.    Labs: Personally reviewed.   Recent Labs   Lab 09/01/24  1410 08/29/24  1012   WBC 6.5 5.8   HGB 12.6 12.3   HCT 37.4 37.0   MCV 95 96    294     Recent Labs   Lab 09/01/24  1410 08/29/24  1012    138   POTASSIUM 4.3 4.1   CHLORIDE 101 102   CO2 26 25   ANIONGAP 12 11   * 94   BUN 9.8 6.7   CR 0.92 0.85   GFRESTIMATED 80 88   TI 10.0 8.7*     Recent Labs   Lab 09/01/24  1456   COLOR " Light Yellow   APPEARANCE Clear   URINEGLC Negative   URINEBILI Negative   URINEKETONE Negative   SG 1.012   UBLD Negative   URINEPH 7.0   PROTEIN Negative   NITRITE Negative   LEUKEST Negative   RBCU 3*   WBCU <1       Pelvic US  No acute findings.  Myometrial heterogeneity with indistinct endometrial-myometrial interface suggesting adenomyosis. MRI could be considered for confirmation, as indicated.  Multilocular left ovarian cyst measuring up to 4.0 cm. Follow-up recommendations below:  Evette Patel MD  [facility] Hospitalist

## 2024-09-02 NOTE — PHARMACY-ADMISSION MEDICATION HISTORY
Pharmacy Intern Admission Medication History    Admission medication history is complete. The information provided in this note is only as accurate as the sources available at the time of the update.    Information Source(s): Patient and CareEverywhere/SureScripts via in-person    Pertinent Information: None    Changes made to PTA medication list:  Added: Gabapentin, pantoprazole, MIRALAX  Deleted: cyclobenzaprine, ketoconazole, omeprazole, oxycodone, CYMBALTA 30 mg  Changed:   Trazodone 50 mg PRN->100 mg at bedtime  Wellbutrin 150 mg XL->200 mg SR   Tylenol 325 mg->500 mg  Ibuprofen 1 cap->2-3 caps PRN    Allergies reviewed with patient and updates made in EHR: yes    Medication History Completed By: Frankie Watkins 9/1/2024 7:57 PM    PTA Med List   Medication Sig Last Dose    acetaminophen (TYLENOL) 500 MG tablet Take 1,000 mg by mouth every 6 hours as needed for mild pain. Unknown at PRN    buPROPion (WELLBUTRIN SR) 200 MG 12 hr tablet Take 200 mg by mouth daily. 9/1/2024 at AM    CALCIUM-VITAMIN D PO Take 1 tablet by mouth daily. 9/1/2024 at AM    clonazePAM (KLONOPIN) 0.5 MG tablet Take 1 tablet (0.5 mg) by mouth 2 times daily as needed for anxiety Unknown at PRN    DULoxetine (CYMBALTA) 30 MG capsule Take 30 mg by mouth at bedtime. 8/31/2024 at PM    DULoxetine (CYMBALTA) 60 MG capsule Take 60 mg by mouth at bedtime. 8/31/2024 at PM    gabapentin (NEURONTIN) 300 MG capsule Take 300 mg by mouth 3 times daily as needed. 9/1/2024 at AM    hyoscyamine (LEVSIN) 0.125 MG tablet TAKE 1 TABLET(125 MCG) BY MOUTH EVERY 4 HOURS AS NEEDED FOR CRAMPING Unknown at PRN    ibuprofen (ADVIL/MOTRIN) 200 MG capsule Take 400-600 mg by mouth every 4 hours as needed. 2 tablets as needed for pain Unknown at PRN    loratadine (CLARITIN) 10 MG tablet Take 10 mg by mouth daily 9/1/2024 at AM    Multiple Vitamins-Minerals (MULTI MAX PO) Take 1 tablet by mouth daily. 9/1/2024 at AM    pantoprazole (PROTONIX) 40 MG EC tablet Take 40 mg by  mouth 2 times daily. 9/1/2024 at AM    polyethylene glycol (MIRALAX) 17 GM/Dose powder Take 1 Capful by mouth daily. 9/1/2024 at AM    traZODone (DESYREL) 100 MG tablet Take 100 mg by mouth at bedtime. 8/31/2024 at PM

## 2024-09-02 NOTE — PLAN OF CARE
"Goal Outcome Evaluation:      Plan of Care Reviewed With: patient, spouse    Overall Patient Progress: improvingOverall Patient Progress: improving  VSS. Alert and oriented. Afebrile. IND. Rating pain 3-7/10. Pain managed with Dilaudid x1, Oxycodone, tylenol, gabapentin and atarax. Pt intermittently using heat on abdomen. Voding WDL. +gas. Intermittent nausea; zofran x1 given. Went over discharge instructions. Questions and concerns addressed. Pt verbalized understanding of instructions and will follow up with primary Ob-gyn . PIV removed at time of discharge. Pt received home meds and discharged home with spouse. No concerns at this time.    Problem: Adult Inpatient Plan of Care  Goal: Plan of Care Review  Description: The Plan of Care Review/Shift note should be completed every shift.  The Outcome Evaluation is a brief statement about your assessment that the patient is improving, declining, or no change.  This information will be displayed automatically on your shift  note.  9/2/2024 1301 by Giselle Lopez RN  Outcome: Adequate for Care Transition  9/2/2024 1301 by Giselle Lopez RN  Outcome: Progressing  Flowsheets (Taken 9/2/2024 1301)  Plan of Care Reviewed With:   patient   spouse  Overall Patient Progress: improving  Goal: Patient-Specific Goal (Individualized)  Description: You can add care plan individualizations to a care plan. Examples of Individualization might be:  \"Parent requests to be called daily at 9am for status\", \"I have a hard time hearing out of my right ear\", or \"Do not touch me to wake me up as it startles  me\".  9/2/2024 1301 by Giselle Lopez, ADRIANNE  Outcome: Adequate for Care Transition  9/2/2024 1301 by Giselle Lopez, RN  Outcome: Progressing  Goal: Absence of Hospital-Acquired Illness or Injury  9/2/2024 1301 by Giselle Lopez, ADRIANNE  Outcome: Adequate for Care Transition  9/2/2024 1301 by Giselle Lopez, RN  Outcome: Progressing  Intervention: Identify and Manage " Fall Risk  Recent Flowsheet Documentation  Taken 9/2/2024 0940 by Giselle Lopez RN  Safety Promotion/Fall Prevention:   clutter free environment maintained   mobility aid in reach   nonskid shoes/slippers when out of bed   patient and family education   room near nurse's station   room organization consistent   safety round/check completed  Intervention: Prevent Skin Injury  Recent Flowsheet Documentation  Taken 9/2/2024 0940 by Giselle Lopez RN  Body Position: position changed independently  Skin Protection: adhesive use limited  Device Skin Pressure Protection:   adhesive use limited   tubing/devices free from skin contact  Intervention: Prevent and Manage VTE (Venous Thromboembolism) Risk  Recent Flowsheet Documentation  Taken 9/2/2024 0940 by Giselle Lopez RN  VTE Prevention/Management: (ambulatory) SCDs off (sequential compression devices)  Intervention: Prevent Infection  Recent Flowsheet Documentation  Taken 9/2/2024 0940 by Giselle Lopez RN  Infection Prevention:   equipment surfaces disinfected   hand hygiene promoted   rest/sleep promoted   single patient room provided  Goal: Optimal Comfort and Wellbeing  9/2/2024 1301 by Giselle Lopez RN  Outcome: Adequate for Care Transition  9/2/2024 1301 by Giselle Lopez RN  Outcome: Progressing  Intervention: Monitor Pain and Promote Comfort  Recent Flowsheet Documentation  Taken 9/2/2024 1225 by Giselle Lopez RN  Pain Management Interventions:   medication (see MAR)   heat applied  Taken 9/2/2024 0940 by Giselle Lopez RN  Pain Management Interventions:   medication (see MAR)   heat applied  Goal: Readiness for Transition of Care  9/2/2024 1301 by Giselle Lopez RN  Outcome: Adequate for Care Transition  9/2/2024 1301 by Giselle Lopez RN  Outcome: Progressing

## 2024-09-02 NOTE — DISCHARGE SUMMARY
[facility] Discharge Summary    Yvonne Diez MRN# [id number]   Age: 43 year old YOB: 1980     Date of Admission:  9/1/2024  Date of Discharge::  9/2/2024  Admitting Physician:  Coni Blackwell MD  Discharge Physician:  Justa Vieyra MD     Home clinic: [facility]          Admission Diagnoses:   Left ovarian cyst [N83.202]  Endometriosis  Pelvic pain          Discharge Diagnosis:     Same          Procedures:     Procedure(s): Pain control       No procedures performed during this admission           Medications Prior to Admission:     Medications Prior to Admission   Medication Sig Dispense Refill Last Dose    acetaminophen (TYLENOL) 500 MG tablet Take 1,000 mg by mouth every 6 hours as needed for mild pain.   Unknown at PRN    buPROPion (WELLBUTRIN SR) 200 MG 12 hr tablet Take 200 mg by mouth daily.   9/1/2024 at AM    CALCIUM-VITAMIN D PO Take 1 tablet by mouth daily.   9/1/2024 at AM    clonazePAM (KLONOPIN) 0.5 MG tablet Take 1 tablet (0.5 mg) by mouth 2 times daily as needed for anxiety 16 tablet 0 Unknown at PRN    DULoxetine (CYMBALTA) 30 MG capsule Take 30 mg by mouth at bedtime.   8/31/2024 at PM    DULoxetine (CYMBALTA) 60 MG capsule Take 60 mg by mouth at bedtime.   8/31/2024 at PM    gabapentin (NEURONTIN) 300 MG capsule Take 300 mg by mouth 3 times daily as needed.   9/1/2024 at AM    hyoscyamine (LEVSIN) 0.125 MG tablet TAKE 1 TABLET(125 MCG) BY MOUTH EVERY 4 HOURS AS NEEDED FOR CRAMPING 90 tablet 3 Unknown at PRN    ibuprofen (ADVIL/MOTRIN) 200 MG capsule Take 400-600 mg by mouth every 4 hours as needed. 2 tablets as needed for pain   Unknown at PRN    loratadine (CLARITIN) 10 MG tablet Take 10 mg by mouth daily   9/1/2024 at AM    Multiple Vitamins-Minerals (MULTI MAX PO) Take 1 tablet by mouth daily.   9/1/2024 at AM    pantoprazole (PROTONIX) 40 MG EC tablet Take 40 mg by mouth 2 times daily.   9/1/2024 at AM    polyethylene glycol (MIRALAX) 17  GM/Dose powder Take 1 Capful by mouth daily.   9/1/2024 at AM    traZODone (DESYREL) 100 MG tablet Take 100 mg by mouth at bedtime.   8/31/2024 at PM             Discharge Medications:     Current Discharge Medication List        START taking these medications    Details   oxyCODONE IR (ROXICODONE) 10 MG tablet Take 1 tablet (10 mg) by mouth every 6 hours as needed for severe pain (IF pain not managed with non-pharmacological and non-opioid interventions).  Qty: 20 tablet, Refills: 0    Associated Diagnoses: Endometriosis           CONTINUE these medications which have NOT CHANGED    Details   acetaminophen (TYLENOL) 500 MG tablet Take 1,000 mg by mouth every 6 hours as needed for mild pain.      buPROPion (WELLBUTRIN SR) 200 MG 12 hr tablet Take 200 mg by mouth daily.      CALCIUM-VITAMIN D PO Take 1 tablet by mouth daily.      clonazePAM (KLONOPIN) 0.5 MG tablet Take 1 tablet (0.5 mg) by mouth 2 times daily as needed for anxiety  Qty: 16 tablet, Refills: 0    Associated Diagnoses: Generalized anxiety disorder      !! DULoxetine (CYMBALTA) 30 MG capsule Take 30 mg by mouth at bedtime.      !! DULoxetine (CYMBALTA) 60 MG capsule Take 60 mg by mouth at bedtime.      gabapentin (NEURONTIN) 300 MG capsule Take 300 mg by mouth 3 times daily as needed.      hyoscyamine (LEVSIN) 0.125 MG tablet TAKE 1 TABLET(125 MCG) BY MOUTH EVERY 4 HOURS AS NEEDED FOR CRAMPING  Qty: 90 tablet, Refills: 3    Comments: Profile Rx: patient will contact pharmacy when needed  Associated Diagnoses: Abdominal cramping      ibuprofen (ADVIL/MOTRIN) 200 MG capsule Take 400-600 mg by mouth every 4 hours as needed. 2 tablets as needed for pain      loratadine (CLARITIN) 10 MG tablet Take 10 mg by mouth daily      Multiple Vitamins-Minerals (MULTI MAX PO) Take 1 tablet by mouth daily.      pantoprazole (PROTONIX) 40 MG EC tablet Take 40 mg by mouth 2 times daily.      polyethylene glycol (MIRALAX) 17 GM/Dose powder Take 1 Capful by mouth daily.       traZODone (DESYREL) 100 MG tablet Take 100 mg by mouth at bedtime.       !! - Potential duplicate medications found. Please discuss with provider.                Consultations:   No consultations were requested during this admission          Brief History of Labor or Admission:   Yvonne Diez is a 43 year old female  who presents with significant PMH for endometriosis diagnosed back when she was 19 yo and significant gyn surgical hx consistent with the following procedures  - two laparoscopic endometriosis ablations  - 2  sections  - 1 laparoscopic peritoneal stripping 2024     Today presenting to the ED for second time for acute LLQ pain.  States that initially her pain started last 24 at which point she presented to the ED due to sharp shooting pain localized in her LLQ abdominal area rated 10/10. She had a pelvic ultrasound that showed the following findings     ULTRASOUND PELVIS DOPPLER   WITH TRANSVAGINAL IMAGING  2024 12:11  PM      HISTORY: Pelvic pain, left sided     COMPARISON: None.     TECHNIQUE: Endovaginal sonography was added to the transabdominal  scans. Grayscale, color Doppler, and Doppler spectral waveform  analysis performed.     FINDINGS:  No fibroids are evident. The uterus is 8.0 x 4.4 x 3.5  centimeters. Endometrial stripe measures 4 mm and is normal for  patient's age and menstrual status. IUD centrally positioned. The  right ovary is normal. The left ovary demonstrates a 3.9 cm simple  cyst. Doppler spectral waveform analysis demonstrates blood flow to  both ovaries. No adnexal masses are present. No free pelvic fluid is  present.                                                                      IMPRESSION:   1. No torsion demonstrated.  2. 3.9 cm simple left ovarian cyst.     She was discharged home with pain control regimen with ibuprofen and oxycodone. States that her pain has moderately controlled taking ibuprofen 600 mg once or twice a  "day and oxycodone 5 to 10 mg when pain rated at or higher than 8/10. She states she has been taking oxycodone daily since last Thursday. States that her last dose of oxycodone was yesterday at 2200 hrs and since then she has not had any other narcotics. States that her pain this early afternoon worsened again and decided to come in for further evaluation.      States that she is aware ovarian torsion is a potential risk and that even sometimes when ultrasound is negative intermittent torsion could be causing her pain however, she really hopes to get her pain under control with medication so she can get specialized surgery that she admits she has been pushing out since she was trying to graduate and avoid surgery this summer. She has been looking into having a hysterectomy with her primary Gyn and a specialized minimal invasive surgeon that did her peritoneal stripping last year. States that she has been told she needed surgery last year. States she feels guilty she didn't do it earlier and now she is in this situation. Pt states \"I am not a drug seeker, I just want to give it a try to pain medications before another scope that could potentially lead me into a colostomy\".      States that while during her stay at the ED her pain went down from 10/10 to 4/10 only after dilaudid IV was given.      Currently laying over her left side, states that applying heat and mild pressure over LLQ feels \"good\". States that there is no specific movement or any other associations that she can recall making her pain worse. Pt currently not sexually active. Pt has mIUD in place since 7/2023.           Hospital Course:   The patient's hospital course was unremarkable.  On discharge, her pain was well controlled. Voiding without difficulty.  Ambulating well and tolerating a normal diet.  No fever or significant wound drainage. No bowel movement yet.  She was discharged on hospital day 1.    Post-partum hemoglobin:   Hemoglobin   Date " Value Ref Range Status   09/01/2024 12.6 11.7 - 15.7 g/dL Final   04/22/2017 12.6 11.7 - 15.7 g/dL Final             Discharge Instructions and Follow-Up:     Discharge diet: Regular   Discharge activity: Activity as tolerated   Discharge follow-up: Follow up with primary care provider in 5 days   Wound care: Drink plenty of fluids  Ice to area for comfort           Discharge Disposition:     Discharged to home       Justa Vieyra MD

## 2024-09-02 NOTE — H&P
[facility]    Consult Note  Obstetrics and Gynecology     Date of Admission:  2024  Date of Service (when I saw the patient): 24    Primary Care Physician   Brandee Estrada    Reason for Consult   Reason for consult: LLQ pain, left ovarian cyst, endometriosis    Chief Complaint   LLQ pain      History is obtained from the patient    History of Present Illness   Yvonne Diez is a 43 year old female  who presents with significant PMH for endometriosis diagnosed back when she was 21 yo and significant gyn surgical hx consistent with the following procedures  - two laparoscopic endometriosis ablations  - 2  sections  - 1 laparoscopic peritoneal stripping 2024    Today presenting to the ED for second time for acute LLQ pain.  States that initially her pain started last 24 at which point she presented to the ED due to sharp shooting pain localized in her LLQ abdominal area rated 10/10. She had a pelvic ultrasound that showed the following findings    ULTRASOUND PELVIS DOPPLER   WITH TRANSVAGINAL IMAGING  2024 12:11  PM      HISTORY: Pelvic pain, left sided     COMPARISON: None.     TECHNIQUE: Endovaginal sonography was added to the transabdominal  scans. Grayscale, color Doppler, and Doppler spectral waveform  analysis performed.     FINDINGS:  No fibroids are evident. The uterus is 8.0 x 4.4 x 3.5  centimeters. Endometrial stripe measures 4 mm and is normal for  patient's age and menstrual status. IUD centrally positioned. The  right ovary is normal. The left ovary demonstrates a 3.9 cm simple  cyst. Doppler spectral waveform analysis demonstrates blood flow to  both ovaries. No adnexal masses are present. No free pelvic fluid is  present.                                                                      IMPRESSION:   1. No torsion demonstrated.  2. 3.9 cm simple left ovarian cyst.    She was discharged home with pain control regimen with  "ibuprofen and oxycodone. States that her pain has moderately controlled taking ibuprofen 600 mg once or twice a day and oxycodone 5 to 10 mg when pain rated at or higher than 8/10. She states she has been taking oxycodone daily since last Thursday. States that her last dose of oxycodone was yesterday at 2200 hrs and since then she has not had any other narcotics. States that her pain this early afternoon worsened again and decided to come in for further evaluation.     States that she is aware ovarian torsion is a potential risk and that even sometimes when ultrasound is negative intermittent torsion could be causing her pain however, she really hopes to get her pain under control with medication so she can get specialized surgery that she admits she has been pushing out since she was trying to graduate and avoid surgery this summer. She has been looking into having a hysterectomy with her primary Gyn and a specialized minimal invasive surgeon that did her peritoneal stripping last year. States that she has been told she needed surgery last year. States she feels guilty she didn't do it earlier and now she is in this situation. Pt states \"I am not a drug seeker, I just want to give it a try to pain medications before another scope that could potentially lead me into a colostomy\".     States that while during her stay at the ED her pain went down from 10/10 to 4/10 only after dilaudid IV was given.     Currently laying over her left side, states that applying heat and mild pressure over LLQ feels \"good\". States that there is no specific movement or any other associations that she can recall making her pain worse. Pt currently not sexually active. Pt has mIUD in place since 7/2023.      Past Medical History    I have reviewed this patient's medical history and updated it with pertinent information if needed.   Past Medical History:   Diagnosis Date    Anxiety     Depressive disorder     Endometriosis        Past " Surgical History   I have reviewed this patient's surgical history and updated it with pertinent information if needed.  Past Surgical History:   Procedure Laterality Date    BIOPSY      BLOOD PATCH N/A 04/26/2017    Procedure: EPIDURAL BLOOD PATCH;  EPIDURAL BLOOD PATCH;  Surgeon: GENERIC ANESTHESIA PROVIDER;  Location: [facility] OR    COLONOSCOPY      GYN SURGERY      [facility] LAPAROSCOPY, SURGICAL, ABDOMEN, PERITONEUM & OMENTUM; DX W/ OR W/O SPECIMEN(S)  01/01/2006    Laparoscopy, diagnostic    [facility] GENERAL SURG (ABSTRACTED)  01/01/2001    wisdom  teeth       Prior to Admission Medications   Prior to Admission Medications   Prescriptions Last Dose Informant Patient Reported? Taking?   CALCIUM-VITAMIN D PO 9/1/2024 at AM  Yes Yes   Sig: Take 1 tablet by mouth daily.   DULoxetine (CYMBALTA) 30 MG capsule 8/31/2024 at PM  Yes Yes   Sig: Take 30 mg by mouth at bedtime.   DULoxetine (CYMBALTA) 60 MG capsule 8/31/2024 at PM  Yes Yes   Sig: Take 60 mg by mouth at bedtime.   Multiple Vitamins-Minerals (MULTI MAX PO) 9/1/2024 at AM  Yes Yes   Sig: Take 1 tablet by mouth daily.   acetaminophen (TYLENOL) 500 MG tablet Unknown at PRN  Yes Yes   Sig: Take 1,000 mg by mouth every 6 hours as needed for mild pain.   buPROPion (WELLBUTRIN SR) 200 MG 12 hr tablet 9/1/2024 at AM  Yes Yes   Sig: Take 200 mg by mouth daily.   clonazePAM (KLONOPIN) 0.5 MG tablet Unknown at PRN  No Yes   Sig: Take 1 tablet (0.5 mg) by mouth 2 times daily as needed for anxiety   gabapentin (NEURONTIN) 300 MG capsule 9/1/2024 at AM  Yes Yes   Sig: Take 300 mg by mouth 3 times daily as needed.   hyoscyamine (LEVSIN) 0.125 MG tablet Unknown at PRN  No Yes   Sig: TAKE 1 TABLET(125 MCG) BY MOUTH EVERY 4 HOURS AS NEEDED FOR CRAMPING   ibuprofen (ADVIL/MOTRIN) 200 MG capsule Unknown at PRN  Yes Yes   Sig: Take 400-600 mg by mouth every 4 hours as needed. 2 tablets as needed for pain   loratadine (CLARITIN) 10 MG tablet 9/1/2024 at AM  Yes Yes   Sig: Take 10 mg by mouth daily    pantoprazole (PROTONIX) 40 MG EC tablet 9/1/2024 at AM  Yes Yes   Sig: Take 40 mg by mouth 2 times daily.   polyethylene glycol (MIRALAX) 17 GM/Dose powder 9/1/2024 at AM  Yes Yes   Sig: Take 1 Capful by mouth daily.   traZODone (DESYREL) 100 MG tablet 8/31/2024 at PM  Yes Yes   Sig: Take 100 mg by mouth at bedtime.      Facility-Administered Medications: None     Allergies   Allergies   Allergen Reactions    Sulfa Antibiotics Hives     Mother said this happened as a child       Social History   I have reviewed this patient's social history and updated it with pertinent information if needed. Yvonne Diez  reports that she quit smoking about 12 years ago. Her smoking use included cigarettes. She started smoking about 17 years ago. She has never been exposed to tobacco smoke. She has never used smokeless tobacco. She reports current alcohol use. She reports that she does not use drugs.    Family History   I have reviewed this patient's family history and updated it with pertinent information if needed.   Family History   Problem Relation Age of Onset    Endocrine Disease Mother         Lupus    Connective Tissue Disorder Mother     Hypertension Mother     Cancer Maternal Grandmother         lung    Prostate Cancer Maternal Grandfather     Musculoskeletal Disorder Paternal Uncle         Jessica cotton disease       Review of Systems   The 10 point Review of Systems is negative other than noted in the HPI or here.     Physical Exam   Temp: 97.6  F (36.4  C) Temp src: Temporal BP: 110/61 Pulse: 60   Resp: 18 SpO2: 95 % O2 Device: None (Room air)    Vital Signs with Ranges  Temp:  [97.6  F (36.4  C)] 97.6  F (36.4  C)  Pulse:  [60-94] 60  Resp:  [18] 18  BP: (110-149)/(61-92) 110/61  SpO2:  [93 %-100 %] 95 %  215 lbs 13.29 oz    Constitutional: Patient is alert.   HENT:   Mouth/Throat: Moist mucous membranes.  Eyes: EOM are normal.  Neck: normal ROM.   Cardiovascular: Warm and well perfused.   Pulmonary/Chest:  Effort normal.   Abdominal: Non-distended. Pain on deep palpation over LLQ, voluntary guarding, no rebound.   Musculoskeletal: Normal range of motion.   Neurological: Moving all extremities.    Skin: Skin is warm and dry.   Psychiatric: Normal affect.        Data   Results for orders placed or performed during the hospital encounter of 09/01/24 (from the past 24 hour(s))   CBC with platelets differential    Narrative    The following orders were created for panel order CBC with platelets differential.  Procedure                               Abnormality         Status                     ---------                               -----------         ------                     CBC with platelets and d...[[id number]]                      Final result                 Please view results for these tests on the individual orders.   Basic metabolic panel (BMP)   Result Value Ref Range    Sodium 139 135 - 145 mmol/L    Potassium 4.3 3.4 - 5.3 mmol/L    Chloride 101 98 - 107 mmol/L    Carbon Dioxide (CO2) 26 22 - 29 mmol/L    Anion Gap 12 7 - 15 mmol/L    Urea Nitrogen 9.8 6.0 - 20.0 mg/dL    Creatinine 0.92 0.51 - 0.95 mg/dL    GFR Estimate 80 >60 mL/min/1.73m2    Calcium 10.0 8.8 - 10.4 mg/dL    Glucose 106 (H) 70 - 99 mg/dL   HCG QUALitative pregnancy (blood)   Result Value Ref Range    hCG Serum Qualitative Negative Negative   CBC with platelets and differential   Result Value Ref Range    WBC Count 6.5 4.0 - 11.0 10e3/uL    RBC Count 3.94 3.80 - 5.20 10e6/uL    Hemoglobin 12.6 11.7 - 15.7 g/dL    Hematocrit 37.4 35.0 - 47.0 %    MCV 95 78 - 100 fL    MCH 32.0 26.5 - 33.0 pg    MCHC 33.7 31.5 - 36.5 g/dL    RDW 12.5 10.0 - 15.0 %    Platelet Count 316 150 - 450 10e3/uL    % Neutrophils 65 %    % Lymphocytes 23 %    % Monocytes 8 %    % Eosinophils 2 %    % Basophils 1 %    % Immature Granulocytes 1 %    NRBCs per 100 WBC 0 <1 /100    Absolute Neutrophils 4.2 1.6 - 8.3 10e3/uL    Absolute Lymphocytes 1.5 0.8 - 5.3 10e3/uL     Absolute Monocytes 0.5 0.0 - 1.3 10e3/uL    Absolute Eosinophils 0.1 0.0 - 0.7 10e3/uL    Absolute Basophils 0.0 0.0 - 0.2 10e3/uL    Absolute Immature Granulocytes 0.1 <=0.4 10e3/uL    Absolute NRBCs 0.0 10e3/uL   UA with Microscopic reflex to Culture    Specimen: Urine, Clean Catch   Result Value Ref Range    Color Urine Light Yellow Colorless, Straw, Light Yellow, Yellow    Appearance Urine Clear Clear    Glucose Urine Negative Negative mg/dL    Bilirubin Urine Negative Negative    Ketones Urine Negative Negative mg/dL    Specific Gravity Urine 1.012 1.003 - 1.035    Blood Urine Negative Negative    pH Urine 7.0 5.0 - 7.0    Protein Albumin Urine Negative Negative mg/dL    Urobilinogen Urine Normal Normal, 2.0 mg/dL    Nitrite Urine Negative Negative    Leukocyte Esterase Urine Negative Negative    Bacteria Urine Few (A) None Seen /HPF    Mucus Urine Present (A) None Seen /LPF    RBC Urine 3 (H) <=2 /HPF    WBC Urine <1 <=5 /HPF    Squamous Epithelials Urine <1 <=1 /HPF    Narrative    Urine Culture not indicated   US Pelvic Complete w Transvaginal & Abd/Pel Duplex Limited    Narrative    EXAM: US PELVIS COMPLETE W TRANSVAGINAL AND DOPPLER LIMITED  LOCATION: [facility]  DATE: 9/1/2024    INDICATION: LLQ abd pain  COMPARISON: 8/29/2024  TECHNIQUE: Transabdominal scans were performed. Endovaginal ultrasound was performed to better visualize the adnexa. Color flow with spectral Doppler and waveform analysis performed.    FINDINGS:    UTERUS: 8.9 x 4.0 x 3.3 cm. Normal in size and position with no masses. Myometrial heterogeneity with indistinct endometrial-myometrial interface.    ENDOMETRIUM: 5 mm. Normal smooth endometrium. IUD in good position.    RIGHT OVARY: 3.1 x 1.9 x 1.9 cm. Normal with arterial and venous duplex flow identified.    LEFT OVARY: 5.3 x 4.5 x 3.7 cm. Normal arterial and venous duplex flow identified. Multilocular cyst measuring 4.0 x 3.8 x 2.9 cm.    No significant free  fluid.      Impression    IMPRESSION:    1.  No acute findings.  2.  Myometrial heterogeneity with indistinct endometrial-myometrial interface suggesting adenomyosis. MRI could be considered for confirmation, as indicated.  3.  Multilocular left ovarian cyst measuring up to 4.0 cm. Follow-up recommendations below:    PROBABLY BENIGN CYSTS:  (single thin (<3mm) septum, calcification in cyst wall, not classic to be described as hemorrhagic, endometrioma, or dermoid cyst).  Premenopausal/early post-menopausal (< 5 years from LMP):   <=7 cm: generally benign. Follow up in 6-12 weeks,     REFERENCE:  Management of Asymptomatic Ovarian and Other Adnexal Cysts Imaged at US: Society of Radiologists in Ultrasound Consensus Conference Statement. Radiology 2010; [id number].    Simple Adnexal Cysts: SRU Consensus Conference Update on Follow-up and Reporting. Radiology 2019. [id number].     Assessment & Plan   Yvonne Diez is a 43 year old female  (CD X 2) with significant hisotry of severe endometriosis and extensive surgical hx now with acute episode of LLQ pain since 24 which worsened today for which she was evaluated at the ED.     LLQ pain, Left ovarian cyst  - hemorrhagic VS endometrioma  - does not appear to be rupture - no free fluid on US, however this risk is present and this was discussed with pt  - does not appear to be torsed- + venous/arterial flow on US, however this risk is present and may also be intermittent, discussed with pt  - discussed proceeding with surgery for acute intervention due to her pain and potential risks above discussed which los suspicion for torsion or rupture but still risks that are present to happen. I also discussed that in the event of proceeding with surgery it would only be to treat left ovarian cyst and no other procedures would be done (hysterectomy that she plans needs to be done with primary) this ultimately represents more surgical procedures  in the near future. Pt verbalized understanding. Given the complexity of her surgical hx and that she actually responded well to dilaudid bringing down her pain to 4 from 10, I think it is reasonable to try and manage her pain overnight, if pain remains well controlled and ultimately pain responds well to PO narcotics and non narcotic options then she could go home tomorrow and have surgical procedure done sooner than later with her primary surgeon. Pt wishes the latter instead of proceeding with surgery at this very moment. She does understand that if her pain does not respond to pain regimen or goes out of control then surgery will be indicated. Pt verbalized understanding and would be then amenable to.     - will keep NPO until tomorrow's evaluation. Ice chips and zips or water ok  - IV fluids 100 ml/hr for maintenance  - IV dilaudid for breaking through  - PO oxy 10 q 4 hrs for breaking through  - ibuprofen 800 mg q 6 hrs around the clock  - tylenol 650 mg q 6 hrs around the clock  - hydroxyzine 25-50 mg q 4-6 hrs PRN      Dr. Cain   [phone]

## 2024-09-02 NOTE — PROGRESS NOTES
[facility] Obstetrics Post-Op / Progress Note    Interval History   Doing well.  Pain is moderately well-controlled.  No fevers.  No history of wound drainage, warmth or significant erythema.  Good appetite.  Denies chest pain, shortness of breath, nausea or vomiting.  Ambulatory.      Medications   Current Facility-Administered Medications   Medication Dose Route Frequency Provider Last Rate Last Admin    lactated ringers infusion   Intravenous Continuous Evette Patel  mL/hr at 09/01/24 2257 New Bag at 09/01/24 2257     Current Facility-Administered Medications   Medication Dose Route Frequency Provider Last Rate Last Admin    acetaminophen (TYLENOL) tablet 650 mg  650 mg Oral Q6H Evette Patel MD   650 mg at 09/02/24 0610    Or    acetaminophen (TYLENOL) Suppository 650 mg  650 mg Rectal Q6H Evette Patel MD        buPROPion (WELLBUTRIN SR) 12 hr tablet 200 mg  200 mg Oral Daily Evette Patel MD   200 mg at 09/02/24 0949    docusate sodium (COLACE) capsule 100 mg  100 mg Oral BID Evette Patel MD   100 mg at 09/02/24 0943    DULoxetine (CYMBALTA) DR capsule 30 mg  30 mg Oral At Bedtime Evette Patel MD   30 mg at 09/02/24 0057    DULoxetine (CYMBALTA) DR capsule 60 mg  60 mg Oral At Bedtime Evette Patel MD   60 mg at 09/02/24 0056    ibuprofen (ADVIL/MOTRIN) tablet 600 mg  600 mg Oral Q6H Evette Patel MD   600 mg at 09/02/24 0943    pantoprazole (PROTONIX) EC tablet 40 mg  40 mg Oral BID Evette Patel MD   40 mg at 09/02/24 0944    polyethylene glycol (MIRALAX) Packet 17 g  17 g Oral Daily Evette Patel MD   17 g at 09/02/24 0944    traZODone (DESYREL) tablet 100 mg  100 mg Oral At Bedtime Evette Patel MD   100 mg at 09/02/24 0057       Physical Exam   Temp: 98  F (36.7  C) Temp src: Oral BP: 114/82 Pulse: 69   Resp: 20 SpO2: 100 % O2 Device: None (Room air)    Vitals:    09/01/24 1323 09/01/24 2126   Weight: 97.9 kg (215 lb 13.3 oz) 95.9 kg (211 lb 8 oz)     Vital Signs  with Ranges  Temp:  [97.5  F (36.4  C)-98  F (36.7  C)] 98  F (36.7  C)  Pulse:  [57-94] 69  Resp:  [18-20] 20  BP: ()/(61-92) 114/82  SpO2:  [93 %-100 %] 100 %  I/O last 3 completed shifts:  In: 414 [I.V.:414]  Out: -     Extremities Non-tender  Heart is regular rate and rhythm and lungs clear to auscultation    Data   No lab results found.  Recent Labs   Lab Test 09/01/24  1410 08/29/24  1012   HGB 12.6 12.3     No lab results found.    Assessment & Plan   -* No surgery found *     Doing well.  Pain well-controlled.  Discharge later today    Justa Vieyra MD

## 2024-09-02 NOTE — PLAN OF CARE
"Goal Outcome Evaluation:      Plan of Care Reviewed With: patient    Overall Patient Progress: no change      Orientation: Alert and oriented x4  VSS. 96% on RA. afebrile.   LS: clear and equal bilaterally  GI: Passing gas. no BM. Intermittent nausea. Atarax x1 with relief in symptoms  : Adequate urine output.   Skin: intact  Activity: Independent. Pt resting comfortably between cares. Difficulty falling asleep.   Pain: 7/10 left abdominal pain. Dilaudid x2. Atarax x1. Oxycodone x2. Gabapentin x1. Klonopin x1. Scheduled tylenol and ibuprofen.   Updates/Plan: Continue with current cares.           Problem: Adult Inpatient Plan of Care  Goal: Plan of Care Review  Description: The Plan of Care Review/Shift note should be completed every shift.  The Outcome Evaluation is a brief statement about your assessment that the patient is improving, declining, or no change.  This information will be displayed automatically on your shift  note.  Outcome: Progressing  Flowsheets (Taken 9/2/2024 0504)  Plan of Care Reviewed With: patient  Overall Patient Progress: no change  Goal: Patient-Specific Goal (Individualized)  Description: You can add care plan individualizations to a care plan. Examples of Individualization might be:  \"Parent requests to be called daily at 9am for status\", \"I have a hard time hearing out of my right ear\", or \"Do not touch me to wake me up as it startles  me\".  Outcome: Progressing  Goal: Absence of Hospital-Acquired Illness or Injury  Outcome: Progressing  Intervention: Identify and Manage Fall Risk  Recent Flowsheet Documentation  Taken 9/2/2024 0010 by Dee Yu RN  Safety Promotion/Fall Prevention:   clutter free environment maintained   mobility aid in reach   nonskid shoes/slippers when out of bed   patient and family education   room near nurse's station   room organization consistent   safety round/check completed   treat reversible contributory factors   treat underlying " cause  Intervention: Prevent Skin Injury  Recent Flowsheet Documentation  Taken 9/2/2024 0010 by Dee Yu RN  Body Position: position changed independently  Skin Protection: adhesive use limited  Device Skin Pressure Protection:   adhesive use limited   tubing/devices free from skin contact  Intervention: Prevent and Manage VTE (Venous Thromboembolism) Risk  Recent Flowsheet Documentation  Taken 9/2/2024 0010 by Dee Yu RN  VTE Prevention/Management: SCDs off (sequential compression devices)  Intervention: Prevent Infection  Recent Flowsheet Documentation  Taken 9/2/2024 0010 by Dee Yu RN  Infection Prevention:   equipment surfaces disinfected   hand hygiene promoted   rest/sleep promoted   single patient room provided  Goal: Optimal Comfort and Wellbeing  Outcome: Progressing  Intervention: Monitor Pain and Promote Comfort  Recent Flowsheet Documentation  Taken 9/2/2024 0300 by Dee Yu RN  Pain Management Interventions: medication (see MAR)  Taken 9/2/2024 0056 by Dee Yu RN  Pain Management Interventions:   medication (see MAR)   heat applied  Taken 9/2/2024 0010 by Dee Yu RN  Pain Management Interventions: heat applied  Goal: Readiness for Transition of Care  Outcome: Progressing  Intervention: Mutually Develop Transition Plan  Recent Flowsheet Documentation  Taken 9/1/2024 2322 by Dee Yu RN  Equipment Currently Used at Home: none     Problem: Pain Acute  Goal: Optimal Pain Control and Function  Outcome: Progressing  Intervention: Develop Pain Management Plan  Recent Flowsheet Documentation  Taken 9/2/2024 0300 by Dee Yu RN  Pain Management Interventions: medication (see MAR)  Taken 9/2/2024 0056 by Dee Yu RN  Pain Management Interventions:   medication (see MAR)   heat applied  Taken 9/2/2024 0010 by Dee Yu RN  Pain Management Interventions: heat  applied  Intervention: Prevent or Manage Pain  Recent Flowsheet Documentation  Taken 9/2/2024 0010 by Dee Yu, RN  Medication Review/Management:   medications reviewed   high-risk medications identified

## 2024-09-04 ENCOUNTER — PATIENT OUTREACH (OUTPATIENT)
Dept: CARE COORDINATION | Facility: CLINIC | Age: 44
End: 2024-09-04
Payer: COMMERCIAL

## 2024-09-04 NOTE — PROGRESS NOTES
Connected Care Resource Center:   [facility] Care Resource Center Contact  [facility]/Voicemail     Clinical Data: Post-Discharge Outreach     Outreach attempted x 2.  Left message on patient's voicemail, providing [facility]'s central phone number of [phone] ([phone]) for questions/concerns and/or to schedule an appt with an [facility] provider, if they do not have a PCP.      Plan:  [facility] will do no further outreaches at this time.       JOSEE Brown  Connected Care Resource Center, [facility]    *Connected Care Resource Team does NOT follow patient ongoing. Referrals are identified based on internal discharge reports and the outreach is to ensure patient has an understanding of their discharge instructions.

## 2024-09-12 ENCOUNTER — TRANSFERRED RECORDS (OUTPATIENT)
Dept: HEALTH INFORMATION MANAGEMENT | Facility: CLINIC | Age: 44
End: 2024-09-12
Payer: COMMERCIAL

## 2025-01-03 ENCOUNTER — TRANSFERRED RECORDS (OUTPATIENT)
Dept: HEALTH INFORMATION MANAGEMENT | Facility: CLINIC | Age: 45
End: 2025-01-03
Payer: COMMERCIAL

## 2025-01-06 DIAGNOSIS — M26.622 ARTHRALGIA OF LEFT TEMPOROMANDIBULAR JOINT: ICD-10-CM

## 2025-01-07 RX ORDER — CYCLOBENZAPRINE HCL 10 MG
10 TABLET ORAL
Qty: 30 TABLET | Refills: 0 | OUTPATIENT
Start: 2025-01-07

## 2025-01-07 NOTE — TELEPHONE ENCOUNTER
Called and spoke to patient. Patient does not need a refill. Patient would like to schedule a visit. Tried to transfer patient to  for scheduling. Patient disconnected prior to transfer.  Dee MAYS RN, BSN  Clinic RN  [facility]

## 2025-01-07 NOTE — TELEPHONE ENCOUNTER
Can we clarify with patient that she is taking this medication regularly/needs refill? Was discontinued 3 months ago from her medication list, and previously prescribed by another provider for TMJ.    Brandee Harper MD  Internal Medicine-Pediatrics

## 2025-01-09 ENCOUNTER — E-VISIT (OUTPATIENT)
Dept: PEDIATRICS | Facility: CLINIC | Age: 45
End: 2025-01-09
Payer: COMMERCIAL

## 2025-01-09 DIAGNOSIS — Z71.84 TRAVEL ADVICE ENCOUNTER: Primary | ICD-10-CM

## 2025-01-16 RX ORDER — SCOPOLAMINE 1 MG/3D
1 PATCH, EXTENDED RELEASE TRANSDERMAL
Qty: 6 PATCH | Refills: 0 | Status: SHIPPED | OUTPATIENT
Start: 2025-01-16

## 2025-01-27 ENCOUNTER — MYC MEDICAL ADVICE (OUTPATIENT)
Dept: PEDIATRICS | Facility: CLINIC | Age: 45
End: 2025-01-27
Payer: COMMERCIAL

## 2025-02-25 ENCOUNTER — TELEPHONE (OUTPATIENT)
Dept: PEDIATRICS | Facility: CLINIC | Age: 45
End: 2025-02-25
Payer: COMMERCIAL

## 2025-02-25 NOTE — TELEPHONE ENCOUNTER
Prior Authorization Retail Medication Request    Medication/Dose: ZEPBOUND 5 MG/0.5ML prefilled pen  Diagnosis and ICD code (if different than what is on RX):    New/renewal/insurance change PA/secondary ins. PA:  Previously Tried and Failed:    Rationale:      Insurance   Primary:   Insurance ID:  [id number]    Secondary (if applicable):  Insurance ID:      Pharmacy Information (if different than what is on RX)  Name:  [facility]  Phone:    Fax:    Clinic Information  Preferred routing pool for dept communication:

## 2025-02-27 NOTE — TELEPHONE ENCOUNTER
Prior Authorization Not Needed per Insurance    Medication: ZEPBOUND 5 MG/0.5ML prefilled pen  Insurance Company: [employer] Non-Specialty PA's - Phone [phone] Fax [phone]  Expected CoPay:      Pharmacy Filling the Rx: [id number] - [facility] [address] AT [place] [address] & [address] [facility]  Pharmacy Notified:  Yes    Medication was refill too soon at the pharmacy.  Patient has an approval via EPA

## 2025-02-27 NOTE — TELEPHONE ENCOUNTER
Central Prior Authorization Team   Phone: [phone]    PA Initiation    Medication: ZEPBOUND 5 MG/0.5ML prefilled pen  Insurance Company: [employer] Non-Specialty PA's - Phone [phone] Fax [phone]  Pharmacy Filling the Rx: [id number] - [place], [place] - [address] AT [address] [address] & [address]  Filling Pharmacy Phone: [phone]  Filling Pharmacy Fax:    Start Date: 2/27/2025    Former[facility] KEY: [id number]

## 2025-02-28 ENCOUNTER — MYC MEDICAL ADVICE (OUTPATIENT)
Dept: PEDIATRICS | Facility: CLINIC | Age: 45
End: 2025-02-28
Payer: COMMERCIAL

## 2025-02-28 DIAGNOSIS — E66.811 CLASS 1 OBESITY DUE TO EXCESS CALORIES WITHOUT SERIOUS COMORBIDITY WITH BODY MASS INDEX (BMI) OF 31.0 TO 31.9 IN ADULT: Primary | ICD-10-CM

## 2025-02-28 DIAGNOSIS — E66.09 CLASS 1 OBESITY DUE TO EXCESS CALORIES WITHOUT SERIOUS COMORBIDITY WITH BODY MASS INDEX (BMI) OF 31.0 TO 31.9 IN ADULT: Primary | ICD-10-CM

## 2025-03-03 RX ORDER — TIRZEPATIDE 7.5 MG/.5ML
7.5 INJECTION, SOLUTION SUBCUTANEOUS
Qty: 2 ML | Refills: 2 | Status: SHIPPED | OUTPATIENT
Start: 2025-03-03

## 2025-03-03 NOTE — TELEPHONE ENCOUNTER
"See patient's [employer] message   - Patient states that she gained some weight; reports that she started at 210 pounds, got down to 194 pounds and is now up to 198 pounds   - Patient wondering if she can increase the dose of her ZEPBOUND 5 MG/0.5ML prefilled pen medication     ZEPBOUND 5 MG/0.5ML prefilled pen 2 mL 2 2/21/2025 -- Yes   Sig - Route: Inject 0.5 mLs (5 mg) subcutaneously every 7 days. - Subcutaneous     Upon chart review:   - 2/21/2025 virtual visit with Dr. Cintron states:     \"Class 1 obesity due to excess calories without serious comorbidity with body mass index (BMI) of 31.0 to 31.9 in adult  Zepbound is covered by insurance.  She is up to 5 mg per week.  Pt is losing weight at a rapid pace and having no side effects.  She is exceeding the 2 lbs per week rate that I typically recommend, however she is on a relatively low dose with minimal side effects.  Pt-centered decision to stay on current dose and monitor, as the weight loss rate should taper.  If not, we will consider backing off to lower dose.  Otherwise, she will f/up in 2-3 months.  She has started an exercise routine at the [employer] [facility] in [place].  - ZEPBOUND 5 MG/0.5ML prefilled pen; Inject 0.5 mLs (5 mg) subcutaneously every 7 days.\"     Dr. Cintron, please review and order the increased dose as appropriate.     Alexsandra FERRER RN   [facility]   "

## 2025-03-03 NOTE — TELEPHONE ENCOUNTER
Yes, I have sent in 7.5 with a couple of refills - please have her f/up with me in 2-3 months, but sooner if she is having side effects or wishes to increase further sooner.    Gray Cintron MD

## 2025-05-19 ENCOUNTER — PATIENT OUTREACH (OUTPATIENT)
Dept: CARE COORDINATION | Facility: CLINIC | Age: 45
End: 2025-05-19
Payer: COMMERCIAL

## 2025-05-25 DIAGNOSIS — E66.09 CLASS 1 OBESITY DUE TO EXCESS CALORIES WITHOUT SERIOUS COMORBIDITY WITH BODY MASS INDEX (BMI) OF 31.0 TO 31.9 IN ADULT: ICD-10-CM

## 2025-05-25 DIAGNOSIS — E66.811 CLASS 1 OBESITY DUE TO EXCESS CALORIES WITHOUT SERIOUS COMORBIDITY WITH BODY MASS INDEX (BMI) OF 31.0 TO 31.9 IN ADULT: ICD-10-CM

## 2025-05-27 ENCOUNTER — E-VISIT (OUTPATIENT)
Dept: PEDIATRICS | Facility: CLINIC | Age: 45
End: 2025-05-27
Payer: COMMERCIAL

## 2025-05-27 DIAGNOSIS — R82.998 DARK URINE: Primary | ICD-10-CM

## 2025-05-27 RX ORDER — TIRZEPATIDE 7.5 MG/.5ML
7.5 INJECTION, SOLUTION SUBCUTANEOUS
Qty: 2 ML | Refills: 2 | Status: SHIPPED | OUTPATIENT
Start: 2025-05-27

## 2025-06-09 ENCOUNTER — HOSPITAL ENCOUNTER (OUTPATIENT)
Facility: HOSPITAL | Age: 45
End: 2025-06-09
Attending: STUDENT IN AN ORGANIZED HEALTH CARE EDUCATION/TRAINING PROGRAM | Admitting: STUDENT IN AN ORGANIZED HEALTH CARE EDUCATION/TRAINING PROGRAM
Payer: COMMERCIAL

## 2025-06-09 ENCOUNTER — TRANSFERRED RECORDS (OUTPATIENT)
Dept: HEALTH INFORMATION MANAGEMENT | Facility: CLINIC | Age: 45
End: 2025-06-09
Payer: COMMERCIAL

## 2025-06-24 ENCOUNTER — LAB (OUTPATIENT)
Dept: LAB | Facility: CLINIC | Age: 45
End: 2025-06-24
Payer: COMMERCIAL

## 2025-06-24 DIAGNOSIS — R82.998 DARK URINE: ICD-10-CM

## 2025-06-24 PROCEDURE — 80053 COMPREHEN METABOLIC PANEL: CPT

## 2025-06-24 PROCEDURE — 36415 COLL VENOUS BLD VENIPUNCTURE: CPT

## 2025-06-25 ENCOUNTER — RESULTS FOLLOW-UP (OUTPATIENT)
Dept: PEDIATRICS | Facility: CLINIC | Age: 45
End: 2025-06-25

## 2025-06-25 LAB
ALBUMIN SERPL BCG-MCNC: 4.2 G/DL (ref 3.5–5.2)
ALP SERPL-CCNC: 71 U/L (ref 40–150)
ALT SERPL W P-5'-P-CCNC: 46 U/L (ref 0–50)
ANION GAP SERPL CALCULATED.3IONS-SCNC: 11 MMOL/L (ref 7–15)
AST SERPL W P-5'-P-CCNC: 41 U/L (ref 0–45)
BILIRUB SERPL-MCNC: 0.5 MG/DL
BUN SERPL-MCNC: 7.3 MG/DL (ref 6–20)
CALCIUM SERPL-MCNC: 9.6 MG/DL (ref 8.8–10.4)
CHLORIDE SERPL-SCNC: 102 MMOL/L (ref 98–107)
CREAT SERPL-MCNC: 0.76 MG/DL (ref 0.51–0.95)
EGFRCR SERPLBLD CKD-EPI 2021: >90 ML/MIN/1.73M2
GLUCOSE SERPL-MCNC: 103 MG/DL (ref 70–99)
HCO3 SERPL-SCNC: 24 MMOL/L (ref 22–29)
POTASSIUM SERPL-SCNC: 4.1 MMOL/L (ref 3.4–5.3)
PROT SERPL-MCNC: 7 G/DL (ref 6.4–8.3)
SODIUM SERPL-SCNC: 137 MMOL/L (ref 135–145)

## 2025-07-14 ENCOUNTER — TRANSFERRED RECORDS (OUTPATIENT)
Dept: HEALTH INFORMATION MANAGEMENT | Facility: CLINIC | Age: 45
End: 2025-07-14
Payer: COMMERCIAL

## 2025-07-28 ENCOUNTER — RESULTS FOLLOW-UP (OUTPATIENT)
Dept: PEDIATRICS | Facility: CLINIC | Age: 45
End: 2025-07-28

## 2025-07-28 ENCOUNTER — OFFICE VISIT (OUTPATIENT)
Dept: PEDIATRICS | Facility: CLINIC | Age: 45
End: 2025-07-28
Payer: COMMERCIAL

## 2025-07-28 VITALS
DIASTOLIC BLOOD PRESSURE: 58 MMHG | RESPIRATION RATE: 16 BRPM | SYSTOLIC BLOOD PRESSURE: 102 MMHG | OXYGEN SATURATION: 99 % | HEIGHT: 67 IN | BODY MASS INDEX: 24.89 KG/M2 | HEART RATE: 84 BPM | TEMPERATURE: 98.1 F | WEIGHT: 158.6 LBS

## 2025-07-28 DIAGNOSIS — E66.811 CLASS 1 OBESITY DUE TO EXCESS CALORIES WITHOUT SERIOUS COMORBIDITY WITH BODY MASS INDEX (BMI) OF 31.0 TO 31.9 IN ADULT: ICD-10-CM

## 2025-07-28 DIAGNOSIS — N80.9 ENDOMETRIOSIS: ICD-10-CM

## 2025-07-28 DIAGNOSIS — Z01.818 PREOP GENERAL PHYSICAL EXAM: Primary | ICD-10-CM

## 2025-07-28 DIAGNOSIS — E66.09 CLASS 1 OBESITY DUE TO EXCESS CALORIES WITHOUT SERIOUS COMORBIDITY WITH BODY MASS INDEX (BMI) OF 31.0 TO 31.9 IN ADULT: ICD-10-CM

## 2025-07-28 DIAGNOSIS — I80.02 THROMBOPHLEBITIS OF SUPERFICIAL VEINS OF LEFT LOWER EXTREMITY: ICD-10-CM

## 2025-07-28 LAB
BASOPHILS # BLD AUTO: 0 10E3/UL (ref 0–0.2)
BASOPHILS NFR BLD AUTO: 1 %
EOSINOPHIL # BLD AUTO: 0.1 10E3/UL (ref 0–0.7)
EOSINOPHIL NFR BLD AUTO: 3 %
ERYTHROCYTE [DISTWIDTH] IN BLOOD BY AUTOMATED COUNT: 13.1 % (ref 10–15)
HCT VFR BLD AUTO: 44 % (ref 35–47)
HGB BLD-MCNC: 14.4 G/DL (ref 11.7–15.7)
IMM GRANULOCYTES # BLD: 0 10E3/UL
IMM GRANULOCYTES NFR BLD: 0 %
LYMPHOCYTES # BLD AUTO: 1.5 10E3/UL (ref 0.8–5.3)
LYMPHOCYTES NFR BLD AUTO: 31 %
MCH RBC QN AUTO: 31.8 PG (ref 26.5–33)
MCHC RBC AUTO-ENTMCNC: 32.7 G/DL (ref 31.5–36.5)
MCV RBC AUTO: 97 FL (ref 78–100)
MONOCYTES # BLD AUTO: 0.4 10E3/UL (ref 0–1.3)
MONOCYTES NFR BLD AUTO: 7 %
NEUTROPHILS # BLD AUTO: 2.9 10E3/UL (ref 1.6–8.3)
NEUTROPHILS NFR BLD AUTO: 58 %
PLATELET # BLD AUTO: 366 10E3/UL (ref 150–450)
RBC # BLD AUTO: 4.53 10E6/UL (ref 3.8–5.2)
WBC # BLD AUTO: 4.9 10E3/UL (ref 4–11)

## 2025-07-28 PROCEDURE — 85025 COMPLETE CBC W/AUTO DIFF WBC: CPT | Performed by: PHYSICIAN ASSISTANT

## 2025-07-28 PROCEDURE — 3074F SYST BP LT 130 MM HG: CPT | Performed by: PHYSICIAN ASSISTANT

## 2025-07-28 PROCEDURE — 99214 OFFICE O/P EST MOD 30 MIN: CPT | Performed by: PHYSICIAN ASSISTANT

## 2025-07-28 PROCEDURE — 3078F DIAST BP <80 MM HG: CPT | Performed by: PHYSICIAN ASSISTANT

## 2025-07-28 PROCEDURE — 36415 COLL VENOUS BLD VENIPUNCTURE: CPT | Performed by: PHYSICIAN ASSISTANT

## 2025-07-28 PROCEDURE — 1126F AMNT PAIN NOTED NONE PRSNT: CPT | Performed by: PHYSICIAN ASSISTANT

## 2025-07-28 RX ORDER — DULOXETIN HYDROCHLORIDE 60 MG/1
120 CAPSULE, DELAYED RELEASE ORAL AT BEDTIME
COMMUNITY
Start: 2025-07-28

## 2025-07-28 RX ORDER — PANTOPRAZOLE SODIUM 40 MG/1
40 TABLET, DELAYED RELEASE ORAL DAILY
COMMUNITY
Start: 2025-07-28

## 2025-07-28 ASSESSMENT — PAIN SCALES - GENERAL: PAINLEVEL_OUTOF10: NO PAIN (0)

## 2025-07-28 NOTE — PATIENT INSTRUCTIONS
Hold supplements/vitamins starting tomorrow  Hold ibuprofen/NSAIDs one week prior  Hold zepbound Aug 10 dose

## 2025-07-28 NOTE — PROGRESS NOTES
Preoperative Evaluation  [facility] [place]  [address]  [address]  [place] [address]  Phone: [phone]  Fax: [phone]  Primary Provider: Brandee Estrada MD  Pre-op Performing Provider: Guilherme Landry PA-C  Jul 28, 2025 7/27/2025   Surgical Information   What procedure is being done? Robotic hysterectomy with possiple peritoneal stripping and endometriosis removal   Facility or Hospital where procedure/surgery will be performed: [facility]   Who is doing the procedure / surgery? Dr. Merino   Date of surgery / procedure: 8/12/2025   Time of surgery / procedure: 0740   Where do you plan to recover after surgery? at home with family     Fax number for surgical facility: [facility] - patient will send message with fax number - [facility] fax [phone]    Assessment & Plan     The proposed surgical procedure is considered INTERMEDIATE risk.    Preop general physical exam  - CBC with platelets and differential; Future  - CBC with platelets and differential    Endometriosis    Thrombophlebitis of superficial veins of left lower extremity    Class 1 obesity due to excess calories without serious comorbidity with body mass index (BMI) of 31.0 to 31.9 in adult  - tirzepatide-Weight Management (ZEPBOUND) 5 MG/0.5ML prefilled pen; Inject 0.5 mLs (5 mg) subcutaneously every 7 days.       - No identified additional risk factors other than previously addressed    Patient Instructions   Hold supplements/vitamins starting tomorrow  Hold ibuprofen/NSAIDs one week prior  Hold zepbound Aug 10 dose     Recommendation  Approval given to proceed with proposed procedure, without further diagnostic evaluation.    Narcisa Nickerson is a 44 year old, presenting for the following:  Pre-Op Exam  HPI: history of endometriosis with pain    Patient would like to decreased zepbound from 7.5 to 5 mg as she has worsening anxiety at higher dose.       7/27/2025   Pre-Op Questionnaire   Have you ever had  a heart attack or stroke? No   Have you ever had surgery on your heart or blood vessels, such as a stent placement, a coronary artery bypass, or surgery on an artery in your head, neck, heart, or legs? No   Do you have chest pain with activity? No   Do you have a history of heart failure? No   Do you currently have a cold, bronchitis or symptoms of other infection? No   Do you have a cough, shortness of breath, or wheezing? No   Do you or anyone in your family have previous history of blood clots? (!) YES --superficial thrombophlebitis with pregnancy   Do you or does anyone in your family have a serious bleeding problem such as prolonged bleeding following surgeries or cuts? No   Have you ever had problems with anemia or been told to take iron pills? No   Have you had any abnormal blood loss such as black, tarry or bloody stools, or abnormal vaginal bleeding? No   Have you ever had a blood transfusion? No   Are you willing to have a blood transfusion if it is medically needed before, during, or after your surgery? Yes   Have you or any of your relatives ever had problems with anesthesia? No   Do you have sleep apnea, excessive snoring or daytime drowsiness? No   Do you have any artifical heart valves or other implanted medical devices like a pacemaker, defibrillator, or continuous glucose monitor? No   Do you have artificial joints? No   Are you allergic to latex? 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  2008 Abnormal pap with HPV+  2008 [place]: Unknown  result   03/22/2012 NIL  02/05/2016 NIL/HPV+  02/08/2017 NIL/HPV+  02/27/2017 [place]: Benign biopsy   08/09/2018 NIL/HPV+  11/05/2018 [place]: No Biopsy   05/29/2019 NIL/HPV+  05/29/2019 [place]: ECC BERKLEY 2  06/20/2019 [facility]: BERKLEY II, Positive BERKLEY II Margin.   10/08/2019 NIL/HPV negative   06/02/2021 NIL/HPV negative     ASCCP recommends:  History of CIN2 - CIN3:  Pap and HPV every 3 years for 25 years.     Plan: Pap/HPV due 06/2024  Formatting of this note might be different from the original.  Formatting of this note might be different from the original.  2008 Abnormal pap with HPV+  2008 [place]: Unknown result   03/22/2012 NIL  02/05/2016 NIL/HPV+  02/08/2017 NIL/HPV+  02/27/2017 [place]: Benign biopsy   08/09/2018 NIL/HPV+  11/05/2018 [place]: No Biopsy   05/29/2019 NIL/HPV+  05/29/2019 [place]: ECC BERKLEY 2  06/20/2019 [facility]: BERKLEY II, Positive BERKLEY II Margin.   10/08/2019 NIL/HPV negative   06/02/2021 NIL/HPV negative     ASCCP recommends:  History of CIN2 - CIN3:  Pap and HPV every 3 years for 25 years.     Plan: Pap/HPV due 06/2024      Thrombophlebitis of superficial veins of left lower extremity 04/24/2019     Priority: Medium     Formatting of this note might be different from the original.  At 1.5 weeks postpartum, in the left medial knee, tx'd by emergency department with NSAIDs and warm compresses      Oligomenorrhea 02/12/2018     Priority: Medium    MDD (major depressive disorder), recurrent episode, moderate (H) 04/09/2015     Priority: Medium    BONNIE (generalized anxiety disorder) 04/09/2015     Priority: Medium    Heart palpitations 10/29/2014     Priority: Medium    Insomnia 06/26/2012     Priority: Medium    Recurrent major depressive disorder, in partial remission 06/26/2012     Priority: Medium    Generalized anxiety disorder 03/12/2012     Priority: Medium     Diagnosis updated by automated process. Provider to review and confirm.      Endometriosis 01/04/2007     Priority: Medium     Formatting of this note might be  different from the original.  Diagnosed by laparascopy  Formatting of this note might be different from the original.  Formatting of this note might be different from the original.  Diagnosed by laparascopy        Past Medical History:   Diagnosis Date    Anxiety     Depressive disorder     Endometriosis      Past Surgical History:   Procedure Laterality Date    BIOPSY      skin    BLOOD PATCH N/A 2017    Procedure: EPIDURAL BLOOD PATCH;  EPIDURAL BLOOD PATCH;  Surgeon: GENERIC ANESTHESIA PROVIDER;  Location: [facility] OR     SECTION      2019    COLONOSCOPY      [facility] LAPAROSCOPY, SURGICAL, ABDOMEN, PERITONEUM & OMENTUM; DX W/ OR W/O SPECIMEN(S)  2006    Laparoscopy, diagnostic    [facility] GENERAL SURG (ABSTRACTED)  2001    wisdom  teeth     Current Outpatient Medications   Medication Sig Dispense Refill    acetaminophen (TYLENOL) 500 MG tablet Take 1,000 mg by mouth every 6 hours as needed for mild pain.      buPROPion (WELLBUTRIN SR) 200 MG 12 hr tablet Take 200 mg by mouth daily.      CALCIUM-VITAMIN D PO Take 1 tablet by mouth daily.      DULoxetine (CYMBALTA) 60 MG capsule Take 2 capsules (120 mg) by mouth at bedtime.      gabapentin (NEURONTIN) 300 MG capsule Take 300 mg by mouth 3 times daily as needed.      ibuprofen (ADVIL/MOTRIN) 200 MG capsule Take 400-600 mg by mouth every 4 hours as needed. 2 tablets as needed for pain      loratadine (CLARITIN) 10 MG tablet Take 10 mg by mouth daily      Multiple Vitamins-Minerals (MULTI MAX PO) Take 1 tablet by mouth daily.      oxyCODONE IR (ROXICODONE) 10 MG tablet Take 1 tablet (10 mg) by mouth every 6 hours as needed for severe pain (IF pain not managed with non-pharmacological and non-opioid interventions). 20 tablet 0    pantoprazole (PROTONIX) 40 MG EC tablet Take 1 tablet (40 mg) by mouth daily.      polyethylene glycol (MIRALAX) 17 GM/Dose powder Take 1 Capful by mouth daily.      tirzepatide-Weight Management (ZEPBOUND) 5 MG/0.5ML prefilled  "pen Inject 0.5 mLs (5 mg) subcutaneously every 7 days. 2 mL 1    traZODone (DESYREL) 100 MG tablet Take 100 mg by mouth at bedtime.         Allergies   Allergen Reactions    Sulfa Antibiotics Hives     Mother said this happened as a child        Social History     Tobacco Use    Smoking status: Former     Current packs/day: 0.00     Types: Cigarettes     Start date: 2006     Quit date: 2011     Years since quittin.6     Passive exposure: Never    Smokeless tobacco: Never    Tobacco comments:     Smoked 1 pack/week   Substance Use Topics    Alcohol use: Yes     Types: 8 Standard drinks or equivalent per week     Comment: 3 x week     History   Drug Use No       Review of Systems  Constitutional, neuro, ENT, endocrine, pulmonary, cardiac, gastrointestinal, genitourinary, musculoskeletal, integument and psychiatric systems are negative, except as otherwise noted.    Objective    /58 (BP Location: Right arm, Cuff Size: Adult Regular)   Pulse 84   Temp 98.1  F (36.7  C) (Tympanic)   Resp 16   Ht 1.708 m (5' 7.25\")   Wt 71.9 kg (158 lb 9.6 oz)   LMP  (LMP Unknown)   SpO2 99%   BMI 24.66 kg/m     Estimated body mass index is 24.66 kg/m  as calculated from the following:    Height as of this encounter: 1.708 m (5' 7.25\").    Weight as of this encounter: 71.9 kg (158 lb 9.6 oz).  Physical Exam  GENERAL: alert and no distress  EYES: Eyes grossly normal to inspection, PERRL and conjunctivae and sclerae normal  HENT: ear canals and TM's normal, nose and mouth without ulcers or lesions  NECK: no adenopathy, no asymmetry, masses, or scars  RESP: lungs clear to auscultation - no rales, rhonchi or wheezes  CV: regular rate and rhythm, normal S1 S2, no S3 or S4, no murmur, click or rub, no peripheral edema  ABDOMEN: soft, nontender, no hepatosplenomegaly, no masses and bowel sounds normal  MS: no gross musculoskeletal defects noted, no edema    Diagnostics:   Results for orders placed or performed in " visit on 07/28/25   CBC with platelets and differential     Status: None   Result Value Ref Range    WBC Count 4.9 4.0 - 11.0 10e3/uL    RBC Count 4.53 3.80 - 5.20 10e6/uL    Hemoglobin 14.4 11.7 - 15.7 g/dL    Hematocrit 44.0 35.0 - 47.0 %    MCV 97 78 - 100 fL    MCH 31.8 26.5 - 33.0 pg    MCHC 32.7 31.5 - 36.5 g/dL    RDW 13.1 10.0 - 15.0 %    Platelet Count 366 150 - 450 10e3/uL    % Neutrophils 58 %    % Lymphocytes 31 %    % Monocytes 7 %    % Eosinophils 3 %    % Basophils 1 %    % Immature Granulocytes 0 %    Absolute Neutrophils 2.9 1.6 - 8.3 10e3/uL    Absolute Lymphocytes 1.5 0.8 - 5.3 10e3/uL    Absolute Monocytes 0.4 0.0 - 1.3 10e3/uL    Absolute Eosinophils 0.1 0.0 - 0.7 10e3/uL    Absolute Basophils 0.0 0.0 - 0.2 10e3/uL    Absolute Immature Granulocytes 0.0 <=0.4 10e3/uL   CBC with platelets and differential     Status: None    Narrative    The following orders were created for panel order CBC with platelets and differential.  Procedure                               Abnormality         Status                     ---------                               -----------         ------                     CBC with platelets and ...[[id number]]                      Final result                 Please view results for these tests on the individual orders.     No EKG required, no history of coronary heart disease, significant arrhythmia, peripheral arterial disease or other structural heart disease.    Revised Cardiac Risk Index (RCRI)  The patient has the following serious cardiovascular risks for perioperative complications:   - No serious cardiac risks = 0 points     RCRI Interpretation: 0 points: Class I (very low risk - 0.4% complication rate)         Signed Electronically by: Guilherme Landry PA-C  A copy of this evaluation report is provided to the requesting physician.  Answers submitted by the patient for this visit:  Patient Health Questionnaire (Submitted on 7/27/2025)  If you checked off  any problems, how difficult have these problems made it for you to do your work, take care of things at home, or get along with other people?: Not difficult at all  PHQ9 TOTAL SCORE: 4

## 2025-08-03 ENCOUNTER — HEALTH MAINTENANCE LETTER (OUTPATIENT)
Age: 45
End: 2025-08-03

## 2025-08-19 ENCOUNTER — TRANSFERRED RECORDS (OUTPATIENT)
Dept: HEALTH INFORMATION MANAGEMENT | Facility: CLINIC | Age: 45
End: 2025-08-19
Payer: COMMERCIAL

## 2025-08-28 ENCOUNTER — TRANSFERRED RECORDS (OUTPATIENT)
Dept: HEALTH INFORMATION MANAGEMENT | Facility: CLINIC | Age: 45
End: 2025-08-28
Payer: COMMERCIAL